# Patient Record
Sex: MALE | Race: WHITE | NOT HISPANIC OR LATINO | URBAN - NONMETROPOLITAN AREA
[De-identification: names, ages, dates, MRNs, and addresses within clinical notes are randomized per-mention and may not be internally consistent; named-entity substitution may affect disease eponyms.]

---

## 2022-07-13 RX ORDER — GABAPENTIN 300 MG/1
300 CAPSULE ORAL 2 TIMES DAILY
COMMUNITY
End: 2022-07-13 | Stop reason: SDUPTHER

## 2022-07-13 NOTE — TELEPHONE ENCOUNTER
Rx Refill Note  Requested Prescriptions     Pending Prescriptions Disp Refills   • metFORMIN (GLUCOPHAGE) 500 MG tablet       Sig: Take 1 tablet by mouth 3 (Three) Times a Day With Meals.   • gabapentin (NEURONTIN) 300 MG capsule       Sig: Take 1 capsule by mouth 2 (Two) Times a Day.      Last office visit with prescribing clinician: Visit date not found      Next office visit with prescribing clinician: 7/21/2022            Noemi Yañez Rep  07/13/22, 16:30 EDT

## 2022-07-14 NOTE — TELEPHONE ENCOUNTER
Caller: Gustavo Singh    Relationship to patient: Self    Best call back number: [phone]    Patient is needing: PATIENT CALLED BACK TO CHECK ON MEDICATION REFILLS.   PLEASE ADVISE

## 2022-07-14 NOTE — TELEPHONE ENCOUNTER
Rx Refill Note    PROTOCOLS NOT MET     Requested Prescriptions     Pending Prescriptions Disp Refills   • metFORMIN (GLUCOPHAGE) 500 MG tablet 270 tablet 0     Sig: Take 1 tablet by mouth 3 (Three) Times a Day With Meals.   • gabapentin (NEURONTIN) 300 MG capsule 180 capsule 0     Sig: Take 1 capsule by mouth 2 (Two) Times a Day.      Last office visit with prescribing clinician: Visit date not found      Next office visit with prescribing clinician: 7/21/2022            Leah Encinas LPN  07/14/22, 12:08 EDT

## 2022-07-15 RX ORDER — GABAPENTIN 300 MG/1
300 CAPSULE ORAL 2 TIMES DAILY
Qty: 180 CAPSULE | Refills: 0 | Status: SHIPPED | OUTPATIENT
Start: 2022-07-15 | End: 2022-10-18

## 2022-07-21 ENCOUNTER — OFFICE VISIT (OUTPATIENT)
Dept: FAMILY MEDICINE CLINIC | Facility: CLINIC | Age: 57
End: 2022-07-21

## 2022-07-21 VITALS
OXYGEN SATURATION: 96 % | HEIGHT: 73 IN | TEMPERATURE: 98.9 F | SYSTOLIC BLOOD PRESSURE: 136 MMHG | RESPIRATION RATE: 16 BRPM | BODY MASS INDEX: 27.67 KG/M2 | DIASTOLIC BLOOD PRESSURE: 82 MMHG | HEART RATE: 66 BPM | WEIGHT: 208.8 LBS

## 2022-07-21 DIAGNOSIS — E78.2 MIXED HYPERLIPIDEMIA: ICD-10-CM

## 2022-07-21 DIAGNOSIS — E11.9 TYPE 2 DIABETES MELLITUS WITHOUT COMPLICATION, WITHOUT LONG-TERM CURRENT USE OF INSULIN: ICD-10-CM

## 2022-07-21 DIAGNOSIS — I10 BENIGN HYPERTENSION: Primary | ICD-10-CM

## 2022-07-21 PROBLEM — G47.33 OSA (OBSTRUCTIVE SLEEP APNEA): Status: ACTIVE | Noted: 2022-07-21

## 2022-07-21 PROBLEM — Z97.2 WEARS DENTURES: Status: ACTIVE | Noted: 2022-07-21

## 2022-07-21 PROBLEM — Z97.3 WEARS GLASSES: Status: ACTIVE | Noted: 2022-07-21

## 2022-07-21 PROBLEM — T78.40XA ALLERGIES: Status: ACTIVE | Noted: 2022-07-21

## 2022-07-21 PROBLEM — Z72.0 TOBACCO CHEW USE: Status: ACTIVE | Noted: 2022-07-21

## 2022-07-21 PROBLEM — I25.10 CHD (CORONARY HEART DISEASE): Status: ACTIVE | Noted: 2018-01-01

## 2022-07-21 PROCEDURE — 99214 OFFICE O/P EST MOD 30 MIN: CPT | Performed by: FAMILY MEDICINE

## 2022-07-21 RX ORDER — BETAMETHASONE DIPROPIONATE 0.05 %
1 OINTMENT (GRAM) TOPICAL 2 TIMES DAILY
COMMUNITY

## 2022-07-21 RX ORDER — HYDROCHLOROTHIAZIDE 25 MG/1
25 TABLET ORAL DAILY
COMMUNITY
End: 2022-08-02

## 2022-07-21 RX ORDER — ROSUVASTATIN CALCIUM 40 MG/1
40 TABLET, COATED ORAL DAILY
Qty: 90 TABLET | Refills: 0 | Status: SHIPPED | OUTPATIENT
Start: 2022-07-21 | End: 2022-11-16

## 2022-07-21 RX ORDER — NIACIN 500 MG/1
500 TABLET, EXTENDED RELEASE ORAL DAILY
COMMUNITY

## 2022-07-21 RX ORDER — AMLODIPINE BESYLATE 10 MG/1
10 TABLET ORAL DAILY
COMMUNITY
Start: 2022-05-02 | End: 2022-08-16

## 2022-07-21 RX ORDER — ROSUVASTATIN CALCIUM 40 MG/1
40 TABLET, COATED ORAL DAILY
COMMUNITY
Start: 2022-05-18 | End: 2022-07-21 | Stop reason: SDUPTHER

## 2022-07-21 RX ORDER — MONTELUKAST SODIUM 10 MG/1
TABLET ORAL AS NEEDED
COMMUNITY
Start: 2022-05-12

## 2022-07-21 RX ORDER — FEXOFENADINE HCL 180 MG/1
180 TABLET ORAL AS NEEDED
COMMUNITY

## 2022-07-21 RX ORDER — CHLORAL HYDRATE 500 MG
CAPSULE ORAL DAILY
COMMUNITY
Start: 2022-02-14

## 2022-07-21 RX ORDER — POTASSIUM CHLORIDE 1500 MG/1
20 TABLET, EXTENDED RELEASE ORAL DAILY
COMMUNITY
Start: 2022-05-12 | End: 2022-08-16

## 2022-07-21 RX ORDER — ALOGLIPTIN 6.25 MG/1
1 TABLET, FILM COATED ORAL DAILY
COMMUNITY
Start: 2022-05-02 | End: 2022-08-16

## 2022-07-21 RX ORDER — OLMESARTAN MEDOXOMIL 40 MG/1
40 TABLET ORAL DAILY
COMMUNITY

## 2022-07-21 NOTE — PROGRESS NOTES
NAME@ presents to [facility]      Chief Complaint    Diabetes    HPI     Memory Loss:  Altered Mentation: No.    Tremors:  No.    Eye Exam Date:  ***    Blurred Vision:  No.    Frequent Urination:   No.    Gastroenterology: Nausea: No. Increased Appetite: No.     Increased Thirst: No.          Current Outpatient Medications:   •  amLODIPine (NORVASC) 10 MG tablet, Take 10 mg by mouth Daily., Disp: , Rfl:   •  ASPIRIN 81 PO, Take 81 mg by mouth Daily., Disp: , Rfl:   •  betamethasone dipropionate (DIPROLENE) 0.05 % ointment, Apply 1 application topically to the appropriate area as directed 2 (Two) Times a Day., Disp: , Rfl:   •  Cholecalciferol (VITAMIN D3 PO), Take  by mouth Daily., Disp: , Rfl:   •  fexofenadine (ALLEGRA) 180 MG tablet, Take 180 mg by mouth Daily., Disp: , Rfl:   •  gabapentin (NEURONTIN) 300 MG capsule, Take 1 capsule by mouth 2 (Two) Times a Day., Disp: 180 capsule, Rfl: 0  •  hydroCHLOROthiazide (HYDRODIURIL) 25 MG tablet, Take 25 mg by mouth Daily., Disp: , Rfl:   •  KLOR-CON 20 MEQ CR tablet, Take 20 mEq by mouth Daily. Take with food., Disp: , Rfl:   •  metFORMIN (GLUCOPHAGE) 500 MG tablet, Take 1 tablet by mouth 3 (Three) Times a Day With Meals., Disp: 270 tablet, Rfl: 0  •  montelukast (SINGULAIR) 10 MG tablet, As Needed., Disp: , Rfl:   •  niacin (NIASPAN) 500 MG CR tablet, Take 500 mg by mouth Daily., Disp: , Rfl:   •  olmesartan (Benicar) 40 MG tablet, Take 40 mg by mouth Daily., Disp: , Rfl:   •  Omega-3 Fatty Acids (fish oil) 1000 MG capsule capsule, Daily., Disp: , Rfl:   •  rosuvastatin (CRESTOR) 40 MG tablet, Take 40 mg by mouth Daily., Disp: , Rfl:   •  SITagliptin (JANUVIA) 25 MG tablet, Take 25 mg by mouth Daily., Disp: , Rfl:   •  Alogliptin Benzoate 6.25 MG tablet, Take 1 tablet by mouth Daily., Disp: , Rfl:      Allergies   Allergen Reactions   • Eggs Or Egg-Derived Products Other (See Comments)     Positive allergy test   • Lisinopril Other  "(See Comments)     drowsy   • Penicillins Hives   • Tenoretic [Atenolol-Chlorthalidone] Other (See Comments)     ED        Past Medical History:   Diagnosis Date   • Allergies     eggs, corn, green beans- immunotherapy in childhood   • CHD (coronary heart disease) 2018   • DM II (diabetes mellitus, type II), controlled (HCC)    • Hyperlipidemia 1999   • Hypertension 2002   • Tobacco chew use     quit in 1985   • Wears dentures     upper   • Wears glasses        Past Surgical History:   Procedure Laterality Date   • CARDIAC CATHETERIZATION  10/2018    CHD, 1 stent placement   • CIRCUMCISION  08/1964   • COLONOSCOPY  1985   • HEEL SPUR SURGERY Left 2002   • TONSILLECTOMY AND ADENOIDECTOMY  1981   • WISDOM TOOTH EXTRACTION          Family History   Problem Relation Age of Onset   • Hypertension Mother    • Hyperlipidemia Mother    • Liver cancer Mother    • Depression Father    • Alcohol abuse Father    • Hypertension Sister    • Diabetes Sister    • Hypertension Sister    • Diabetes Sister    • Liver cancer Sister    • No Known Problems Daughter    • Heart attack Son    • No Known Problems Maternal Grandmother    • Stroke Maternal Grandfather    • Stroke Paternal Grandmother    • No Known Problems Paternal Grandfather         Social History     Socioeconomic History   • Marital status:    Tobacco Use   • Smoking status: Never Smoker   • Smokeless tobacco: Former User     Types: Chew     Quit date: 1990   Vaping Use   • Vaping Use: Never used   Substance and Sexual Activity   • Alcohol use: Never   • Drug use: Not Currently        ROS: As in HPI      Objective   Vital Signs:  /82 (BP Location: Right arm, Patient Position: Sitting, Cuff Size: Adult)   Pulse 66   Temp 98.9 °F (37.2 °C) (Temporal)   Resp 16   Ht 185.4 cm (73\")   Wt 94.7 kg (208 lb 12.8 oz)   SpO2 96%   BMI 27.55 kg/m²   Estimated body mass index is 27.55 kg/m² as calculated from the following:    Height as of this encounter: 185.4 cm " "(73\").    Weight as of this encounter: 94.7 kg (208 lb 12.8 oz).    Home BG Readings:   Date:   Fasting:   Bedtime:    Physical Exam  Constitutional:       Appearance: Normal appearance.      Comments: Appearance N.A.D., pleasant, excellent energy, well-kept.  Home BG Readings:   Date:   Fasting:   Bedtime:                Cardiovascular:      Rate and Rhythm: Normal rate and regular rhythm.      Heart sounds: No murmur heard.  Pulmonary:      Comments: Clear to  auscultation bilaterally, excellent air movement throughout the lung fields  Abdominal:      Palpations: Abdomen is soft. There is no mass (palpable).      Tenderness: There is no abdominal tenderness. There is no guarding.      Comments: Bowel sounds: Normal pitch & frequency x 4 quadrants   Musculoskeletal:      Comments: Lower Extremities: No skin changes.  Toes are warm and pink.  Peripheral Pulses: Normal.   Neurological:      Comments: Vibratory Sense: Normal.  Propiocetion: Normal.  Pin Prick: Normal  Tremors: No         Result Review :{Labs  Result Review  Imaging  Med Tab  Media  Procedures  :23}  {The following data was reviewed by (Optional):12371}  {Ambulatory Labs (Optional):11259}  {Data reviewed (Optional):76325:::1}              Assessment and Plan {CC Problem List  Visit Diagnosis   ROS  Review (Popup)  Health Maintenance  Quality  BestPractice  Medications  SmartSets  SnapShot Encounters  Media :23}  There are no diagnoses linked to this encounter.         Follow Up {Instructions Charge Capture  Follow-up Communications :23}  No follow-ups on file.    Patient was given instructions and counseling regarding his condition or for health maintenance advice. Please see specific information pulled into the AVS if appropriate.   There are no Patient Instructions on file for this visit.       "

## 2022-07-21 NOTE — PROGRESS NOTES
Gustavo Singh presents to [facility] PRIMARY [facility]      Chief Complaint  Cor    HPI     Respiratory:  SOA:  no. Exertional dyspnea: no.  Dyspnea at rest: no.  Flights of stairs climbable: 4  Cardiology:  Chest pain: no.  Dizziness: no. Easily fatigued: no.  Pedal edema: no.  Light-headiness: no.  Symptoms of claudication: no.  Orthopnea: no.  Palpitations: no. Tachycardia: no.  Ophthalmology:  Last eye exam date: 12/2021 . Vision changes: none.  Gastroenterology:  Heartburn: no.  Nausea: no.      Current Outpatient Medications:   •  amLODIPine (NORVASC) 10 MG tablet, Take 10 mg by mouth Daily., Disp: , Rfl:   •  ASPIRIN 81 PO, Take 81 mg by mouth Daily., Disp: , Rfl:   •  betamethasone dipropionate (DIPROLENE) 0.05 % ointment, Apply 1 application topically to the appropriate area as directed 2 (Two) Times a Day., Disp: , Rfl:   •  Cholecalciferol (VITAMIN D3 PO), Take  by mouth Daily., Disp: , Rfl:   •  fexofenadine (ALLEGRA) 180 MG tablet, Take 180 mg by mouth Daily., Disp: , Rfl:   •  gabapentin (NEURONTIN) 300 MG capsule, Take 1 capsule by mouth 2 (Two) Times a Day., Disp: 180 capsule, Rfl: 0  •  hydroCHLOROthiazide (HYDRODIURIL) 25 MG tablet, Take 25 mg by mouth Daily., Disp: , Rfl:   •  KLOR-CON 20 MEQ CR tablet, Take 20 mEq by mouth Daily. Take with food., Disp: , Rfl:   •  metFORMIN (GLUCOPHAGE) 500 MG tablet, Take 1 tablet by mouth 3 (Three) Times a Day With Meals., Disp: 270 tablet, Rfl: 0  •  montelukast (SINGULAIR) 10 MG tablet, As Needed., Disp: , Rfl:   •  niacin (NIASPAN) 500 MG CR tablet, Take 500 mg by mouth Daily., Disp: , Rfl:   •  olmesartan (Benicar) 40 MG tablet, Take 40 mg by mouth Daily., Disp: , Rfl:   •  Omega-3 Fatty Acids (fish oil) 1000 MG capsule capsule, Daily., Disp: , Rfl:   •  rosuvastatin (CRESTOR) 40 MG tablet, Take 40 mg by mouth Daily., Disp: , Rfl:   •  SITagliptin (JANUVIA) 25 MG tablet, Take 25 mg by mouth Daily., Disp: , Rfl:   •  Alogliptin Benzoate 6.25 MG  tablet, Take 1 tablet by mouth Daily., Disp: , Rfl:      Allergies   Allergen Reactions   • Eggs Or Egg-Derived Products Other (See Comments)     Positive allergy test   • Lisinopril Other (See Comments)     drowsy   • Penicillins Hives   • Tenoretic [Atenolol-Chlorthalidone] Other (See Comments)     ED        Past Medical History:   Diagnosis Date   • Allergies     eggs, corn, green beans- immunotherapy in childhood   • CHD (coronary heart disease) 2018   • DM II (diabetes mellitus, type II), controlled ([facility])    • Hyperlipidemia 1999   • Hypertension 2002   • Tobacco chew use     quit in 1985   • Wears dentures     upper   • Wears glasses        Past Surgical History:   Procedure Laterality Date   • CARDIAC CATHETERIZATION  10/2018    CHD, 1 stent placement   • CIRCUMCISION  08/1964   • COLONOSCOPY  1985   • HEEL SPUR SURGERY Left 2002   • TONSILLECTOMY AND ADENOIDECTOMY  1981   • WISDOM TOOTH EXTRACTION          Family History   Problem Relation Age of Onset   • Hypertension Mother    • Hyperlipidemia Mother    • Liver cancer Mother    • Depression Father    • Alcohol abuse Father    • Hypertension Sister    • Diabetes Sister    • Hypertension Sister    • Diabetes Sister    • Liver cancer Sister    • No Known Problems Daughter    • Heart attack Son    • No Known Problems Maternal Grandmother    • Stroke Maternal Grandfather    • Stroke Paternal Grandmother    • No Known Problems Paternal Grandfather         Social History     Socioeconomic History   • Marital status:    Tobacco Use   • Smoking status: Never Smoker   • Smokeless tobacco: Former User     Types: Chew     Quit date: 1990   Vaping Use   • Vaping Use: Never used   Substance and Sexual Activity   • Alcohol use: Never   • Drug use: Not Currently        ROS: As in HPI      Objective   Vital Signs:  /82 (BP Location: Right arm, Patient Position: Sitting, Cuff Size: Adult)   Pulse 66   Temp 98.9 °F (37.2 °C) (Temporal)   Resp 16   Ht 185.4  "cm (73\")   Wt 94.7 kg (208 lb 12.8 oz)   SpO2 96%   BMI 27.55 kg/m²   Estimated body mass index is 27.55 kg/m² as calculated from the following:    Height as of this encounter: 185.4 cm (73\").    Weight as of this encounter: 94.7 kg (208 lb 12.8 oz).        Physical Exam  Constitutional:       Comments: No acute distress. Well kept. Pleasant, cheerful, relaxed, interactive, coherant   Neck:      Vascular: No JVD.   Cardiovascular:      Rate and Rhythm: Normal rate and regular rhythm.      Pulses:           Carotid pulses are 2+ on the right side and 2+ on the left side.       Radial pulses are 2+ on the right side and 2+ on the left side.        Femoral pulses are 0 on the right side and 0 on the left side.       Popliteal pulses are 2+ on the right side and 2+ on the left side.        Dorsalis pedis pulses are 2+ on the right side and 2+ on the left side.        Posterior tibial pulses are 2+ on the right side and 2+ on the left side.      Heart sounds: Normal heart sounds.      Comments: No lower extremity varicosities  Musculoskeletal:      Right lower leg: No edema.      Left lower leg: No edema.   Skin:     Comments: No dystrophic toenails. No integument changes of the shins or feet.  Toes are warm and pink         Result Review :                    Assessment and Plan   Diagnoses and all orders for this visit:    1. Benign hypertension (Primary)    2. Mixed hyperlipidemia    3. Type 2 diabetes mellitus without complication, without long-term current use of insulin (HCC)             Follow Up   Return in about 3 months (around 10/21/2022) for Diabetes.    Patient was given instructions and counseling regarding his condition or for health maintenance advice. Please see specific information pulled into the AVS if appropriate.   There are no Patient Instructions on file for this visit.     "

## 2022-08-02 RX ORDER — HYDROCHLOROTHIAZIDE 25 MG/1
TABLET ORAL
Qty: 90 TABLET | Refills: 0 | Status: SHIPPED | OUTPATIENT
Start: 2022-08-02 | End: 2023-01-31

## 2022-08-12 DIAGNOSIS — I10 ESSENTIAL (PRIMARY) HYPERTENSION: ICD-10-CM

## 2022-08-15 NOTE — TELEPHONE ENCOUNTER
Rx Refill Note    PROTOCOLS NOT MET     Requested Prescriptions     Pending Prescriptions Disp Refills   • Alogliptin Benzoate 6.25 MG tablet [Pharmacy Med Name: alogliptin 6.25 mg tablet] 90 tablet 0     Sig: TAKE ONE TABLET BY MOUTH ONCE DAILY      Last office visit with prescribing clinician: 7/21/2022      Next office visit with prescribing clinician: 10/20/2022            Ignacia Lynn MA  08/15/22, 09:48 EDT

## 2022-08-15 NOTE — TELEPHONE ENCOUNTER
Rx Refill Note    PROTOCOLS NOT MET     Requested Prescriptions     Pending Prescriptions Disp Refills   • amLODIPine (NORVASC) 10 MG tablet [Pharmacy Med Name: amlodipine 10 mg tablet] 90 tablet 0     Sig: TAKE ONE TABLET BY MOUTH ONCE DAILY   • KLOR-CON 20 MEQ CR tablet [Pharmacy Med Name: Klor-Con M20 mEq tablet,extended release] 90 tablet 0     Sig: TAKE ONE TABLET BY MOUTH ONCE DAILY WITH FOOD      Last office visit with prescribing clinician: 7/21/2022      Next office visit with prescribing clinician: 8/15/2022       {TIP  Please add Last Relevant Lab Date if appropriate:23}     Ignacia Lynn MA  08/15/22, 09:44 EDT

## 2022-08-16 RX ORDER — AMLODIPINE BESYLATE 10 MG/1
TABLET ORAL
Qty: 90 TABLET | Refills: 0 | Status: SHIPPED | OUTPATIENT
Start: 2022-08-16 | End: 2022-11-16

## 2022-08-16 RX ORDER — ALOGLIPTIN 6.25 MG/1
TABLET, FILM COATED ORAL
Qty: 90 TABLET | Refills: 0 | Status: SHIPPED | OUTPATIENT
Start: 2022-08-16 | End: 2022-10-25 | Stop reason: DRUGHIGH

## 2022-08-16 RX ORDER — POTASSIUM CHLORIDE 1500 MG/1
TABLET, EXTENDED RELEASE ORAL
Qty: 90 TABLET | Refills: 0 | Status: SHIPPED | OUTPATIENT
Start: 2022-08-16 | End: 2022-11-16

## 2022-10-18 RX ORDER — GABAPENTIN 300 MG/1
CAPSULE ORAL
Qty: 180 CAPSULE | Refills: 0 | Status: SHIPPED | OUTPATIENT
Start: 2022-10-18 | End: 2023-01-17

## 2022-10-19 RX ORDER — OMEGA-3-ACID ETHYL ESTERS 1 G/1
CAPSULE, LIQUID FILLED ORAL
COMMUNITY
Start: 2022-08-15 | End: 2022-11-16

## 2022-10-20 ENCOUNTER — OFFICE VISIT (OUTPATIENT)
Dept: FAMILY MEDICINE CLINIC | Age: 57
End: 2022-10-20

## 2022-10-20 VITALS
TEMPERATURE: 98.4 F | HEIGHT: 74 IN | WEIGHT: 211.2 LBS | SYSTOLIC BLOOD PRESSURE: 124 MMHG | BODY MASS INDEX: 27.11 KG/M2 | DIASTOLIC BLOOD PRESSURE: 88 MMHG | RESPIRATION RATE: 18 BRPM | HEART RATE: 63 BPM | OXYGEN SATURATION: 97 %

## 2022-10-20 DIAGNOSIS — I10 BENIGN HYPERTENSION: ICD-10-CM

## 2022-10-20 DIAGNOSIS — Z23 ENCOUNTER FOR IMMUNIZATION: ICD-10-CM

## 2022-10-20 DIAGNOSIS — E78.2 MIXED HYPERLIPIDEMIA: ICD-10-CM

## 2022-10-20 DIAGNOSIS — I25.10 ATHEROSCLEROSIS OF NATIVE CORONARY ARTERY OF NATIVE HEART WITHOUT ANGINA PECTORIS: ICD-10-CM

## 2022-10-20 DIAGNOSIS — E11.9 TYPE 2 DIABETES MELLITUS WITHOUT COMPLICATION, WITHOUT LONG-TERM CURRENT USE OF INSULIN: Primary | ICD-10-CM

## 2022-10-20 LAB — HBA1C MFR BLD: 7.8 % (ref 3.5–5.6)

## 2022-10-20 PROCEDURE — 82043 UR ALBUMIN QUANTITATIVE: CPT | Performed by: FAMILY MEDICINE

## 2022-10-20 PROCEDURE — 90686 IIV4 VACC NO PRSV 0.5 ML IM: CPT | Performed by: FAMILY MEDICINE

## 2022-10-20 PROCEDURE — 90471 IMMUNIZATION ADMIN: CPT | Performed by: FAMILY MEDICINE

## 2022-10-20 PROCEDURE — 80053 COMPREHEN METABOLIC PANEL: CPT | Performed by: FAMILY MEDICINE

## 2022-10-20 PROCEDURE — 80061 LIPID PANEL: CPT | Performed by: FAMILY MEDICINE

## 2022-10-20 PROCEDURE — 83036 HEMOGLOBIN GLYCOSYLATED A1C: CPT | Performed by: FAMILY MEDICINE

## 2022-10-20 PROCEDURE — 99214 OFFICE O/P EST MOD 30 MIN: CPT | Performed by: FAMILY MEDICINE

## 2022-10-20 NOTE — PROGRESS NOTES
Venipuncture Blood Specimen Collection  Venipuncture performed in left arm by Ignacia Lynn MA with good hemostasis. Patient tolerated the procedure well without complications.   10/20/22   Ignacia Lynn MA

## 2022-10-21 LAB
ALBUMIN SERPL-MCNC: 4.9 G/DL (ref 3.5–5.2)
ALBUMIN UR-MCNC: <1.2 MG/DL
ALBUMIN/GLOB SERPL: 2 G/DL
ALP SERPL-CCNC: 67 U/L (ref 39–117)
ALT SERPL W P-5'-P-CCNC: 50 U/L (ref 1–41)
ANION GAP SERPL CALCULATED.3IONS-SCNC: 13.4 MMOL/L (ref 5–15)
AST SERPL-CCNC: 55 U/L (ref 1–40)
BILIRUB SERPL-MCNC: 0.6 MG/DL (ref 0–1.2)
BUN SERPL-MCNC: 12 MG/DL (ref 6–20)
BUN/CREAT SERPL: 16.9 (ref 7–25)
CALCIUM SPEC-SCNC: 10.1 MG/DL (ref 8.6–10.5)
CHLORIDE SERPL-SCNC: 97 MMOL/L (ref 98–107)
CHOLEST SERPL-MCNC: 111 MG/DL (ref 0–200)
CO2 SERPL-SCNC: 27.6 MMOL/L (ref 22–29)
CREAT SERPL-MCNC: 0.71 MG/DL (ref 0.76–1.27)
EGFRCR SERPLBLD CKD-EPI 2021: 106.3 ML/MIN/1.73
GLOBULIN UR ELPH-MCNC: 2.4 GM/DL
GLUCOSE SERPL-MCNC: 219 MG/DL (ref 65–99)
HDLC SERPL-MCNC: 42 MG/DL (ref 40–60)
LDLC SERPL CALC-MCNC: 42 MG/DL (ref 0–100)
LDLC/HDLC SERPL: 0.88 {RATIO}
POTASSIUM SERPL-SCNC: 3.7 MMOL/L (ref 3.5–5.2)
PROT SERPL-MCNC: 7.3 G/DL (ref 6–8.5)
SODIUM SERPL-SCNC: 138 MMOL/L (ref 136–145)
TRIGL SERPL-MCNC: 160 MG/DL (ref 0–150)
VLDLC SERPL-MCNC: 27 MG/DL (ref 5–40)

## 2022-10-25 RX ORDER — ALOGLIPTIN 12.5 MG/1
1 TABLET, FILM COATED ORAL DAILY
Qty: 90 TABLET | Refills: 0 | Status: SHIPPED | OUTPATIENT
Start: 2022-10-25 | End: 2023-01-18 | Stop reason: DRUGHIGH

## 2022-11-16 DIAGNOSIS — I10 ESSENTIAL (PRIMARY) HYPERTENSION: ICD-10-CM

## 2022-11-16 DIAGNOSIS — E78.2 MIXED HYPERLIPIDEMIA: ICD-10-CM

## 2022-11-16 RX ORDER — ROSUVASTATIN CALCIUM 40 MG/1
TABLET, COATED ORAL
Qty: 90 TABLET | Refills: 0 | Status: SHIPPED | OUTPATIENT
Start: 2022-11-16 | End: 2023-02-20

## 2022-11-16 RX ORDER — AMLODIPINE BESYLATE 10 MG/1
TABLET ORAL
Qty: 90 TABLET | Refills: 0 | Status: SHIPPED | OUTPATIENT
Start: 2022-11-16 | End: 2023-02-20

## 2022-11-16 RX ORDER — POTASSIUM CHLORIDE 1500 MG/1
TABLET, EXTENDED RELEASE ORAL
Qty: 90 TABLET | Refills: 0 | Status: SHIPPED | OUTPATIENT
Start: 2022-11-16 | End: 2023-01-19 | Stop reason: DRUGHIGH

## 2022-11-16 RX ORDER — OMEGA-3-ACID ETHYL ESTERS 1 G/1
CAPSULE, LIQUID FILLED ORAL
Qty: 180 CAPSULE | Refills: 0 | Status: SHIPPED | OUTPATIENT
Start: 2022-11-16 | End: 2023-02-20

## 2022-11-16 NOTE — TELEPHONE ENCOUNTER
Rx Refill Note  Requested Prescriptions     Pending Prescriptions Disp Refills   • KLOR-CON 20 MEQ CR tablet [Pharmacy Med Name: Klor-Con M20 mEq tablet,extended release] 90 tablet 0     Sig: TAKE ONE TABLET BY MOUTH ONCE DAILY WITH FOOD   • amLODIPine (NORVASC) 10 MG tablet [Pharmacy Med Name: amlodipine 10 mg tablet] 90 tablet 0     Sig: TAKE ONE TABLET BY MOUTH ONCE DAILY   • rosuvastatin (CRESTOR) 40 MG tablet [Pharmacy Med Name: rosuvastatin 40 mg tablet] 90 tablet 0     Sig: TAKE ONE TABLET BY MOUTH ONCE DAILY   • omega-3 acid ethyl esters (LOVAZA) 1 g capsule [Pharmacy Med Name: omega-3 acid ethyl esters 1 gram capsule] 180 capsule 0     Sig: TAKE TWO CAPSULES BY MOUTH WITH SUPPER      Last office visit with prescribing clinician: 10/20/2022      Next office visit with prescribing clinician: 1/18/2023            Ignacia Lynn MA  11/16/22, 08:29 EST

## 2023-01-17 RX ORDER — GABAPENTIN 300 MG/1
CAPSULE ORAL
Qty: 180 CAPSULE | Refills: 0 | Status: SHIPPED | OUTPATIENT
Start: 2023-01-17 | End: 2023-01-18 | Stop reason: DRUGHIGH

## 2023-01-18 ENCOUNTER — OFFICE VISIT (OUTPATIENT)
Dept: FAMILY MEDICINE CLINIC | Age: 58
End: 2023-01-18
Payer: COMMERCIAL

## 2023-01-18 VITALS
SYSTOLIC BLOOD PRESSURE: 132 MMHG | TEMPERATURE: 98.2 F | WEIGHT: 208.6 LBS | HEIGHT: 73 IN | BODY MASS INDEX: 27.65 KG/M2 | DIASTOLIC BLOOD PRESSURE: 78 MMHG | RESPIRATION RATE: 16 BRPM | HEART RATE: 71 BPM | OXYGEN SATURATION: 96 %

## 2023-01-18 DIAGNOSIS — E87.6 HYPOKALEMIA: ICD-10-CM

## 2023-01-18 DIAGNOSIS — I10 BENIGN HYPERTENSION: Primary | ICD-10-CM

## 2023-01-18 DIAGNOSIS — E08.42 DIABETIC POLYNEUROPATHY ASSOCIATED WITH DIABETES MELLITUS DUE TO UNDERLYING CONDITION: ICD-10-CM

## 2023-01-18 DIAGNOSIS — E11.9 TYPE 2 DIABETES MELLITUS WITHOUT COMPLICATION, WITHOUT LONG-TERM CURRENT USE OF INSULIN: ICD-10-CM

## 2023-01-18 DIAGNOSIS — E78.2 MIXED HYPERLIPIDEMIA: ICD-10-CM

## 2023-01-18 DIAGNOSIS — R74.8 ABNORMAL LIVER ENZYMES: ICD-10-CM

## 2023-01-18 LAB — HBA1C MFR BLD: 8.1 % (ref 3.5–5.6)

## 2023-01-18 PROCEDURE — 99214 OFFICE O/P EST MOD 30 MIN: CPT | Performed by: FAMILY MEDICINE

## 2023-01-18 PROCEDURE — 83036 HEMOGLOBIN GLYCOSYLATED A1C: CPT | Performed by: FAMILY MEDICINE

## 2023-01-18 PROCEDURE — 80053 COMPREHEN METABOLIC PANEL: CPT | Performed by: FAMILY MEDICINE

## 2023-01-18 RX ORDER — GABAPENTIN 400 MG/1
400 CAPSULE ORAL 2 TIMES DAILY
Qty: 60 CAPSULE | Refills: 2 | Status: SHIPPED | OUTPATIENT
Start: 2023-01-18

## 2023-01-18 RX ORDER — ALOGLIPTIN 25 MG/1
25 TABLET, FILM COATED ORAL DAILY
Qty: 90 TABLET | Refills: 0 | Status: SHIPPED | OUTPATIENT
Start: 2023-01-18

## 2023-01-18 NOTE — ASSESSMENT & PLAN NOTE
MDM: neuropathic pain in feet is not controlled.  Increased Gabapentin dose.  Patient has not had any side effects from the medication

## 2023-01-18 NOTE — PROGRESS NOTES
Gustavo Singh presents to [facility]      Chief Complaint  Hypertension, Naif LFTs    HPI     Respiratory:  SOA:  no. Exertional dyspnea: no.  Dyspnea at rest: no.  Flights of stairs climbable: 3  Cardiology:  Chest pain: no.  Dizziness: no. Easily fatigued: no.  Pedal edema: no.  Light-headiness: no.  Symptoms of claudication: no.  Orthopnea: no.  Palpitations: no. Tachycardia: no.  Ophthalmology:  Last eye exam date: 2021 . Vision changes: none.  Gastroenterology:  Heartburn: no.  Nausea: no.    ROS: blood glucose higher since insurance change his RX to Alogliptin.  Neuropathy of feet is not controlled.    Current Outpatient Medications:   •  amLODIPine (NORVASC) 10 MG tablet, TAKE ONE TABLET BY MOUTH ONCE DAILY, Disp: 90 tablet, Rfl: 0  •  ASPIRIN 81 PO, Take 81 mg by mouth Daily., Disp: , Rfl:   •  betamethasone dipropionate (DIPROLENE) 0.05 % ointment, Apply 1 application topically to the appropriate area as directed 2 (Two) Times a Day., Disp: , Rfl:   •  Cholecalciferol (VITAMIN D3 PO), Take  by mouth Daily., Disp: , Rfl:   •  fexofenadine (ALLEGRA) 180 MG tablet, Take 1 tablet by mouth As Needed., Disp: , Rfl:   •  hydroCHLOROthiazide (HYDRODIURIL) 25 MG tablet, TAKE ONE TABLET BY MOUTH EVERY MORNING, Disp: 90 tablet, Rfl: 0  •  KLOR-CON 20 MEQ CR tablet, TAKE ONE TABLET BY MOUTH ONCE DAILY WITH FOOD, Disp: 90 tablet, Rfl: 0  •  metFORMIN (GLUCOPHAGE) 500 MG tablet, TAKE ONE TABLET BY MOUTH THREE TIMES DAILY WITH MEALS, Disp: 270 tablet, Rfl: 0  •  montelukast (SINGULAIR) 10 MG tablet, As Needed., Disp: , Rfl:   •  niacin (NIASPAN) 500 MG CR tablet, Take 500 mg by mouth Daily., Disp: , Rfl:   •  olmesartan (BENICAR) 40 MG tablet, Take 40 mg by mouth Daily., Disp: , Rfl:   •  omega-3 acid ethyl esters (LOVAZA) 1 g capsule, TAKE TWO CAPSULES BY MOUTH WITH SUPPER, Disp: 180 capsule, Rfl: 0  •  Omega-3 Fatty Acids (fish oil) 1000 MG capsule capsule, Daily., Disp: , Rfl:   •   rosuvastatin (CRESTOR) 40 MG tablet, TAKE ONE TABLET BY MOUTH ONCE DAILY, Disp: 90 tablet, Rfl: 0  •  Alogliptin Benzoate 25 MG tablet, Take 1 tablet by mouth Daily., Disp: 90 tablet, Rfl: 0  •  gabapentin (NEURONTIN) 400 MG capsule, Take 1 capsule by mouth 2 (Two) Times a Day., Disp: 60 capsule, Rfl: 2     Allergies   Allergen Reactions   • Tenoretic [Atenolol-Chlorthalidone] Other (See Comments)     ED   • Eggs Or Egg-Derived Products Other (See Comments)     Positive allergy test   • Lisinopril Other (See Comments)     drowsy   • Penicillins Hives        Past Medical History:   Diagnosis Date   • Allergies     eggs, corn, green beans- immunotherapy in childhood   • CHASITY (obstructive sleep apnea)    • Tobacco chew use     quit in 1985   • Wears dentures     upper   • Wears glasses        Past Surgical History:   Procedure Laterality Date   • CARDIAC CATHETERIZATION  10/2018    CHD, 1 stent placement   • CIRCUMCISION  08/1964   • COLONOSCOPY  1985   • HEEL SPUR SURGERY Left 2002   • TONSILLECTOMY AND ADENOIDECTOMY  1981   • WISDOM TOOTH EXTRACTION          Family History   Problem Relation Age of Onset   • Hypertension Mother    • Hyperlipidemia Mother    • Liver cancer Mother    • Depression Father    • Alcohol abuse Father    • Hypertension Sister    • Diabetes Sister    • Hypertension Sister    • Diabetes Sister    • Liver cancer Sister    • No Known Problems Daughter    • Heart attack Son    • No Known Problems Maternal Grandmother    • Stroke Maternal Grandfather    • Stroke Paternal Grandmother    • No Known Problems Paternal Grandfather         Social History     Socioeconomic History   • Marital status:    Tobacco Use   • Smoking status: Never   • Smokeless tobacco: Former     Types: Chew     Quit date: 1990   Vaping Use   • Vaping Use: Never used   Substance and Sexual Activity   • Alcohol use: Not Currently   • Drug use: Not Currently   • Sexual activity: Defer              Objective   Vital  "Signs:  /78 (BP Location: Left arm, Patient Position: Sitting, Cuff Size: Adult)   Pulse 71   Temp 98.2 °F (36.8 °C) (Temporal)   Resp 16   Ht 185.4 cm (73\")   Wt 94.6 kg (208 lb 9.6 oz)   SpO2 96%   BMI 27.52 kg/m²   Estimated body mass index is 27.52 kg/m² as calculated from the following:    Height as of this encounter: 185.4 cm (73\").    Weight as of this encounter: 94.6 kg (208 lb 9.6 oz).        Physical Exam  General:  No acute distress, cheerful, well kept, interactive, good energy level.  Cardiac:  Heart regular rate and rhythm without murmur.  Respiratory:  Lungs clear to auscultation bilaterally and excellent air movement throughout.  Pulses:                            Right          Left         Bruit  Carotid                2+              2+           No  Radial                 2+              2+  Abd Ao               Not bounding  Femoral             Not palpable bilaterally  DP                      2+              2+  PT                      2+              2+  Venous:  Absent JVD. Absent lower extremity varicosities.  Dermatology:  Non-dystrophic toe nails. No Integument changes of the toes, feet, ankles, shins.  Lower Extremities:  No leg edema.  Toes are warm and pink    Blood glucose readings date:10/4-1/17.  Fasting 131-203 ( >160 since 10/27).  Random 124-218 (301)       Result Review :                    Assessment and Plan   Diagnoses and all orders for this visit:    1. Benign hypertension (Primary)  Assessment & Plan:  MDM: Stable. Continue present medications without changes.        2. Abnormal liver enzymes  Comments:  MDM: Naif LFTs  Orders:  -     Comprehensive metabolic panel    3. Type 2 diabetes mellitus without complication, without long-term current use of insulin (HCC)  Assessment & Plan:  MDM: Diabetes is not controlled.  Increase DPP-4 inhibitor    Orders:  -     Hemoglobin A1c  -     Alogliptin Benzoate 25 MG tablet; Take 1 tablet by mouth Daily.  Dispense: 90 " tablet; Refill: 0    4. Mixed hyperlipidemia  Assessment & Plan:  MDM: Stable. Continue present medications without changes.      5. Diabetic polyneuropathy associated with diabetes mellitus due to underlying condition (HCC)  Assessment & Plan:  MDM: neuropathic pain in feet is not controlled.  Increased Gabapentin dose.  Patient has not had any side effects from the medication    Orders:  -     gabapentin (NEURONTIN) 400 MG capsule; Take 1 capsule by mouth 2 (Two) Times a Day.  Dispense: 60 capsule; Refill: 2           Follow Up   Return in about 3 months (around 4/18/2023) for Diabetes.    Patient was given instructions and counseling regarding his condition or for health maintenance advice. Please see specific information pulled into the AVS if appropriate.     Mirtha Marcos MD

## 2023-01-19 LAB
ALBUMIN SERPL-MCNC: 4.5 G/DL (ref 3.5–5.2)
ALBUMIN/GLOB SERPL: 1.7 G/DL
ALP SERPL-CCNC: 67 U/L (ref 39–117)
ALT SERPL W P-5'-P-CCNC: 47 U/L (ref 1–41)
ANION GAP SERPL CALCULATED.3IONS-SCNC: 11.9 MMOL/L (ref 5–15)
AST SERPL-CCNC: 60 U/L (ref 1–40)
BILIRUB SERPL-MCNC: 0.8 MG/DL (ref 0–1.2)
BUN SERPL-MCNC: 12 MG/DL (ref 6–20)
BUN/CREAT SERPL: 16.7 (ref 7–25)
CALCIUM SPEC-SCNC: 9.5 MG/DL (ref 8.6–10.5)
CHLORIDE SERPL-SCNC: 99 MMOL/L (ref 98–107)
CO2 SERPL-SCNC: 26.1 MMOL/L (ref 22–29)
CREAT SERPL-MCNC: 0.72 MG/DL (ref 0.76–1.27)
EGFRCR SERPLBLD CKD-EPI 2021: 105.9 ML/MIN/1.73
GLOBULIN UR ELPH-MCNC: 2.7 GM/DL
GLUCOSE SERPL-MCNC: 238 MG/DL (ref 65–99)
POTASSIUM SERPL-SCNC: 3.3 MMOL/L (ref 3.5–5.2)
PROT SERPL-MCNC: 7.2 G/DL (ref 6–8.5)
SODIUM SERPL-SCNC: 137 MMOL/L (ref 136–145)

## 2023-01-19 RX ORDER — POTASSIUM CHLORIDE 20 MEQ/1
20 TABLET, EXTENDED RELEASE ORAL 2 TIMES DAILY
Qty: 60 TABLET | Refills: 0 | Status: SHIPPED | OUTPATIENT
Start: 2023-01-19 | End: 2023-03-07

## 2023-01-27 PROCEDURE — 80048 BASIC METABOLIC PNL TOTAL CA: CPT | Performed by: FAMILY MEDICINE

## 2023-01-30 NOTE — TELEPHONE ENCOUNTER
Rx Refill Note    PROTOCOLS NOT MET     Requested Prescriptions     Pending Prescriptions Disp Refills   • hydroCHLOROthiazide (HYDRODIURIL) 25 MG tablet [Pharmacy Med Name: hydrochlorothiazide 25 mg tablet] 90 tablet 0     Sig: TAKE ONE TABLET BY MOUTH EVERY MORNING      Last office visit with prescribing clinician: 1/18/2023      Next office visit with prescribing clinician: Visit date not found            Ignacia Lynn MA  01/30/23, 15:30 EST

## 2023-01-31 RX ORDER — HYDROCHLOROTHIAZIDE 25 MG/1
TABLET ORAL
Qty: 90 TABLET | Refills: 0 | Status: SHIPPED | OUTPATIENT
Start: 2023-01-31

## 2023-02-13 ENCOUNTER — CLINICAL SUPPORT (OUTPATIENT)
Dept: FAMILY MEDICINE CLINIC | Age: 58
End: 2023-02-13
Payer: COMMERCIAL

## 2023-02-13 DIAGNOSIS — E87.6 HYPOKALEMIA: ICD-10-CM

## 2023-02-13 PROCEDURE — 36415 COLL VENOUS BLD VENIPUNCTURE: CPT | Performed by: FAMILY MEDICINE

## 2023-02-13 PROCEDURE — 80048 BASIC METABOLIC PNL TOTAL CA: CPT | Performed by: FAMILY MEDICINE

## 2023-02-13 NOTE — PROGRESS NOTES
Venipuncture Blood Specimen Collection  Venipuncture performed in left arm by Jer Santos MA with good hemostasis. Patient tolerated the procedure well without complications.   02/13/23   Jer Santos MA

## 2023-02-14 LAB
ANION GAP SERPL CALCULATED.3IONS-SCNC: 14.8 MMOL/L (ref 5–15)
BUN SERPL-MCNC: 11 MG/DL (ref 6–20)
BUN/CREAT SERPL: 14.1 (ref 7–25)
CALCIUM SPEC-SCNC: 9.6 MG/DL (ref 8.6–10.5)
CHLORIDE SERPL-SCNC: 96 MMOL/L (ref 98–107)
CO2 SERPL-SCNC: 25.2 MMOL/L (ref 22–29)
CREAT SERPL-MCNC: 0.78 MG/DL (ref 0.76–1.27)
EGFRCR SERPLBLD CKD-EPI 2021: 103.4 ML/MIN/1.73
GLUCOSE SERPL-MCNC: 335 MG/DL (ref 65–99)
POTASSIUM SERPL-SCNC: 3.5 MMOL/L (ref 3.5–5.2)
SODIUM SERPL-SCNC: 136 MMOL/L (ref 136–145)

## 2023-02-20 DIAGNOSIS — I10 ESSENTIAL (PRIMARY) HYPERTENSION: ICD-10-CM

## 2023-02-20 DIAGNOSIS — E78.2 MIXED HYPERLIPIDEMIA: ICD-10-CM

## 2023-02-20 RX ORDER — OMEGA-3-ACID ETHYL ESTERS 1 G/1
CAPSULE, LIQUID FILLED ORAL
Qty: 180 CAPSULE | Refills: 0 | Status: SHIPPED | OUTPATIENT
Start: 2023-02-20

## 2023-02-20 RX ORDER — AMLODIPINE BESYLATE 10 MG/1
TABLET ORAL
Qty: 90 TABLET | Refills: 0 | Status: SHIPPED | OUTPATIENT
Start: 2023-02-20

## 2023-02-20 RX ORDER — ROSUVASTATIN CALCIUM 40 MG/1
TABLET, COATED ORAL
Qty: 90 TABLET | Refills: 0 | Status: SHIPPED | OUTPATIENT
Start: 2023-02-20

## 2023-03-06 DIAGNOSIS — E87.6 HYPOKALEMIA: ICD-10-CM

## 2023-03-06 NOTE — TELEPHONE ENCOUNTER
Rx Refill Note  Requested Prescriptions     Pending Prescriptions Disp Refills   • potassium chloride (K-DUR,KLOR-CON) 20 MEQ CR tablet [Pharmacy Med Name: potassium chloride ER 20 mEq tablet,extended release(part/cryst)] 60 tablet 0     Sig: TAKE ONE TABLET BY MOUTH TWICE DAILY      Last office visit with prescribing clinician: 1/18/2023   Last telemedicine visit with prescribing clinician: Visit date not found   Next office visit with prescribing clinician: Visit date not found                         Would you like a call back once the refill request has been completed: [] Yes [] No    If the office needs to give you a call back, can they leave a voicemail: [] Yes [] No    Ignacia Lynn MA  03/06/23, 09:43 EST

## 2023-03-07 RX ORDER — POTASSIUM CHLORIDE 20 MEQ/1
TABLET, EXTENDED RELEASE ORAL
Qty: 60 TABLET | Refills: 0 | Status: SHIPPED | OUTPATIENT
Start: 2023-03-07

## 2023-04-12 ENCOUNTER — OFFICE VISIT (OUTPATIENT)
Dept: FAMILY MEDICINE CLINIC | Age: 58
End: 2023-04-12
Payer: COMMERCIAL

## 2023-04-12 ENCOUNTER — TELEPHONE (OUTPATIENT)
Dept: FAMILY MEDICINE CLINIC | Age: 58
End: 2023-04-12

## 2023-04-12 VITALS
BODY MASS INDEX: 27.11 KG/M2 | HEIGHT: 74 IN | HEART RATE: 60 BPM | RESPIRATION RATE: 16 BRPM | DIASTOLIC BLOOD PRESSURE: 90 MMHG | WEIGHT: 211.2 LBS | OXYGEN SATURATION: 97 % | SYSTOLIC BLOOD PRESSURE: 132 MMHG | TEMPERATURE: 98 F

## 2023-04-12 DIAGNOSIS — M54.40 ACUTE LEFT-SIDED LOW BACK PAIN WITH SCIATICA, SCIATICA LATERALITY UNSPECIFIED: Primary | ICD-10-CM

## 2023-04-12 PROCEDURE — 99213 OFFICE O/P EST LOW 20 MIN: CPT | Performed by: FAMILY MEDICINE

## 2023-04-12 RX ORDER — PREDNISONE 20 MG/1
20 TABLET ORAL DAILY
Qty: 7 TABLET | Refills: 0 | Status: SHIPPED | OUTPATIENT
Start: 2023-04-12

## 2023-04-12 RX ORDER — MULTIPLE VITAMINS W/ MINERALS TAB 9MG-400MCG
1 TAB ORAL DAILY
COMMUNITY

## 2023-04-12 NOTE — TELEPHONE ENCOUNTER
PATIENT JUST LEFT APT WITH ARIEL AND WENT [facility] WHERE [facility] WAS SUPPOSED TO SEND IN A STEROID PACK FOR HIM FOR HIS BACK PAIN, HOWEVER THE PHARMACY HAS NOT RECEIVED IT YET. PLEASE SEND OVER ASAP.     PATIENT> [phone]

## 2023-04-12 NOTE — TELEPHONE ENCOUNTER
Hub is instructed to read the documentation below to patient  Called and s/w Pharmacist César @ [facility]. Per César, patient picked up medication around 2 pm today. César states that patient was there early this am (probably after his appt), and they had not received it yet.

## 2023-04-12 NOTE — PROGRESS NOTES
"Chief Complaint  Injury (Back injury on 4/10/23 with the )    History of Present Illness     Ride on lawnmower got stuck in the mud.  Gustavo tried to lift and push the mower sideways to move it out of the mud.  He felt a strain and non-radiating pain in his low back, mostly left of the lumbar spine.  He has not dysesthesia of the lower extremity or weakness.  He has been applying warm compresses. The pain is slowly resolving but is still interruptive to ADL.  Pain is worse upon sitting.       Objective     Vital Signs:   /90 (BP Location: Left arm, Patient Position: Sitting, Cuff Size: Adult)   Pulse 60   Temp 98 °F (36.7 °C) (Temporal)   Resp 16   Ht 188 cm (74\")   Wt 95.8 kg (211 lb 3.2 oz)   SpO2 97%   BMI 27.12 kg/m²   Current Outpatient Medications on File Prior to Visit   Medication Sig Dispense Refill   • Alogliptin Benzoate 25 MG tablet Take 1 tablet by mouth Daily. 90 tablet 0   • amLODIPine (NORVASC) 10 MG tablet TAKE ONE TABLET BY MOUTH ONCE DAILY 90 tablet 0   • ASPIRIN 81 PO Take 81 mg by mouth Daily.     • betamethasone dipropionate (DIPROLENE) 0.05 % ointment Apply 1 application topically to the appropriate area as directed 2 (Two) Times a Day.     • fexofenadine (ALLEGRA) 180 MG tablet Take 1 tablet by mouth As Needed.     • gabapentin (NEURONTIN) 400 MG capsule Take 1 capsule by mouth 2 (Two) Times a Day. 60 capsule 2   • hydroCHLOROthiazide (HYDRODIURIL) 25 MG tablet TAKE ONE TABLET BY MOUTH EVERY MORNING 90 tablet 0   • metFORMIN (GLUCOPHAGE) 500 MG tablet TAKE ONE TABLET BY MOUTH THREE TIMES DAILY WITH MEALS 270 tablet 0   • montelukast (SINGULAIR) 10 MG tablet As Needed.     • multivitamin with minerals (MULTIVITAMIN MEN PO) Take 1 tablet by mouth Daily.     • niacin (NIASPAN) 500 MG CR tablet Take 1 tablet by mouth Daily.     • olmesartan (BENICAR) 40 MG tablet Take 1 tablet by mouth Daily.     • Omega-3 Fatty Acids (fish oil) 1000 MG capsule capsule Daily.     • potassium " chloride (K-DUR,KLOR-CON) 20 MEQ CR tablet TAKE ONE TABLET BY MOUTH TWICE DAILY 60 tablet 0   • rosuvastatin (CRESTOR) 40 MG tablet TAKE ONE TABLET BY MOUTH ONCE DAILY 90 tablet 0   • [DISCONTINUED] Cholecalciferol (VITAMIN D3 PO) Take  by mouth Daily. (Patient not taking: Reported on 4/12/2023)     • [DISCONTINUED] omega-3 acid ethyl esters (LOVAZA) 1 g capsule TAKE TWO CAPSULES BY MOUTH WITH SUPPER (Patient not taking: Reported on 4/12/2023) 180 capsule 0     No current facility-administered medications on file prior to visit.        Past Medical History:   Diagnosis Date   • Allergies     eggs, corn, green beans- immunotherapy in childhood   • CHASITY (obstructive sleep apnea)    • Tobacco chew use     quit in 1985   • Wears dentures     upper   • Wears glasses       Past Surgical History:   Procedure Laterality Date   • CARDIAC CATHETERIZATION  10/2018    CHD, 1 stent placement   • CIRCUMCISION  08/1964   • COLONOSCOPY  1985   • HEEL SPUR SURGERY Left 2002   • TONSILLECTOMY AND ADENOIDECTOMY  1981   • WISDOM TOOTH EXTRACTION        Family History   Problem Relation Age of Onset   • Hypertension Mother    • Hyperlipidemia Mother    • Liver cancer Mother    • Depression Father    • Alcohol abuse Father    • Hypertension Sister    • Diabetes Sister    • Hypertension Sister    • Diabetes Sister    • Liver cancer Sister    • No Known Problems Daughter    • Heart attack Son    • No Known Problems Maternal Grandmother    • Stroke Maternal Grandfather    • Stroke Paternal Grandmother    • No Known Problems Paternal Grandfather       Social History     Socioeconomic History   • Marital status:    Tobacco Use   • Smoking status: Never     Passive exposure: Never   • Smokeless tobacco: Former     Types: Chew     Quit date: 1990   Vaping Use   • Vaping Use: Never used   Substance and Sexual Activity   • Alcohol use: Not Currently   • Drug use: Not Currently   • Sexual activity: Defer         Clinical Support on  02/13/2023   Component Date Value Ref Range Status   • Glucose 02/13/2023 335 (H)  65 - 99 mg/dL Final   • BUN 02/13/2023 11  6 - 20 mg/dL Final   • Creatinine 02/13/2023 0.78  0.76 - 1.27 mg/dL Final   • Sodium 02/13/2023 136  136 - 145 mmol/L Final   • Potassium 02/13/2023 3.5  3.5 - 5.2 mmol/L Final   • Chloride 02/13/2023 96 (L)  98 - 107 mmol/L Final   • CO2 02/13/2023 25.2  22.0 - 29.0 mmol/L Final   • Calcium 02/13/2023 9.6  8.6 - 10.5 mg/dL Final   • BUN/Creatinine Ratio 02/13/2023 14.1  7.0 - 25.0 Final   • Anion Gap 02/13/2023 14.8  5.0 - 15.0 mmol/L Final   • eGFR 02/13/2023 103.4  >60.0 mL/min/1.73 Final   Appointment on 01/27/2023   Component Date Value Ref Range Status   • Glucose 01/27/2023 293 (H)  65 - 99 mg/dL Final   • BUN 01/27/2023 13  6 - 20 mg/dL Final   • Creatinine 01/27/2023 0.86  0.76 - 1.27 mg/dL Final   • Sodium 01/27/2023 137  136 - 145 mmol/L Final   • Potassium 01/27/2023 3.4 (L)  3.5 - 5.2 mmol/L Final   • Chloride 01/27/2023 97 (L)  98 - 107 mmol/L Final   • CO2 01/27/2023 26.8  22.0 - 29.0 mmol/L Final   • Calcium 01/27/2023 9.9  8.6 - 10.5 mg/dL Final   • BUN/Creatinine Ratio 01/27/2023 15.1  7.0 - 25.0 Final   • Anion Gap 01/27/2023 13.2  5.0 - 15.0 mmol/L Final   • eGFR 01/27/2023 100.4  >60.0 mL/min/1.73 Final   Office Visit on 01/18/2023   Component Date Value Ref Range Status   • Glucose 01/18/2023 238 (H)  65 - 99 mg/dL Final   • BUN 01/18/2023 12  6 - 20 mg/dL Final   • Creatinine 01/18/2023 0.72 (L)  0.76 - 1.27 mg/dL Final   • Sodium 01/18/2023 137  136 - 145 mmol/L Final   • Potassium 01/18/2023 3.3 (L)  3.5 - 5.2 mmol/L Final   • Chloride 01/18/2023 99  98 - 107 mmol/L Final   • CO2 01/18/2023 26.1  22.0 - 29.0 mmol/L Final   • Calcium 01/18/2023 9.5  8.6 - 10.5 mg/dL Final   • Total Protein 01/18/2023 7.2  6.0 - 8.5 g/dL Final   • Albumin 01/18/2023 4.5  3.5 - 5.2 g/dL Final   • ALT (SGPT) 01/18/2023 47 (H)  1 - 41 U/L Final   • AST (SGOT) 01/18/2023 60 (H)  1 - 40 U/L  Final   • Alkaline Phosphatase 01/18/2023 67  39 - 117 U/L Final   • Total Bilirubin 01/18/2023 0.8  0.0 - 1.2 mg/dL Final   • Globulin 01/18/2023 2.7  gm/dL Final   • A/G Ratio 01/18/2023 1.7  g/dL Final   • BUN/Creatinine Ratio 01/18/2023 16.7  7.0 - 25.0 Final   • Anion Gap 01/18/2023 11.9  5.0 - 15.0 mmol/L Final   • eGFR 01/18/2023 105.9  >60.0 mL/min/1.73 Final   • Hemoglobin A1C 01/18/2023 8.1 (H)  3.5 - 5.6 % Final         Physical Exam     Standing and a little pacing in the exam room after sitting for about 4 minutes.    Flattening of the lumbar curvature  Unable to flex at the waist.  Full back bend, rotation and lateral bend  Negative straight leg test on the right  Positive straight leg test at  70 degrees (supine position) on the left    Result Review  Data Reviewed:{ Labs  Result Review  Imaging  Med Tab  Media :23}                     Assessment and Plan {CC Problem List  Visit Diagnosis  ROS  Review (Popup)  Health Maintenance  Quality  BestPractice  Medications  SmartSets  SnapShot Encounters  Media :23}   Diagnoses and all orders for this visit:    1. Acute left-sided low back pain with sciatica, sciatica laterality unspecified (Primary)  Comments:  MDM: Disc bulging.  Do a lot of ambulation.  Warm compress for comfort.  No NSAID while taking prednisone.  Okay to take Tylenol as needed.  Orders:  -     predniSONE (DELTASONE) 20 MG tablet; Take 1 tablet by mouth Daily.  Dispense: 7 tablet; Refill: 0          Follow Up {Instructions Charge Capture  Follow-up Communications :23}     Patient was given instructions and counseling regarding his condition or for health maintenance advice. Please see specific information pulled into the AVS (placed there by myself) if appropriate.    Return if symptoms worsen or fail to improve.  Mirtha Marcos MD

## 2023-04-14 DIAGNOSIS — E87.6 HYPOKALEMIA: ICD-10-CM

## 2023-04-14 RX ORDER — ALOGLIPTIN 6.25 MG/1
TABLET, FILM COATED ORAL
Qty: 90 TABLET | Refills: 0 | OUTPATIENT
Start: 2023-04-14

## 2023-04-14 NOTE — TELEPHONE ENCOUNTER
Rx Refill Note  Requested Prescriptions     Pending Prescriptions Disp Refills   • potassium chloride (K-DUR,KLOR-CON) 20 MEQ CR tablet [Pharmacy Med Name: potassium chloride ER 20 mEq tablet,extended release(part/cryst)] 60 tablet 0     Sig: TAKE ONE TABLET BY MOUTH TWICE DAILY   • metFORMIN (GLUCOPHAGE) 500 MG tablet [Pharmacy Med Name: metformin 500 mg tablet] 270 tablet 0     Sig: TAKE ONE TABLET BY MOUTH THREE TIMES DAILY WITH MEALS     Refused Prescriptions Disp Refills   • Alogliptin Benzoate 6.25 MG tablet [Pharmacy Med Name: alogliptin 6.25 mg tablet] 90 tablet 0     Sig: TAKE ONE TABLET BY MOUTH ONCE DAILY     Refused By: IGNACIA PLATA     Reason for Refusal: Refill not appropriate      Last office visit with prescribing clinician: 4/12/2023   Last telemedicine visit with prescribing clinician: Visit date not found   Next office visit with prescribing clinician: Visit date not found                         Would you like a call back once the refill request has been completed: [] Yes [] No    If the office needs to give you a call back, can they leave a voicemail: [] Yes [] No    Ignacia Plata MA  04/14/23, 14:25 EDT

## 2023-04-18 RX ORDER — POTASSIUM CHLORIDE 20 MEQ/1
TABLET, EXTENDED RELEASE ORAL
Qty: 60 TABLET | Refills: 0 | Status: SHIPPED | OUTPATIENT
Start: 2023-04-18

## 2023-04-20 DIAGNOSIS — E08.42 DIABETIC POLYNEUROPATHY ASSOCIATED WITH DIABETES MELLITUS DUE TO UNDERLYING CONDITION: ICD-10-CM

## 2023-04-20 RX ORDER — GABAPENTIN 400 MG/1
CAPSULE ORAL
Qty: 60 CAPSULE | Refills: 2 | Status: SHIPPED | OUTPATIENT
Start: 2023-04-20

## 2023-04-20 NOTE — TELEPHONE ENCOUNTER
Rx Refill Note  Requested Prescriptions     Pending Prescriptions Disp Refills   • gabapentin (NEURONTIN) 400 MG capsule [Pharmacy Med Name: gabapentin 400 mg capsule] 60 capsule 2     Sig: TAKE ONE CAPSULE BY MOUTH TWICE DAILY      Last office visit with prescribing clinician: 4/12/2023   Last telemedicine visit with prescribing clinician: Visit date not found   Next office visit with prescribing clinician: Visit date not found                         Would you like a call back once the refill request has been completed: [] Yes [] No    If the office needs to give you a call back, can they leave a voicemail: [] Yes [] No    Leah Encinas LPN  04/20/23, 16:04 EDT

## 2023-05-03 DIAGNOSIS — E11.9 TYPE 2 DIABETES MELLITUS WITHOUT COMPLICATION, WITHOUT LONG-TERM CURRENT USE OF INSULIN: ICD-10-CM

## 2023-05-03 RX ORDER — ALOGLIPTIN 25 MG/1
TABLET, FILM COATED ORAL
Qty: 90 TABLET | Refills: 0 | Status: SHIPPED | OUTPATIENT
Start: 2023-05-03

## 2023-05-03 RX ORDER — HYDROCHLOROTHIAZIDE 25 MG/1
TABLET ORAL
Qty: 90 TABLET | Refills: 0 | Status: SHIPPED | OUTPATIENT
Start: 2023-05-03

## 2023-05-23 DIAGNOSIS — E87.6 HYPOKALEMIA: ICD-10-CM

## 2023-05-23 DIAGNOSIS — I10 ESSENTIAL (PRIMARY) HYPERTENSION: ICD-10-CM

## 2023-05-23 RX ORDER — POTASSIUM CHLORIDE 20 MEQ/1
TABLET, EXTENDED RELEASE ORAL
Qty: 60 TABLET | Refills: 0 | Status: SHIPPED | OUTPATIENT
Start: 2023-05-23

## 2023-05-23 RX ORDER — AMLODIPINE BESYLATE 10 MG/1
TABLET ORAL
Qty: 90 TABLET | Refills: 0 | Status: SHIPPED | OUTPATIENT
Start: 2023-05-23

## 2023-05-23 RX ORDER — OMEGA-3-ACID ETHYL ESTERS 1 G/1
CAPSULE, LIQUID FILLED ORAL
Qty: 180 CAPSULE | Refills: 0 | Status: SHIPPED | OUTPATIENT
Start: 2023-05-23

## 2023-05-30 DIAGNOSIS — E78.2 MIXED HYPERLIPIDEMIA: ICD-10-CM

## 2023-05-30 RX ORDER — ROSUVASTATIN CALCIUM 40 MG/1
TABLET, COATED ORAL
Qty: 90 TABLET | Refills: 0 | Status: SHIPPED | OUTPATIENT
Start: 2023-05-30

## 2023-05-30 NOTE — PROGRESS NOTES
NAME@ presents to [facility]      Chief Complaint    Diabetes    HPI     Memory Loss:  Altered Mentation: No.    Tremors:  No.    Eye Exam Date:  2021  Blurred Vision:  No.    Frequent Urination:   No.    Gastroenterology: Nausea: No. Increased Appetite: No.     Increased Thirst: No.        Current Outpatient Medications:   •  Alogliptin Benzoate 6.25 MG tablet, TAKE ONE TABLET BY MOUTH ONCE DAILY, Disp: 90 tablet, Rfl: 0  •  amLODIPine (NORVASC) 10 MG tablet, TAKE ONE TABLET BY MOUTH ONCE DAILY, Disp: 90 tablet, Rfl: 0  •  ASPIRIN 81 PO, Take 81 mg by mouth Daily., Disp: , Rfl:   •  betamethasone dipropionate (DIPROLENE) 0.05 % ointment, Apply 1 application topically to the appropriate area as directed 2 (Two) Times a Day., Disp: , Rfl:   •  Cholecalciferol (VITAMIN D3 PO), Take  by mouth Daily., Disp: , Rfl:   •  fexofenadine (ALLEGRA) 180 MG tablet, Take 1 tablet by mouth As Needed., Disp: , Rfl:   •  gabapentin (NEURONTIN) 300 MG capsule, TAKE ONE CAPSULE BY MOUTH TWICE DAILY, Disp: 180 capsule, Rfl: 0  •  hydroCHLOROthiazide (HYDRODIURIL) 25 MG tablet, TAKE ONE TABLET BY MOUTH EVERY MORNING, Disp: 90 tablet, Rfl: 0  •  KLOR-CON 20 MEQ CR tablet, TAKE ONE TABLET BY MOUTH ONCE DAILY WITH FOOD, Disp: 90 tablet, Rfl: 0  •  metFORMIN (GLUCOPHAGE) 500 MG tablet, TAKE ONE TABLET BY MOUTH THREE TIMES DAILY WITH MEALS, Disp: 270 tablet, Rfl: 0  •  montelukast (SINGULAIR) 10 MG tablet, As Needed., Disp: , Rfl:   •  niacin (NIASPAN) 500 MG CR tablet, Take 500 mg by mouth Daily., Disp: , Rfl:   •  Omega-3 Fatty Acids (fish oil) 1000 MG capsule capsule, Daily., Disp: , Rfl:   •  rosuvastatin (CRESTOR) 40 MG tablet, Take 1 tablet by mouth Daily., Disp: 90 tablet, Rfl: 0  •  olmesartan (BENICAR) 40 MG tablet, Take 40 mg by mouth Daily., Disp: , Rfl:   •  omega-3 acid ethyl esters (LOVAZA) 1 g capsule, TAKE TWO CAPSULES BY MOUTH WITH SUPPER, Disp: , Rfl:   •  SITagliptin (JANUVIA) 25 MG tablet,  Take 25 mg by mouth Daily., Disp: , Rfl:      Allergies   Allergen Reactions   • Eggs Or Egg-Derived Products Other (See Comments)     Positive allergy test   • Lisinopril Other (See Comments)     drowsy   • Penicillins Hives   • Tenoretic [Atenolol-Chlorthalidone] Other (See Comments)     ED        Past Medical History:   Diagnosis Date   • Allergies     eggs, corn, green beans- immunotherapy in childhood   • CHD (coronary heart disease) 2018   • DM II (diabetes mellitus, type II), controlled ([facility])    • Hyperlipidemia 1999   • Hypertension 2002   • CHASITY (obstructive sleep apnea)    • Tobacco chew use     quit in 1985   • Wears dentures     upper   • Wears glasses        Past Surgical History:   Procedure Laterality Date   • CARDIAC CATHETERIZATION  10/2018    CHD, 1 stent placement   • CIRCUMCISION  08/1964   • COLONOSCOPY  1985   • HEEL SPUR SURGERY Left 2002   • TONSILLECTOMY AND ADENOIDECTOMY  1981   • WISDOM TOOTH EXTRACTION          Family History   Problem Relation Age of Onset   • Hypertension Mother    • Hyperlipidemia Mother    • Liver cancer Mother    • Depression Father    • Alcohol abuse Father    • Hypertension Sister    • Diabetes Sister    • Hypertension Sister    • Diabetes Sister    • Liver cancer Sister    • No Known Problems Daughter    • Heart attack Son    • No Known Problems Maternal Grandmother    • Stroke Maternal Grandfather    • Stroke Paternal Grandmother    • No Known Problems Paternal Grandfather         Social History     Socioeconomic History   • Marital status:    Tobacco Use   • Smoking status: Never   • Smokeless tobacco: Former     Types: Chew     Quit date: 1990   Vaping Use   • Vaping Use: Never used   Substance and Sexual Activity   • Alcohol use: Not Currently   • Drug use: Not Currently   • Sexual activity: Defer        ROS: As in HPI      Objective   Vital Signs:  /88 (BP Location: Left arm, Patient Position: Sitting, Cuff Size: Adult)   Pulse 63   Temp 98.4 °F  "(36.9 °C) (Temporal)   Resp 18   Ht 188 cm (74\")   Wt 95.8 kg (211 lb 3.2 oz)   SpO2 97%   BMI 27.12 kg/m²   Estimated body mass index is 27.12 kg/m² as calculated from the following:    Height as of this encounter: 188 cm (74\").    Weight as of this encounter: 95.8 kg (211 lb 3.2 oz).        Physical Exam  Constitutional:       Appearance: Normal appearance.      Comments: Appearance N.A.D., pleasant, excellent energy, well-kept.                  Cardiovascular:      Rate and Rhythm: Normal rate and regular rhythm.      Heart sounds: No murmur heard.  Pulmonary:      Comments: Clear to  auscultation bilaterally, excellent air movement throughout the lung fields  Abdominal:      Palpations: Abdomen is soft. There is no mass (palpable).      Tenderness: There is no abdominal tenderness. There is no guarding.      Comments: Bowel sounds: Normal pitch & frequency x 4 quadrants   Musculoskeletal:      Comments: Lower Extremities: No skin changes.  Toes are warm and pink.  Peripheral Pulses: Normal.   Neurological:      Comments: Vibratory Sense: Normal.  Propiocetion: Normal.  Pin Prick: Normal  Tremors: No         Result Review :                    Assessment and Plan   Diagnoses and all orders for this visit:    1. Type 2 diabetes mellitus without complication, without long-term current use of insulin (HCC) (Primary)  Assessment & Plan:  MDM: blood glucose has improved per patient's report.  Naif A1c    Orders:  -     Hemoglobin A1c  -     Cancel: MicroAlbumin, Urine, Random - Urine, Clean Catch  -     MicroAlbumin, Urine, Random - Urine, Clean Catch    2. Benign hypertension  Assessment & Plan:  MDM: Stable. Continue present medications without changes.      Orders:  -     Comprehensive metabolic panel    3. Mixed hyperlipidemia  Assessment & Plan:  MDM: Continue statin for endothelial protection from elevated lipids and from diabetes mellitus    Orders:  -     Lipid panel    4. Atherosclerosis of native " coronary artery of native heart without angina pectoris    5. Encounter for immunization  Comments:  MDM: Health prevention  Orders:  -     FluLaval/Fluzone >6 mos ([id number])      Follow Up {Instructions Charge Capture  Follow-up Communications :23}  Return in about 3 months (around 1/20/2023) for Cor.    Patient was given instructions and counseling regarding his condition or for health maintenance advice. Please see specific information pulled into the AVS if appropriate.   There are no Patient Instructions on file for this visit.        "" I fell down three days ago with my walker and hit my right shoulder, knee pain and hand pain""    triage note above.  pt a&ox4, speaks full sentences, walks w walker baseline.  pt reports mechanical fall 3 days ago feeling dizzy after fall, so c/o L shoulder and L knee pain. denies syncope, LOC, head trauma, fever, cp, sob, n/v, HA, lightheadedness, current dizziness. "" I fell down three days ago with my walker and hit my right shoulder, knee pain and hand pain""    triage note above.  pt a&ox4, speaks full sentences, walks w walker baseline.  pt reports mechanical fall 3 days ago feeling dizzy after fall, so c/o R shoulder and R knee pain. denies syncope, LOC, head trauma, fever, cp, sob, n/v, HA, lightheadedness, current dizziness.

## 2023-07-25 ENCOUNTER — OFFICE VISIT (OUTPATIENT)
Dept: FAMILY MEDICINE CLINIC | Age: 58
End: 2023-07-25
Payer: COMMERCIAL

## 2023-07-25 VITALS
WEIGHT: 205 LBS | DIASTOLIC BLOOD PRESSURE: 78 MMHG | HEIGHT: 62 IN | OXYGEN SATURATION: 96 % | RESPIRATION RATE: 17 BRPM | SYSTOLIC BLOOD PRESSURE: 126 MMHG | HEART RATE: 62 BPM | BODY MASS INDEX: 37.73 KG/M2 | TEMPERATURE: 98.6 F

## 2023-07-25 DIAGNOSIS — I25.10 ATHEROSCLEROSIS OF NATIVE CORONARY ARTERY OF NATIVE HEART WITHOUT ANGINA PECTORIS: ICD-10-CM

## 2023-07-25 DIAGNOSIS — I10 BENIGN HYPERTENSION: Primary | ICD-10-CM

## 2023-07-25 DIAGNOSIS — E11.9 TYPE 2 DIABETES MELLITUS WITHOUT COMPLICATION, WITHOUT LONG-TERM CURRENT USE OF INSULIN: ICD-10-CM

## 2023-07-25 DIAGNOSIS — E87.6 HYPOKALEMIA: ICD-10-CM

## 2023-07-25 PROCEDURE — 80053 COMPREHEN METABOLIC PANEL: CPT | Performed by: FAMILY MEDICINE

## 2023-07-25 PROCEDURE — 99214 OFFICE O/P EST MOD 30 MIN: CPT | Performed by: FAMILY MEDICINE

## 2023-07-25 PROCEDURE — 83036 HEMOGLOBIN GLYCOSYLATED A1C: CPT | Performed by: FAMILY MEDICINE

## 2023-07-25 PROCEDURE — 36415 COLL VENOUS BLD VENIPUNCTURE: CPT | Performed by: FAMILY MEDICINE

## 2023-07-25 NOTE — PROGRESS NOTES
Venipuncture Blood Specimen Collection  Venipuncture performed in left arm via [place]fly by Jer Santos MA with good hemostasis. Patient tolerated the procedure well without complications.   07/25/23   Jer Santos MA

## 2023-07-25 NOTE — PROGRESS NOTES
Gustavo Singh presents to [facility]      Chief Complaint  Hypertension     HPI     Respiratory:  SOA:  no. Exertional dyspnea: no.  Dyspnea at rest: no.  Flights of stairs climbable: 3.  Cardiology:  Chest pain: no.  Dizziness: no. Easily fatigued: no.  Pedal edema: no.  Light-headiness: no.  Symptoms of claudication: no.  Orthopnea: no.  Palpitations: no. Tachycardia: no.  Ophthalmology:  Last eye exam date: 1/2022 . Vision changes: none.  Gastroenterology:  Heartburn: no.  Nausea: no.    ROS: Alogliptin increased to 25 mg on 1/19/23.      Current Outpatient Medications:     Alogliptin Benzoate 25 MG tablet, TAKE ONE TABLET BY MOUTH EVERY DAY, Disp: 90 tablet, Rfl: 0    amLODIPine (NORVASC) 10 MG tablet, TAKE ONE TABLET BY MOUTH ONCE DAILY, Disp: 90 tablet, Rfl: 0    ASPIRIN 81 PO, Take 81 mg by mouth Daily., Disp: , Rfl:     betamethasone dipropionate (DIPROLENE) 0.05 % ointment, Apply 1 application  topically to the appropriate area as directed 2 (Two) Times a Day., Disp: , Rfl:     fexofenadine (ALLEGRA) 180 MG tablet, Take 1 tablet by mouth As Needed., Disp: , Rfl:     gabapentin (NEURONTIN) 400 MG capsule, TAKE ONE CAPSULE BY MOUTH TWICE DAILY, Disp: 60 capsule, Rfl: 2    hydroCHLOROthiazide (HYDRODIURIL) 25 MG tablet, TAKE ONE TABLET BY MOUTH EVERY MORNING, Disp: 90 tablet, Rfl: 0    metFORMIN (GLUCOPHAGE) 500 MG tablet, TAKE ONE TABLET BY MOUTH THREE TIMES DAILY WITH MEALS, Disp: 270 tablet, Rfl: 0    montelukast (SINGULAIR) 10 MG tablet, As Needed., Disp: , Rfl:     multivitamin with minerals tablet tablet, Take 1 tablet by mouth Daily., Disp: , Rfl:     niacin (NIASPAN) 500 MG CR tablet, Take 1 tablet by mouth Daily., Disp: , Rfl:     olmesartan (BENICAR) 40 MG tablet, Take 1 tablet by mouth Daily., Disp: , Rfl:     omega-3 acid ethyl esters (LOVAZA) 1 g capsule, TAKE TWO CAPSULES BY MOUTH WITH SUPPER, Disp: 180 capsule, Rfl: 0    Omega-3 Fatty Acids (fish oil) 1000 MG capsule  capsule, Daily., Disp: , Rfl:     potassium chloride (K-DUR,KLOR-CON) 20 MEQ CR tablet, TAKE ONE TABLET BY MOUTH TWICE DAILY, Disp: 60 tablet, Rfl: 0    predniSONE (DELTASONE) 20 MG tablet, Take 1 tablet by mouth Daily., Disp: 7 tablet, Rfl: 0    rosuvastatin (CRESTOR) 40 MG tablet, TAKE ONE TABLET BY MOUTH ONCE DAILY, Disp: 90 tablet, Rfl: 0     Allergies   Allergen Reactions    Tenoretic [Atenolol-Chlorthalidone] Other (See Comments)     ED    Eggs Or Egg-Derived Products Other (See Comments)     Positive allergy test    Lisinopril Other (See Comments)     drowsy    Penicillins Hives        Past Medical History:   Diagnosis Date    Allergies     eggs, corn, green beans- immunotherapy in childhood    CHASITY (obstructive sleep apnea)     Tobacco chew use     quit in 1985    Wears dentures     upper    Wears glasses        Past Surgical History:   Procedure Laterality Date    CARDIAC CATHETERIZATION  10/2018    CHD, 1 stent placement    CIRCUMCISION  08/1964    COLONOSCOPY  1985    HEEL SPUR SURGERY Left 2002    TONSILLECTOMY AND ADENOIDECTOMY  1981    WISDOM TOOTH EXTRACTION          Family History   Problem Relation Age of Onset    Hypertension Mother     Hyperlipidemia Mother     Liver cancer Mother     Depression Father     Alcohol abuse Father     Hypertension Sister     Diabetes Sister     Hypertension Sister     Diabetes Sister     Liver cancer Sister     No Known Problems Daughter     Heart attack Son     No Known Problems Maternal Grandmother     Stroke Maternal Grandfather     Stroke Paternal Grandmother     No Known Problems Paternal Grandfather         Social History     Socioeconomic History    Marital status:    Tobacco Use    Smoking status: Never     Passive exposure: Never    Smokeless tobacco: Former     Types: Chew     Quit date: 1990   Vaping Use    Vaping Use: Never used   Substance and Sexual Activity    Alcohol use: Not Currently    Drug use: Not Currently    Sexual activity: Defer     "        Objective   Vital Signs:  /78 (BP Location: Left arm, Patient Position: Sitting, Cuff Size: Adult)   Pulse 62   Temp 98.6 °F (37 °C) (Temporal)   Resp 17   Ht 157.5 cm (62\")   Wt 93 kg (205 lb)   SpO2 96%   BMI 37.49 kg/m²   Estimated body mass index is 37.49 kg/m² as calculated from the following:    Height as of this encounter: 157.5 cm (62\").    Weight as of this encounter: 93 kg (205 lb).        Physical Exam  General:  No acute distress, cheerful, well kept, interactive, good energy level.  Cardiac:  Heart regular rate and rhythm without murmur.  Respiratory:  Lungs clear to auscultation bilaterally and excellent air movement throughout.  Pulses:                            Right          Left         Bruit  Carotid                2+              2+           No  Radial                 2+              2+  Abd Ao               Not bounding  Femoral             Not palpable bilaterally  DP                      2+              2+  PT                      2+              2+  Venous:  Absent JVD. Absent lower extremity varicosities.  Dermatology:  Non-dystrophic toe nails. No Integument changes of the toes, feet, ankles, shins.  Lower Extremities:  No leg edema.  Toes are warm and pink    Result Review :                    Assessment and Plan   Diagnoses and all orders for this visit:    1. Benign hypertension (Primary)  Assessment & Plan:  MDM: Stable. Continue present medications without changes.      2. Type 2 diabetes mellitus without complication, without long-term current use of insulin  Assessment & Plan:  MDM: Patient states FBG is elevated to 160 inspite of increase in Alogliptin.  Naif A1c    Orders:  -     Hemoglobin A1c    3. Atherosclerosis of native coronary artery of native heart without angina pectoris  Assessment & Plan:  MDM: Stable. Continue present medications without changes.      4. Hypokalemia  Assessment & Plan:  MDM: K+ supplement increased to twice a day.  Naif " level    Orders:  -     Comprehensive metabolic panel             Follow Up   Return in about 3 months (around 10/25/2023) for Diabetes.    Patient was given instructions and counseling regarding his condition or for health maintenance advice. Please see specific information pulled into the AVS if appropriate.     Mirtha Marcos MD

## 2023-07-26 LAB
ALBUMIN SERPL-MCNC: 4.6 G/DL (ref 3.5–5.2)
ALBUMIN/GLOB SERPL: 2 G/DL
ALP SERPL-CCNC: 55 U/L (ref 39–117)
ALT SERPL W P-5'-P-CCNC: 41 U/L (ref 1–41)
ANION GAP SERPL CALCULATED.3IONS-SCNC: 15.2 MMOL/L (ref 5–15)
AST SERPL-CCNC: 33 U/L (ref 1–40)
BILIRUB SERPL-MCNC: 0.7 MG/DL (ref 0–1.2)
BUN SERPL-MCNC: 8 MG/DL (ref 6–20)
BUN/CREAT SERPL: 10.7 (ref 7–25)
CALCIUM SPEC-SCNC: 9.7 MG/DL (ref 8.6–10.5)
CHLORIDE SERPL-SCNC: 100 MMOL/L (ref 98–107)
CO2 SERPL-SCNC: 25.8 MMOL/L (ref 22–29)
CREAT SERPL-MCNC: 0.75 MG/DL (ref 0.76–1.27)
EGFRCR SERPLBLD CKD-EPI 2021: 104.6 ML/MIN/1.73
GLOBULIN UR ELPH-MCNC: 2.3 GM/DL
GLUCOSE SERPL-MCNC: 150 MG/DL (ref 65–99)
HBA1C MFR BLD: 7.7 % (ref 4.8–5.6)
POTASSIUM SERPL-SCNC: 3.5 MMOL/L (ref 3.5–5.2)
PROT SERPL-MCNC: 6.9 G/DL (ref 6–8.5)
SODIUM SERPL-SCNC: 141 MMOL/L (ref 136–145)

## 2023-08-01 DIAGNOSIS — I10 BENIGN HYPERTENSION: Primary | ICD-10-CM

## 2023-08-01 DIAGNOSIS — E11.9 TYPE 2 DIABETES MELLITUS WITHOUT COMPLICATION, WITHOUT LONG-TERM CURRENT USE OF INSULIN: ICD-10-CM

## 2023-08-01 DIAGNOSIS — E08.42 DIABETIC POLYNEUROPATHY ASSOCIATED WITH DIABETES MELLITUS DUE TO UNDERLYING CONDITION: ICD-10-CM

## 2023-08-01 RX ORDER — ALOGLIPTIN 25 MG/1
TABLET, FILM COATED ORAL
Qty: 90 TABLET | Refills: 0 | Status: SHIPPED | OUTPATIENT
Start: 2023-08-01

## 2023-08-01 RX ORDER — HYDROCHLOROTHIAZIDE 25 MG/1
TABLET ORAL
Qty: 90 TABLET | Refills: 0 | Status: SHIPPED | OUTPATIENT
Start: 2023-08-01

## 2023-08-03 ENCOUNTER — TELEPHONE (OUTPATIENT)
Dept: FAMILY MEDICINE CLINIC | Age: 58
End: 2023-08-03
Payer: COMMERCIAL

## 2023-08-03 RX ORDER — GABAPENTIN 400 MG/1
CAPSULE ORAL
Qty: 180 CAPSULE | Refills: 1 | Status: SHIPPED | OUTPATIENT
Start: 2023-08-03

## 2023-08-15 DIAGNOSIS — E87.6 HYPOKALEMIA: ICD-10-CM

## 2023-08-15 RX ORDER — POTASSIUM CHLORIDE 20 MEQ/1
TABLET, EXTENDED RELEASE ORAL
Qty: 60 TABLET | Refills: 0 | Status: SHIPPED | OUTPATIENT
Start: 2023-08-15

## 2023-08-15 NOTE — TELEPHONE ENCOUNTER
Rx Refill Note  Requested Prescriptions     Pending Prescriptions Disp Refills    potassium chloride (K-DUR,KLOR-CON) 20 MEQ CR tablet [Pharmacy Med Name: potassium chloride ER 20 mEq tablet,extended release(part/cryst)] 60 tablet 0     Sig: TAKE ONE TABLET BY MOUTH TWICE DAILY      Last office visit with prescribing clinician: 7/25/2023   Last telemedicine visit with prescribing clinician: Visit date not found   Next office visit with prescribing clinician: NONE                        Would you like a call back once the refill request has been completed: [] Yes [] No    If the office needs to give you a call back, can they leave a voicemail: [] Yes [] No    Leah Encinas LPN  08/15/23, 11:07 EDT

## 2023-08-21 DIAGNOSIS — J30.1 ALLERGIC RHINITIS DUE TO POLLEN: ICD-10-CM

## 2023-08-21 NOTE — TELEPHONE ENCOUNTER
Rx Refill Note    LAST RX SENT ON 5- FOR A 90 DAY SUPPLY WITH 3 ADDITIONAL REFILLS.     Requested Prescriptions     Pending Prescriptions Disp Refills    montelukast (SINGULAIR) 10 MG tablet [Pharmacy Med Name: montelukast 10 mg tablet] 90 tablet 3     Sig: TAKE ONE TABLET BY MOUTH IN THE EVENING AS NEEDED      Last office visit with prescribing clinician: 7/25/2023   Last telemedicine visit with prescribing clinician: Visit date not found   Next office visit with prescribing clinician: 9/6/2023                         Would you like a call back once the refill request has been completed: [] Yes [] No    If the office needs to give you a call back, can they leave a voicemail: [] Yes [] No    Leah Encinas LPN  08/21/23, 15:43 EDT

## 2023-08-22 RX ORDER — MONTELUKAST SODIUM 10 MG/1
TABLET ORAL
Qty: 90 TABLET | Refills: 3 | Status: SHIPPED | OUTPATIENT
Start: 2023-08-22

## 2023-08-29 DIAGNOSIS — E78.1 HYPERTRIGLYCERIDEMIA: Primary | ICD-10-CM

## 2023-08-29 NOTE — TELEPHONE ENCOUNTER
Rx Refill Note  Requested Prescriptions     Pending Prescriptions Disp Refills    omega-3 acid ethyl esters (LOVAZA) 1 g capsule [Pharmacy Med Name: omega-3 acid ethyl esters 1 gram capsule] 180 capsule 0     Sig: TAKE TWO CAPSULES BY MOUTH WITH SUPPER      Last office visit with prescribing clinician: 7/25/2023   Last telemedicine visit with prescribing clinician: Visit date not found   Next office visit with prescribing clinician: 9/6/2023                         Would you like a call back once the refill request has been completed: [] Yes [] No    If the office needs to give you a call back, can they leave a voicemail: [] Yes [] No    Leah Encinas LPN  08/29/23, 15:41 EDT

## 2023-08-31 RX ORDER — OMEGA-3-ACID ETHYL ESTERS 1 G/1
CAPSULE, LIQUID FILLED ORAL
Qty: 180 CAPSULE | Refills: 0 | Status: SHIPPED | OUTPATIENT
Start: 2023-08-31

## 2023-09-05 DIAGNOSIS — I10 ESSENTIAL (PRIMARY) HYPERTENSION: ICD-10-CM

## 2023-09-05 DIAGNOSIS — E78.2 MIXED HYPERLIPIDEMIA: ICD-10-CM

## 2023-09-06 ENCOUNTER — OFFICE VISIT (OUTPATIENT)
Dept: FAMILY MEDICINE CLINIC | Facility: CLINIC | Age: 58
End: 2023-09-06
Payer: COMMERCIAL

## 2023-09-06 VITALS
TEMPERATURE: 98.4 F | HEIGHT: 73 IN | DIASTOLIC BLOOD PRESSURE: 78 MMHG | HEART RATE: 76 BPM | BODY MASS INDEX: 27.17 KG/M2 | SYSTOLIC BLOOD PRESSURE: 134 MMHG | OXYGEN SATURATION: 95 % | RESPIRATION RATE: 16 BRPM | WEIGHT: 205 LBS

## 2023-09-06 DIAGNOSIS — E78.2 MIXED HYPERLIPIDEMIA: ICD-10-CM

## 2023-09-06 DIAGNOSIS — I10 ESSENTIAL (PRIMARY) HYPERTENSION: ICD-10-CM

## 2023-09-06 DIAGNOSIS — L21.9 SEBORRHEIC DERMATITIS: ICD-10-CM

## 2023-09-06 DIAGNOSIS — S90.861A TICK BITE OF RIGHT FOOT, INITIAL ENCOUNTER: Primary | ICD-10-CM

## 2023-09-06 DIAGNOSIS — M25.542 PAIN IN THUMB JOINT WITH MOVEMENT OF LEFT HAND: ICD-10-CM

## 2023-09-06 DIAGNOSIS — M89.8X7 PAIN IN METATARSUS OF RIGHT FOOT: ICD-10-CM
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
## 2023-09-06 PROCEDURE — 86618 LYME DISEASE ANTIBODY: CPT | Performed by: FAMILY MEDICINE

## 2023-09-06 PROCEDURE — 87798 DETECT AGENT NOS DNA AMP: CPT | Performed by: FAMILY MEDICINE

## 2023-09-06 RX ORDER — AMLODIPINE BESYLATE 10 MG/1
10 TABLET ORAL DAILY
Qty: 90 TABLET | Refills: 0 | Status: SHIPPED | OUTPATIENT
Start: 2023-09-06

## 2023-09-06 RX ORDER — ROSUVASTATIN CALCIUM 40 MG/1
TABLET, COATED ORAL
Qty: 90 TABLET | Refills: 0 | OUTPATIENT
Start: 2023-09-06

## 2023-09-06 RX ORDER — ROSUVASTATIN CALCIUM 40 MG/1
40 TABLET, COATED ORAL DAILY
Qty: 90 TABLET | Refills: 0 | Status: SHIPPED | OUTPATIENT
Start: 2023-09-06

## 2023-09-06 RX ORDER — AMLODIPINE BESYLATE 10 MG/1
TABLET ORAL
Qty: 90 TABLET | Refills: 0 | OUTPATIENT
Start: 2023-09-06

## 2023-09-06 NOTE — TELEPHONE ENCOUNTER
Rx Refill Note  Requested Prescriptions     Pending Prescriptions Disp Refills    rosuvastatin (CRESTOR) 40 MG tablet [Pharmacy Med Name: rosuvastatin 40 mg tablet] 90 tablet 0     Sig: TAKE ONE TABLET BY MOUTH ONCE DAILY    amLODIPine (NORVASC) 10 MG tablet [Pharmacy Med Name: amlodipine 10 mg tablet] 90 tablet 0     Sig: TAKE ONE TABLET BY MOUTH ONCE DAILY      Last office visit with prescribing clinician: 7/25/2023   Last telemedicine visit with prescribing clinician: Visit date not found   Next office visit with prescribing clinician: 9/6/2023                         Would you like a call back once the refill request has been completed: [] Yes [] No    If the office needs to give you a call back, can they leave a voicemail: [] Yes [] No    Terra Zimmer CMA  09/06/23, 11:48 EDT

## 2023-09-06 NOTE — PROGRESS NOTES
"Chief Complaint  Tick Removal (Tick bite on right foot back in mid June . Middle toe. Pt states he has neuropathy and was unable to feel. Foot was turning purple, swelling and hurt to walk. Slight overall improvement but symptoms have returned. ) and Cyst (Brown cyst like on left of face. )    History of Present Illness   Pain developed in the right foot about mid week of June.  The pain increased in intensity. Since the patient has neuropathy of the feet he then became concerned that there was pain perception.  The dorsum over the 2-4 MTP joints became edematous and purple in discoloration.  Upon examining his foot he found a tiny engorged tick.  He removed the tick.  He had no malaise, myalgias, fever or chills.  The pain resolved, then it returned about 5 weeks later.  No exanthemas.  Pain in the foot upon ambulation.  Feeling fatigued upon wakening, in the morning after 7 hrs of sleep.  He can return to sleep for another 1/2 hr.  Upon getting up and moving, his fatigue resolves.  The weather has been hot and humid.     He notes pain in his left thenar eminence the past two weeks.  He is concerned that it is related to the tick bite. He is right hand dominant. The pain began upon building a deck and holding wood planks in \"awkward position.\"     C/O lesion at his right temple.  It becomes pruritic.  Upon scratching part of it off, the lesion then resumes it's growth.       Objective     Vital Signs:   /78 (BP Location: Right arm, Patient Position: Sitting, Cuff Size: Adult)   Pulse 76   Temp 98.4 °F (36.9 °C) (Infrared)   Resp 16   Ht 185.4 cm (73\")   Wt 93 kg (205 lb)   SpO2 95%   BMI 27.05 kg/m²   Current Outpatient Medications on File Prior to Visit   Medication Sig Dispense Refill    Alogliptin Benzoate 25 MG tablet TAKE ONE TABLET BY MOUTH EVERY DAY 90 tablet 0    ASPIRIN 81 PO Take 81 mg by mouth Daily.      betamethasone dipropionate (DIPROLENE) 0.05 % ointment Apply 1 application  topically " to the appropriate area as directed 2 (Two) Times a Day.      fexofenadine (ALLEGRA) 180 MG tablet Take 1 tablet by mouth As Needed.      gabapentin (NEURONTIN) 400 MG capsule TAKE ONE CAPSULE BY MOUTH TWICE DAILY 180 capsule 1    hydroCHLOROthiazide (HYDRODIURIL) 25 MG tablet TAKE ONE TABLET BY MOUTH EVERY MORNING 90 tablet 0    metFORMIN (GLUCOPHAGE) 500 MG tablet TAKE ONE TABLET BY MOUTH THREE TIMES DAILY WITH MEALS 270 tablet 0    montelukast (SINGULAIR) 10 MG tablet TAKE ONE TABLET BY MOUTH IN THE EVENING AS NEEDED 90 tablet 3    multivitamin with minerals tablet tablet Take 1 tablet by mouth Daily.      niacin (NIASPAN) 500 MG CR tablet Take 1 tablet by mouth Daily.      olmesartan (BENICAR) 40 MG tablet Take 1 tablet by mouth Daily.      omega-3 acid ethyl esters (LOVAZA) 1 g capsule TAKE TWO CAPSULES BY MOUTH WITH SUPPER 180 capsule 0    Omega-3 Fatty Acids (fish oil) 1000 MG capsule capsule Daily.      potassium chloride (K-DUR,KLOR-CON) 20 MEQ CR tablet TAKE ONE TABLET BY MOUTH TWICE DAILY 60 tablet 0    [DISCONTINUED] amLODIPine (NORVASC) 10 MG tablet TAKE ONE TABLET BY MOUTH ONCE DAILY 90 tablet 0    [DISCONTINUED] rosuvastatin (CRESTOR) 40 MG tablet TAKE ONE TABLET BY MOUTH ONCE DAILY 90 tablet 0    predniSONE (DELTASONE) 20 MG tablet Take 1 tablet by mouth Daily. 7 tablet 0     No current facility-administered medications on file prior to visit.        Past Medical History:   Diagnosis Date    Allergies     eggs, corn, green beans- immunotherapy in childhood    CHASITY (obstructive sleep apnea)     Tobacco chew use     quit in 1985    Wears dentures     upper    Wears glasses       Past Surgical History:   Procedure Laterality Date    CARDIAC CATHETERIZATION  10/2018    CHD, 1 stent placement    CIRCUMCISION  08/1964    COLONOSCOPY  1985    HEEL SPUR SURGERY Left 2002    TONSILLECTOMY AND ADENOIDECTOMY  1981    WISDOM TOOTH EXTRACTION        Family History   Problem Relation Age of Onset    Hypertension  Mother     Hyperlipidemia Mother     Liver cancer Mother     Depression Father     Alcohol abuse Father     Hypertension Sister     Diabetes Sister     Hypertension Sister     Diabetes Sister     Liver cancer Sister     No Known Problems Daughter     Heart attack Son     No Known Problems Maternal Grandmother     Stroke Maternal Grandfather     Stroke Paternal Grandmother     No Known Problems Paternal Grandfather       Social History     Socioeconomic History    Marital status:    Tobacco Use    Smoking status: Never     Passive exposure: Never    Smokeless tobacco: Former     Types: Chew     Quit date: 1990   Vaping Use    Vaping Use: Never used   Substance and Sexual Activity    Alcohol use: Not Currently    Drug use: Not Currently    Sexual activity: Defer         Office Visit on 07/25/2023   Component Date Value Ref Range Status    Hemoglobin A1C 07/25/2023 7.70 (H)  4.80 - 5.60 % Final    Glucose 07/25/2023 150 (H)  65 - 99 mg/dL Final    BUN 07/25/2023 8  6 - 20 mg/dL Final    Creatinine 07/25/2023 0.75 (L)  0.76 - 1.27 mg/dL Final    Sodium 07/25/2023 141  136 - 145 mmol/L Final    Potassium 07/25/2023 3.5  3.5 - 5.2 mmol/L Final    Chloride 07/25/2023 100  98 - 107 mmol/L Final    CO2 07/25/2023 25.8  22.0 - 29.0 mmol/L Final    Calcium 07/25/2023 9.7  8.6 - 10.5 mg/dL Final    Total Protein 07/25/2023 6.9  6.0 - 8.5 g/dL Final    Albumin 07/25/2023 4.6  3.5 - 5.2 g/dL Final    ALT (SGPT) 07/25/2023 41  1 - 41 U/L Final    AST (SGOT) 07/25/2023 33  1 - 40 U/L Final    Alkaline Phosphatase 07/25/2023 55  39 - 117 U/L Final    Total Bilirubin 07/25/2023 0.7  0.0 - 1.2 mg/dL Final    Globulin 07/25/2023 2.3  gm/dL Final    A/G Ratio 07/25/2023 2.0  g/dL Final    BUN/Creatinine Ratio 07/25/2023 10.7  7.0 - 25.0 Final    Anion Gap 07/25/2023 15.2 (H)  5.0 - 15.0 mmol/L Final    eGFR 07/25/2023 104.6  >60.0 mL/min/1.73 Final         Physical Exam   Excoriated 1 cm raised hyperpigmented macule with open  non-bleeding base at the left temple  Hyperpigmented circumferential track at the medial aspect of the right 3rd toe .  Minimal edema at the base of the 2-4th MTP joints.  No induration, calor or rubor.    No edema, erythema of the left hand and digits.  No pain upon range of motion of the thumb or wrist.  No pain upon the thumb tucked into the palm and ulnar flexion of the wrist.  Slight tenderness at the carpals at the base of the thumb.    Result Review  Data Reviewed:{ Labs  Result Review  Imaging  Med Tab  Media :23}                     Assessment and Plan {CC Problem List  Visit Diagnosis  ROS  Review (Popup)  Health Maintenance  Quality  BestPractice  Medications  SmartSets  SnapShot Encounters  Media :23}   Diagnoses and all orders for this visit:    1. Tick bite of right foot, initial encounter (Primary)  -     Lyme Disease Total Antibody With Reflex to Immunoassay  -     Rickettsia Species DNA, Real-Time PCR    2. Essential (primary) hypertension  -     amLODIPine (NORVASC) 10 MG tablet; Take 1 tablet by mouth Daily.  Dispense: 90 tablet; Refill: 0    3. Mixed hyperlipidemia  -     rosuvastatin (CRESTOR) 40 MG tablet; Take 1 tablet by mouth Daily.  Dispense: 90 tablet; Refill: 0    4. Seborrheic dermatitis  -     Ambulatory Referral to Dermatology    5. Pain in thumb joint with movement of left hand  Comments:  MDM: tendon strain.  Ibuprofen, stretches as needed    MDM: Patient is out of time frame for prophylactic antibiotic.  Unsure why the tick did not fall off after engorging.  Fatigue upon wakening then improves upon exertion is more likely due to environment (hot humid weather) than due to Lyme disease. Discussed possible false positive test. Patient would like to proceed, even so.      50 min spent upon multiple issues, exam, discussion, treatment, evaluation options and reasoning.    Follow Up {Instructions Charge Capture  Follow-up Communications :23}     Patient was given  instructions and counseling regarding his condition or for health maintenance advice. Please see specific information pulled into the AVS (placed there by myself) if appropriate.    Return if symptoms worsen or fail to improve.  Mirtha Marcos MD

## 2023-09-06 NOTE — PROGRESS NOTES
Venipuncture Blood Specimen Collection  Venipuncture performed in L [facility] by Binta Chapa MA with good hemostasis. Patient tolerated the procedure well without complications.   09/06/23   Binta Chapa MA

## 2023-09-07 ENCOUNTER — TELEPHONE (OUTPATIENT)
Dept: FAMILY MEDICINE CLINIC | Facility: CLINIC | Age: 58
End: 2023-09-07
Payer: COMMERCIAL

## 2023-09-07 NOTE — TELEPHONE ENCOUNTER
HUB to read description below.    IF PT CALLS BACK ASK HIM WHERE WOULD HE LIKE TO GET HIS XRAYS DONE AT [facility]

## 2023-09-08 LAB — B BURGDOR IGG+IGM SER QL IA: NEGATIVE

## 2023-09-11 DIAGNOSIS — M89.8X7 PAIN IN METATARSUS OF RIGHT FOOT: ICD-10-CM

## 2023-09-12 LAB — RICKETTSIA RICKETTSII DNA, RT: NOT DETECTED

## 2023-09-14 DIAGNOSIS — E87.6 HYPOKALEMIA: ICD-10-CM

## 2023-09-14 RX ORDER — POTASSIUM CHLORIDE 20 MEQ/1
TABLET, EXTENDED RELEASE ORAL
Qty: 60 TABLET | Refills: 0 | Status: SHIPPED | OUTPATIENT
Start: 2023-09-14

## 2023-09-21 ENCOUNTER — OFFICE VISIT (OUTPATIENT)
Dept: FAMILY MEDICINE CLINIC | Facility: CLINIC | Age: 58
End: 2023-09-21
Payer: COMMERCIAL

## 2023-09-21 VITALS
HEIGHT: 73 IN | TEMPERATURE: 98.4 F | WEIGHT: 203.4 LBS | DIASTOLIC BLOOD PRESSURE: 68 MMHG | OXYGEN SATURATION: 95 % | HEART RATE: 63 BPM | SYSTOLIC BLOOD PRESSURE: 126 MMHG | RESPIRATION RATE: 16 BRPM | BODY MASS INDEX: 26.96 KG/M2

## 2023-09-21 DIAGNOSIS — M54.50 ACUTE LEFT-SIDED LOW BACK PAIN WITHOUT SCIATICA: Primary | ICD-10-CM

## 2023-09-21 PROCEDURE — 99213 OFFICE O/P EST LOW 20 MIN: CPT | Performed by: FAMILY MEDICINE

## 2023-09-21 RX ORDER — CYCLOBENZAPRINE HCL 10 MG
10 TABLET ORAL NIGHTLY PRN
Qty: 7 TABLET | Refills: 0 | Status: SHIPPED | OUTPATIENT
Start: 2023-09-21

## 2023-09-21 RX ORDER — PREDNISONE 20 MG/1
20 TABLET ORAL DAILY
Qty: 7 TABLET | Refills: 0 | Status: SHIPPED | OUTPATIENT
Start: 2023-09-21

## 2023-09-21 NOTE — PROGRESS NOTES
"Chief Complaint  Back Pain (Pt was working in his garage on 09.20.2023. Lifted wooden box and strained something that caused some pain in lower back. Treated with heating pad, patch and ibuprofen. )    History of Present Illness   Leaned over to  a box that had handles.  Felt a pull in the left side of his low back.  Tightness and non-radiating pain persists.  Sitting is more comfortable than standing.  Will feel a sharp pain in the superior aspect of the buttocks upon turning.  No weakness of the lower extremities or dysesthesia.        Objective     Vital Signs:   /68 (BP Location: Left arm, Patient Position: Sitting, Cuff Size: Adult)   Pulse 63   Temp 98.4 °F (36.9 °C) (Infrared)   Resp 16   Ht 185.4 cm (73\")   Wt 92.3 kg (203 lb 6.4 oz)   SpO2 95%   BMI 26.84 kg/m²   Current Outpatient Medications on File Prior to Visit   Medication Sig Dispense Refill    Alogliptin Benzoate 25 MG tablet TAKE ONE TABLET BY MOUTH EVERY DAY 90 tablet 0    amLODIPine (NORVASC) 10 MG tablet Take 1 tablet by mouth Daily. 90 tablet 0    ASPIRIN 81 PO Take 81 mg by mouth Daily.      betamethasone dipropionate (DIPROLENE) 0.05 % ointment Apply 1 application  topically to the appropriate area as directed 2 (Two) Times a Day.      fexofenadine (ALLEGRA) 180 MG tablet Take 1 tablet by mouth As Needed.      gabapentin (NEURONTIN) 400 MG capsule TAKE ONE CAPSULE BY MOUTH TWICE DAILY 180 capsule 1    hydroCHLOROthiazide (HYDRODIURIL) 25 MG tablet TAKE ONE TABLET BY MOUTH EVERY MORNING 90 tablet 0    metFORMIN (GLUCOPHAGE) 500 MG tablet TAKE ONE TABLET BY MOUTH THREE TIMES DAILY WITH MEALS 270 tablet 0    montelukast (SINGULAIR) 10 MG tablet TAKE ONE TABLET BY MOUTH IN THE EVENING AS NEEDED 90 tablet 3    multivitamin with minerals tablet tablet Take 1 tablet by mouth Daily.      niacin (NIASPAN) 500 MG CR tablet Take 1 tablet by mouth Daily.      olmesartan (BENICAR) 40 MG tablet Take 1 tablet by mouth Daily.      omega-3 " Discussed condition and exacerbating conditions/situations (e.g., dry/arid environments, overhead fans, air conditioners, side effect of medications). acid ethyl esters (LOVAZA) 1 g capsule TAKE TWO CAPSULES BY MOUTH WITH SUPPER 180 capsule 0    potassium chloride (K-DUR,KLOR-CON) 20 MEQ CR tablet TAKE ONE TABLET BY MOUTH TWICE DAILY 60 tablet 0    rosuvastatin (CRESTOR) 40 MG tablet Take 1 tablet by mouth Daily. 90 tablet 0    Omega-3 Fatty Acids (fish oil) 1000 MG capsule capsule Daily.       No current facility-administered medications on file prior to visit.        Past Medical History:   Diagnosis Date    Allergies     eggs, corn, green beans- immunotherapy in childhood    CHASITY (obstructive sleep apnea)     Tobacco chew use     quit in 1985    Wears dentures     upper    Wears glasses       Past Surgical History:   Procedure Laterality Date    CARDIAC CATHETERIZATION  10/2018    CHD, 1 stent placement    CIRCUMCISION  08/1964    COLONOSCOPY  1985    HEEL SPUR SURGERY Left 2002    TONSILLECTOMY AND ADENOIDECTOMY  1981    WISDOM TOOTH EXTRACTION        Family History   Problem Relation Age of Onset    Hypertension Mother     Hyperlipidemia Mother     Liver cancer Mother     Depression Father     Alcohol abuse Father     Hypertension Sister     Diabetes Sister     Hypertension Sister     Diabetes Sister     Liver cancer Sister     No Known Problems Daughter     Heart attack Son     No Known Problems Maternal Grandmother     Stroke Maternal Grandfather     Stroke Paternal Grandmother     No Known Problems Paternal Grandfather       Social History     Socioeconomic History    Marital status:    Tobacco Use    Smoking status: Never     Passive exposure: Never    Smokeless tobacco: Former     Types: Chew     Quit date: 1990   Vaping Use    Vaping Use: Never used   Substance and Sexual Activity    Alcohol use: Not Currently    Drug use: Not Currently    Sexual activity: Defer         Office Visit on 09/06/2023   Component Date Value Ref Range Status    Lyme Total Antibody EIA 09/06/2023 Negative  Negative Final    Lyme antibodies not detected. Reflex testing is  not indicated.  No laboratory evidence of infection with B. burgdorferi (Lyme disease).  Negative results may occur in patients recently infected (less than  or equal to 14 days) with B. burgdorferi.  If recent infection is  suspected, repeat testing on a new sample collected in 7 to 14 days is  recommended.    Rickettsia rickettsii DNA, RT 09/06/2023 Not Detected   Final    REFERENCE RANGE: NOT DETECTED  This test was developed and its analytical performance  characteristics have been determined by [employer].  It has not been cleared or approved by FDA. This assay has  been validated pursuant to the CLIA regulations and is  used for clinical purposes.   Office Visit on 07/25/2023   Component Date Value Ref Range Status    Hemoglobin A1C 07/25/2023 7.70 (H)  4.80 - 5.60 % Final    Glucose 07/25/2023 150 (H)  65 - 99 mg/dL Final    BUN 07/25/2023 8  6 - 20 mg/dL Final    Creatinine 07/25/2023 0.75 (L)  0.76 - 1.27 mg/dL Final    Sodium 07/25/2023 141  136 - 145 mmol/L Final    Potassium 07/25/2023 3.5  3.5 - 5.2 mmol/L Final    Chloride 07/25/2023 100  98 - 107 mmol/L Final    CO2 07/25/2023 25.8  22.0 - 29.0 mmol/L Final    Calcium 07/25/2023 9.7  8.6 - 10.5 mg/dL Final    Total Protein 07/25/2023 6.9  6.0 - 8.5 g/dL Final    Albumin 07/25/2023 4.6  3.5 - 5.2 g/dL Final    ALT (SGPT) 07/25/2023 41  1 - 41 U/L Final    AST (SGOT) 07/25/2023 33  1 - 40 U/L Final    Alkaline Phosphatase 07/25/2023 55  39 - 117 U/L Final    Total Bilirubin 07/25/2023 0.7  0.0 - 1.2 mg/dL Final    Globulin 07/25/2023 2.3  gm/dL Final    A/G Ratio 07/25/2023 2.0  g/dL Final    BUN/Creatinine Ratio 07/25/2023 10.7  7.0 - 25.0 Final    Anion Gap 07/25/2023 15.2 (H)  5.0 - 15.0 mmol/L Final    eGFR 07/25/2023 104.6  >60.0 mL/min/1.73 Final         Physical Exam   Sitting on his right hip. Transfers to standing without difficulty.  Hesitant to transfer from sitting to supine and needs assistance to transfer from supine to sitting.     Normal DTR bilaterally. Sensory intact of the lower extremities  Mild loss of lumbar lordosis.  No tenderness of the dorsal spine or pelvis  Negative right straight leg test, positive on the right at approximately 60 degrees    Result Review  Data Reviewed:{ Labs  Result Review  Imaging  Med Tab  Media :23}                     Assessment and Plan {CC Problem List  Visit Diagnosis  ROS  Review (Popup)  Health Maintenance  Quality  BestPractice  Medications  SmartSets  SnapShot Encounters  Media :23}   Diagnoses and all orders for this visit:    1. Acute left-sided low back pain without sciatica (Primary)  Comments:  MDM: proper body mechanics discussed. Walk to decrease disc bulge. No NSAIDS  Orders:  -     predniSONE (DELTASONE) 20 MG tablet; Take 1 tablet by mouth Daily.  Dispense: 7 tablet; Refill: 0  -     cyclobenzaprine (FLEXERIL) 10 MG tablet; Take 1 tablet by mouth At Night As Needed for Muscle Spasms.  Dispense: 7 tablet; Refill: 0          Follow Up {Instructions Charge Capture  Follow-up Communications :23}     Patient was given instructions and counseling regarding his condition or for health maintenance advice. Please see specific information pulled into the AVS (placed there by myself) if appropriate.    Return if symptoms worsen or fail to improve.  Mirtha Marcos MD

## 2023-09-27 DIAGNOSIS — M54.50 ACUTE LEFT-SIDED LOW BACK PAIN WITHOUT SCIATICA: ICD-10-CM

## 2023-09-27 RX ORDER — PREDNISONE 20 MG/1
20 TABLET ORAL DAILY
Qty: 7 TABLET | Refills: 0 | Status: SHIPPED | OUTPATIENT
Start: 2023-09-27

## 2023-09-27 RX ORDER — CYCLOBENZAPRINE HCL 10 MG
10 TABLET ORAL NIGHTLY PRN
Qty: 7 TABLET | Refills: 0 | Status: SHIPPED | OUTPATIENT
Start: 2023-09-27

## 2023-09-27 NOTE — TELEPHONE ENCOUNTER
Rx Refill Note  Requested Prescriptions     Pending Prescriptions Disp Refills    cyclobenzaprine (FLEXERIL) 10 MG tablet 7 tablet 0     Sig: Take 1 tablet by mouth At Night As Needed for Muscle Spasms.    predniSONE (DELTASONE) 20 MG tablet 7 tablet 0     Sig: Take 1 tablet by mouth Daily.      Last office visit with prescribing clinician: 9/21/2023   Last telemedicine visit with prescribing clinician: Visit date not found   Next office visit with prescribing clinician: 3/6/2024                         Would you like a call back once the refill request has been completed: [] Yes [] No    If the office needs to give you a call back, can they leave a voicemail: [] Yes [] No    Noemi Carreno Rep  09/27/23, 09:40 EDT

## 2023-10-12 DIAGNOSIS — E87.6 HYPOKALEMIA: ICD-10-CM

## 2023-10-12 RX ORDER — POTASSIUM CHLORIDE 20 MEQ/1
TABLET, EXTENDED RELEASE ORAL
Qty: 60 TABLET | Refills: 0 | Status: SHIPPED | OUTPATIENT
Start: 2023-10-12

## 2023-10-12 NOTE — TELEPHONE ENCOUNTER
Rx Refill Note  Requested Prescriptions     Pending Prescriptions Disp Refills    potassium chloride (K-DUR,KLOR-CON) 20 MEQ CR tablet [Pharmacy Med Name: potassium chloride ER 20 mEq tablet,extended release(part/cryst)] 60 tablet 0     Sig: TAKE ONE TABLET BY MOUTH TWICE DAILY      Last office visit with prescribing clinician: 9/21/2023   Last telemedicine visit with prescribing clinician: Visit date not found   Next office visit with prescribing clinician: 3/6/2024                         Would you like a call back once the refill request has been completed: [] Yes [] No    If the office needs to give you a call back, can they leave a voicemail: [] Yes [] No    Terra Zimmer CMA  10/12/23, 11:42 EDT

## 2023-10-18 NOTE — TELEPHONE ENCOUNTER
Rx Refill Note  Requested Prescriptions     Pending Prescriptions Disp Refills    metFORMIN (GLUCOPHAGE) 500 MG tablet [Pharmacy Med Name: metformin 500 mg tablet] 270 tablet 0     Sig: TAKE ONE TABLET BY MOUTH THREE TIMES DAILY WITH MEALS      Last office visit with prescribing clinician: 9/21/2023   Last telemedicine visit with prescribing clinician: Visit date not found   Next office visit with prescribing clinician: 3/6/2024     Hemoglobin A1c (07/25/2023 03:00)       Would you like a call back once the refill request has been completed: [] Yes [] No    If the office needs to give you a call back, can they leave a voicemail: [] Yes [] No    Terra Zimmer CMA  10/18/23, 13:48 EDT

## 2023-10-23 ENCOUNTER — TREATMENT (OUTPATIENT)
Dept: PHYSICAL THERAPY | Facility: CLINIC | Age: 58
End: 2023-10-23
Payer: COMMERCIAL

## 2023-10-23 DIAGNOSIS — M54.50 ACUTE LEFT-SIDED LOW BACK PAIN, UNSPECIFIED WHETHER SCIATICA PRESENT: Primary | ICD-10-CM

## 2023-10-23 DIAGNOSIS — R20.2 NUMBNESS AND TINGLING OF LEFT LEG: ICD-10-CM

## 2023-10-23 DIAGNOSIS — M53.3 SI (SACROILIAC) JOINT DYSFUNCTION: ICD-10-CM

## 2023-10-23 DIAGNOSIS — R20.0 NUMBNESS AND TINGLING OF LEFT LEG: ICD-10-CM

## 2023-10-23 PROCEDURE — 97110 THERAPEUTIC EXERCISES: CPT | Performed by: PHYSICAL THERAPIST

## 2023-10-23 PROCEDURE — 97162 PT EVAL MOD COMPLEX 30 MIN: CPT | Performed by: PHYSICAL THERAPIST

## 2023-10-23 NOTE — PROGRESS NOTES
Physical Therapy Initial Evaluation and Plan of Care  [address] [address]  [place] [address]    Patient: Gustavo Singh   : 1965  Diagnosis/ICD-10 Code:  Acute left-sided low back pain, unspecified whether sciatica present [M54.50]  Referring practitioner: TAY Andrea  Date of Initial Visit: 10/23/2023  Today's Date: 10/23/2023  Patient seen for 1 sessions           Visit Diagnoses:     ICD-10-CM ICD-9-CM   1. Acute left-sided low back pain, unspecified whether sciatica present  M54.50 724.2   2. Numbness and tingling of left leg  R20.0 782.0    R20.2    3. SI (sacroiliac) joint dysfunction  M53.3 724.6       Subjective Questionnaire: Oswestry: 21 = 42% limited; LEFS 33/80 = 41.25% ability/58.75% limited    Subjective Evaluation    History of Present Illness  Mechanism of injury: Pt was working in his garage on 23 and leaned over to  a wooden box leading to L sided LBP/pulling. Pt tried heating pad, patch and IBU with some relief. Pt was given Prednisone (was taking taper dose from urgent care and 10 mg from PCP; no longer taking) & Cyclobenzaprine at 1 tab at night prn. Pt is no longer taking that.     Pt has tweaked his back 2 times in the past since he retired.     Pt states pain was in the back and into the L buttock with numbness/tingling which progressed down the leg and now is more at the post thigh and some at the lateral L foot. Pt states the numbness was the whole foot, but now only lateral border.  Pt does report he has neuropathy so always had some numbness. Pt does not have c/o bowel/bladder dysfunction.    PMH: allergic to Penicillin, Lisinopril, Tenoretic, eggs, corn, green beans (immunotherapy in childhood), CHASITY, tobacco chew (quit ), wears glasses, wears upper dentures, DMII, hypokalemia, diabetic polyneuropathy assoc with DM due to underlying condition, benign HTN, atherosclerosis of native coronary artery of native heart without angina  pectoris, mixed hyperlipidemia    PSH: cardiac cath CHD 1 stent placement 10/2018, circumcision 1964, colonoscopy , heel spur surg L , T&A , wisdom tooth extraction    Denies hx: pacemaker, metal implants, CA, CVA, seizures, MI, latex allergies    Pain: 5/10 current, 2/10 at best, 8-9/10 at worst initially, 5 at worst in the last week    Aggravating/functional factors: sitting, walking - limps & tight ms, squatting, lifting, carrying, stairs goes slow, sleeping, mowing, driving    PLOF: some prior issues with the above functional activities since     Relieving factors: IBU, heating pad    Social Hx: lives with spouse; 2 steps to get in the house w/rail; retired; does wood working; enjoys walking      Quality of life: fair/good due to this.    Pain  Quality: pressure, dull ache and radiating  Symptom course: some improvement, not all better.    Hand dominance: right    Treatments  Previous treatment: physical therapy  Patient Goals  Patient goals for therapy: decreased pain, improved balance, increased strength, independence with ADLs/IADLs, return to sport/leisure activities and increased motion  Patient goal: return to PLOF         Objective          Active Range of Motion     Lumbar   Flexion: 50 degrees   Extension: 10 degrees   Left Hip   External rotation (90/90): 25 degrees   Internal rotation (90/90): 30 degrees     Right Hip   External rotation (90/90): 25 degrees   Internal rotation (90/90): 33 degrees   Left Knee   Extensor la degrees with pain    Right Knee   Extensor la degrees     Additional Active Range of Motion Details  Reps flex no change LB, pulling in L post thigh  Reps ext increased pain down L post thigh  Pain at L buttock with leaning to move R hip IR/ER    Strength/Myotome Testing     Left Hip   Planes of Motion   Flexion: 4  Extension: 4-  External rotation: 4+  Internal rotation: 4+    Right Hip   Planes of Motion   Flexion: 4  Extension: 4-  External rotation:  4+  Internal rotation: 4+    Left Knee   Flexion: 4+  Extension: 4+    Right Knee   Flexion: 4+  Extension: 4+    Left Ankle/Foot   Dorsiflexion: 4+  Inversion: 5  Eversion: 5  Great toe extension: 4+    Right Ankle/Foot   Dorsiflexion: 4+  Inversion: 5  Eversion: 5  Great toe extension: 4+        Observation: seated with wt shifted R    Palpation: TTP @ L/S region; R outflare; L PINN; uni ext R, L on L     Sensation: intact/equal to LT B LE except diminished L foot vs R due to neuropathy    Posture: head fwd/rounded shoulders; swayback posture in standing    DTRs: 2+ B LE     Clonus: (-)     Gait: I without AD; reduced gt speed, reduced step length    Balance: SLS 3 s L/18 s R on level ground without UE support & SBA    Transfers: I supine to/from sit with fair body mechanics; I sit to/from stand with UE prn      Assessment & Plan       Assessment  Impairments: abnormal coordination, abnormal gait, abnormal muscle firing, abnormal muscle tone, abnormal or restricted ROM, activity intolerance, impaired balance, impaired physical strength, lacks appropriate home exercise program, pain with function, safety issue and weight-bearing intolerance   Assessment details: The patient is a 58 y.o. male who presents to physical therapy today for acute low back pain unspecified whether sciatica present, n/t of L leg, and SI dysfunction. Upon initial evaluation, the patient demonstrates the above & following impairments: pain, reduced posture, SI/IS dysfunction, decreased ROM/flexibility, strength, gait, balance and function. Due to these impairments, the patient is unable to/limited with: sitting, walking - limps & tight ms, squatting, lifting, carrying, stairs goes slow, sleeping, mowing, and driving. The patient would benefit from skilled PT services to address functional limitations and impairments and to improve patient quality of life.      Barriers to therapy: DMII and HTN could affect PT Rx/progress/outcomes if  exacerbated/unregulated which could affect tolerance to PT/exs or DMII could delay healing  Prognosis: good    Goals  Plan Goals: STGs in 4 weeks:  Decrease pain to 6/10 on average  Increase trunk/LE ROM by 10 degrees where limited as much  Increase B hip ext strength to 4/5  Improve pelvic/sacral alignment    LTGs by discharge  Increase trunk/LE ROM to WFL/WNL  Increase LE strength to 5/5   Pt will be able to sit/drive/ride car or mower for 30-60 mins without difficulty or pain  Pt will be able to stand 30-60 mins for basic ADLs without difficulty or pain  Pt will be able to walk 30-60 mins for grocery shopping without difficulty, pain or LOB  Pt will be able to lift/carry laundry baskets, pots/pans, garbage or grocery bags without difficulty or increased pain  Pt will be able to sleep full nights most nights without waking from LBP/LE symptoms      Plan  Therapy options: will be seen for skilled therapy services  Planned modality interventions: cryotherapy, thermotherapy (hydrocollator packs), electrical stimulation/Colombian stimulation, ultrasound, traction and dry needling  Planned therapy interventions: manual therapy, neuromuscular re-education, postural training, soft tissue mobilization, spinal/joint mobilization, strengthening, stretching, therapeutic activities, transfer training, abdominal trunk stabilization, ADL retraining, body mechanics training, home exercise program, gait training, functional ROM exercises, flexibility, motor coordination training, balance/weight-bearing training and joint mobilization  Frequency: 3x week  Duration in weeks: 13  Treatment plan discussed with: patient        History # of Personal Factors and/or Comorbidities: MODERATE (1-2)  Examination of Body System(s): # of elements: HIGH (4+)  Clinical Presentation: EVOLVING   Clinical Decision Making: MODERATE      Timed:         Manual Therapy:   6      mins  74239;     Therapeutic Exercise:   13      mins  33831;      Neuromuscular Mary:        mins  28116;    Therapeutic Activity:          mins  00190;     Gait Training:           mins  37846;     Ultrasound:          mins  48846;    Ionto                                   mins   20235  Self Care                            mins   74157  Canalith Repos         mins 30487      Un-Timed:  Electrical Stimulation:         mins  98187 ( );  Dry Needling          mins self-pay  Traction          mins 83863  Low Eval          Mins  95279  Mod Eval    41      Mins  69044  High Eval                            Mins  79296  Re-Eval                               mins  71888        Timed Treatment:   19   mins   Total Treatment:     60   mins            PT SIGNATURE: Anaya Newell, NOEMÍ   [place] PT Lic# [id number]  DATE TREATMENT INITIATED: 10/23/2023    Initial Certification  Certification Period: 10/23/2023 through 1/20/2024  I certify that the therapy services are furnished while this patient is under my care.  The services outlined above are required by this patient, and will be reviewed every 90 days.         Physician Signature: _________________________  PHYSICIAN: Ashley Barrios APRN   NPI: [id number]                                             DATE: _____________________________________    Please sign and return via fax to [phone]. Thank you, [facility].

## 2023-10-25 ENCOUNTER — TREATMENT (OUTPATIENT)
Dept: PHYSICAL THERAPY | Facility: CLINIC | Age: 58
End: 2023-10-25
Payer: COMMERCIAL

## 2023-10-25 DIAGNOSIS — M53.3 SI (SACROILIAC) JOINT DYSFUNCTION: ICD-10-CM

## 2023-10-25 DIAGNOSIS — R20.0 NUMBNESS AND TINGLING OF LEFT LEG: ICD-10-CM

## 2023-10-25 DIAGNOSIS — R20.2 NUMBNESS AND TINGLING OF LEFT LEG: ICD-10-CM

## 2023-10-25 DIAGNOSIS — M54.50 ACUTE LEFT-SIDED LOW BACK PAIN, UNSPECIFIED WHETHER SCIATICA PRESENT: Primary | ICD-10-CM

## 2023-10-25 NOTE — PROGRESS NOTES
Physical Therapy Treatment Note  [address] [address]  [place] [address]      VISIT#: 2    Subjective   Gustavo Singh reports: trouble going to sleep and notes he pulls his leg over across his body when he sleeps. Pt feels like it hurts the leg. Pt feels better with pressure laying on that hip versus laying on the opposite side. Sitting also aggravates.  Pt is using heat at home.       Objective     See Exercise, Manual, and Modality Logs for complete treatment.     Patient Education: cues for therex    Assessment/Plan Pt felt increased pain at the back of the L LE with ball rolls. Mat activities were more well tolerated and progressed per flowsheet. Manual was fairly well tolerated, but no significant change in pain, although not painful with H/L after seated ball roll.       Progress per Plan of Care and Progress strengthening /stabilization /functional activity            Timed:         Manual Therapy:   10      mins  36494;     Therapeutic Exercise:   14     mins  70506;     Neuromuscular Mary:   14     mins  33742;    Therapeutic Activity:          mins  96882;     Gait Training:           mins  07519;     Ultrasound:          mins  04081;    Ionto                                   mins   43622  Self Care                            mins   26868    Un-Timed:  Electrical Stimulation:         mins  78434 ( );  Traction          mins 57304  Canalith Repos                   mins  89002  Dry Needle 1-2 ms      ___  mins 88617  Dry Needle  3+ ms              mins 86948  Low Eval          mins  79826  Mod Eval          Mins  52643  High Eval                            Mins  13235  Re-Eval                               mins  13125    Timed Treatment:   38   mins   Total Treatment:      38  mins          Anaya Newell, PT    Physical Therapist

## 2023-10-31 ENCOUNTER — TREATMENT (OUTPATIENT)
Dept: PHYSICAL THERAPY | Facility: CLINIC | Age: 58
End: 2023-10-31
Payer: COMMERCIAL

## 2023-10-31 DIAGNOSIS — R20.2 NUMBNESS AND TINGLING OF LEFT LEG: ICD-10-CM

## 2023-10-31 DIAGNOSIS — R20.0 NUMBNESS AND TINGLING OF LEFT LEG: ICD-10-CM

## 2023-10-31 DIAGNOSIS — M53.3 SI (SACROILIAC) JOINT DYSFUNCTION: ICD-10-CM

## 2023-10-31 DIAGNOSIS — M54.50 ACUTE LEFT-SIDED LOW BACK PAIN, UNSPECIFIED WHETHER SCIATICA PRESENT: Primary | ICD-10-CM

## 2023-10-31 NOTE — PROGRESS NOTES
Physical Therapy Treatment Note  [address] [address]  [place] [address]      VISIT#: 3    Subjective   Gustavo Singh reports: he's tight today at the back of the L LE after going to [employer] game with taking the stairs on Sunday. Pt only did his exs 1x after that due to all the stairs. The back can be tender if he picks something heavy up. Pt does note he feels better when he leaves here each time.     Pt states it's like a 4/10 and has gotten a little better with moving around. Mornings are worse and end of day seems to get better. Pt does feel IASTM really helped.       Objective     See Exercise, Manual, and Modality Logs for complete treatment.     Patient Education: cues for therex form/positioning, limit range prn; NMR cues for avoiding compensation & maintaining abd hold    Assessment/Plan  Pt tolerated session well with progressions per flow sheet. Continues to require cues for stabilization/form especially with BKFO uni to avoid low trunk rot.     Progress per Plan of Care and Progress strengthening /stabilization /functional activity            Timed:         Manual Therapy:   10      mins  64376;     Therapeutic Exercise:   20      mins  08234;     Neuromuscular Mary:  23     mins  54883;    Therapeutic Activity:          mins  71074;     Gait Training:           mins  03074;     Ultrasound:          mins  90436;    Ionto                                   mins   41485  Self Care                            mins   40826    Un-Timed:  Electrical Stimulation:         mins  71869 ( );  Traction          mins 80147  Canalith Repos                   mins  99776  Dry Needle 1-2 ms      ___  mins 93473  Dry Needle  3+ ms              mins 37808  Low Eval          mins  16152  Mod Eval          Mins  27641  High Eval                            Mins  20610  Re-Eval                               mins  18725    Timed Treatment:  53    mins   Total Treatment:    53    mins          Anaya  Reece, PT    Physical Therapist

## 2023-11-06 ENCOUNTER — TREATMENT (OUTPATIENT)
Dept: PHYSICAL THERAPY | Facility: CLINIC | Age: 58
End: 2023-11-06
Payer: COMMERCIAL

## 2023-11-06 DIAGNOSIS — M54.50 ACUTE LEFT-SIDED LOW BACK PAIN, UNSPECIFIED WHETHER SCIATICA PRESENT: Primary | ICD-10-CM

## 2023-11-06 DIAGNOSIS — R20.0 NUMBNESS AND TINGLING OF LEFT LEG: ICD-10-CM

## 2023-11-06 DIAGNOSIS — E87.6 HYPOKALEMIA: ICD-10-CM

## 2023-11-06 DIAGNOSIS — M53.3 SI (SACROILIAC) JOINT DYSFUNCTION: ICD-10-CM

## 2023-11-06 DIAGNOSIS — R20.2 NUMBNESS AND TINGLING OF LEFT LEG: ICD-10-CM

## 2023-11-06 DIAGNOSIS — E11.9 TYPE 2 DIABETES MELLITUS WITHOUT COMPLICATION, WITHOUT LONG-TERM CURRENT USE OF INSULIN: ICD-10-CM

## 2023-11-06 NOTE — TELEPHONE ENCOUNTER
NO LIPID IN LAST 12 MONTHS    Rx Refill Note  Requested Prescriptions     Pending Prescriptions Disp Refills    potassium chloride (K-DUR,KLOR-CON) 20 MEQ CR tablet [Pharmacy Med Name: potassium chloride ER 20 mEq tablet,extended release(part/cryst)] 60 tablet 0     Sig: TAKE ONE TABLET BY MOUTH TWICE DAILY    Alogliptin Benzoate 25 MG tablet [Pharmacy Med Name: alogliptin 25 mg tablet] 90 tablet 0     Sig: TAKE ONE TABLET BY MOUTH EVERY DAY      Last office visit with prescribing clinician: 9/21/2023   Last telemedicine visit with prescribing clinician: Visit date not found   Next office visit with prescribing clinician: 3/6/2024     Lipid panel (10/20/2022 13:56)       Would you like a call back once the refill request has been completed: [] Yes [] No    If the office needs to give you a call back, can they leave a voicemail: [] Yes [] No    Terra Zimmer, ALBA  11/06/23, 10:36 EST

## 2023-11-06 NOTE — PROGRESS NOTES
Physical Therapy Daily Treatment Note      [facility] [place]              [address], [address]                [place]  [address]        Patient: Gustavo Singh   : 1965  Diagnosis/ICD-10 Code:  Acute left-sided low back pain, unspecified whether sciatica present [M54.50]  Referring practitioner: TAY Andrea  Date of Initial Visit: Type: THERAPY  Noted: 10/23/2023  Today's Date: 2023  Patient seen for 4 sessions         Subjective    Gustavo Singh reports: no back pain.  He said he has some numbness down the back of his leg.  He has had some cramping in his legs the last week or two.  He thinks the IASTM helped but isn't sure about the pulling on his leg.            Objective   See Exercise, Manual, and Modality Logs for complete treatment.     Tenderness in piriformis, Tightness in lumbar paraspinals    Assessment/Plan  Maintained exercises and continued with manual as pt reported some relief.  Added gentle hip mobs as pt with greatest complaint of numbness in this area.  No change in symptoms with manual traction thus trialed mechanical.  No change following however started with conservative weight thus may trial 1-2 more sessions based on tolerance following.     Progress per Plan of Care           Timed:  Manual Therapy:    10     mins  88404;  Therapeutic Exercise:    25     mins  41800;     Neuromuscular Mary:        mins  09873;    Therapeutic Activity:          mins  17267;     Gait Training:           mins  87819;     Ultrasound:          mins  81789;     Work Conditioning/Hardening (initial 2 hours)        mins  38481  Work Conditioning/Hardening (each add'l hour)        mins  15151    Untimed:   Electrical Stimulation:         mins  54199 ( );  Traction     10     mins 36654    Timed Treatment:   45   mins   Total Treatment:     45   mins    Tarsha Cho PTA  Physical Therapist Assistant

## 2023-11-07 RX ORDER — POTASSIUM CHLORIDE 20 MEQ/1
TABLET, EXTENDED RELEASE ORAL
Qty: 60 TABLET | Refills: 0 | Status: SHIPPED | OUTPATIENT
Start: 2023-11-07

## 2023-11-07 RX ORDER — ALOGLIPTIN 25 MG/1
TABLET, FILM COATED ORAL
Qty: 90 TABLET | Refills: 0 | Status: SHIPPED | OUTPATIENT
Start: 2023-11-07

## 2023-11-09 ENCOUNTER — TREATMENT (OUTPATIENT)
Dept: PHYSICAL THERAPY | Facility: CLINIC | Age: 58
End: 2023-11-09
Payer: COMMERCIAL

## 2023-11-09 DIAGNOSIS — M54.50 ACUTE LEFT-SIDED LOW BACK PAIN, UNSPECIFIED WHETHER SCIATICA PRESENT: Primary | ICD-10-CM

## 2023-11-09 DIAGNOSIS — R20.2 NUMBNESS AND TINGLING OF LEFT LEG: ICD-10-CM

## 2023-11-09 DIAGNOSIS — R20.0 NUMBNESS AND TINGLING OF LEFT LEG: ICD-10-CM

## 2023-11-09 DIAGNOSIS — M53.3 SI (SACROILIAC) JOINT DYSFUNCTION: ICD-10-CM

## 2023-11-09 NOTE — PROGRESS NOTES
Physical Therapy Daily Treatment Note      [facility] [place]              [address], [address]                [facility] [place]  [address]        Patient: Gustavo Singh   : 1965  Diagnosis/ICD-10 Code:  Acute left-sided low back pain, unspecified whether sciatica present [M54.50]  Referring practitioner: TAY Andrea  Date of Initial Visit: Type: THERAPY  Noted: 10/23/2023  Today's Date: 2023  Patient seen for 5 sessions         Subjective    Gustavo Singh reports: he still doesn't have back pain.  The numbness is about the same as last time.           Objective   See Exercise, Manual, and Modality Logs for complete treatment.     Tenderness to piriformis and Tightness in TFL    Assessment/Plan  Continued with gentle strengthening and stretching program with focus on decreased neurological symptoms.   Progressed weight and time on traction without complaints and report of increased stretch following.  Continued tenderness and tightness as noted which was addressed with IASTM.  Will continue to monitor numbness with traction and will hold if it does not improve.     Progress per Plan of Care           Timed:  Manual Therapy:    8     mins  06035;  Therapeutic Exercise:    25     mins  19743;     Neuromuscular Mary:        mins  41200;    Therapeutic Activity:          mins  55457;     Gait Training:           mins  02374;     Ultrasound:          mins  56995;     Work Conditioning/Hardening (initial 2 hours)        mins  23598  Work Conditioning/Hardening (each add'l hour)        mins  11896    Untimed:   Electrical Stimulation:         mins  26397 ( );  Traction     15     mins 71863    Timed Treatment:   33   mins   Total Treatment:     48   mins    Tarsha Cho PTA  Physical Therapist Assistant

## 2023-11-14 DIAGNOSIS — I10 BENIGN HYPERTENSION: ICD-10-CM

## 2023-11-14 RX ORDER — HYDROCHLOROTHIAZIDE 25 MG/1
TABLET ORAL
Qty: 90 TABLET | Refills: 0 | Status: SHIPPED | OUTPATIENT
Start: 2023-11-14

## 2023-11-14 NOTE — TELEPHONE ENCOUNTER
Rx Refill Note  Requested Prescriptions     Pending Prescriptions Disp Refills    hydroCHLOROthiazide (HYDRODIURIL) 25 MG tablet [Pharmacy Med Name: hydrochlorothiazide 25 mg tablet] 90 tablet 0     Sig: TAKE ONE TABLET BY MOUTH EVERY MORNING      Last office visit with prescribing clinician: 9/21/2023   Last telemedicine visit with prescribing clinician: Visit date not found   Next office visit with prescribing clinician: 3/6/2024                         Would you like a call back once the refill request has been completed: [] Yes [] No    If the office needs to give you a call back, can they leave a voicemail: [] Yes [] No    Terra Zimmer, [facility]  11/14/23, 12:44 EST

## 2023-11-16 ENCOUNTER — TREATMENT (OUTPATIENT)
Dept: PHYSICAL THERAPY | Facility: CLINIC | Age: 58
End: 2023-11-16
Payer: COMMERCIAL

## 2023-11-16 DIAGNOSIS — M53.3 SI (SACROILIAC) JOINT DYSFUNCTION: ICD-10-CM

## 2023-11-16 DIAGNOSIS — R20.0 NUMBNESS AND TINGLING OF LEFT LEG: ICD-10-CM

## 2023-11-16 DIAGNOSIS — R20.2 NUMBNESS AND TINGLING OF LEFT LEG: ICD-10-CM

## 2023-11-16 DIAGNOSIS — M54.50 ACUTE LEFT-SIDED LOW BACK PAIN, UNSPECIFIED WHETHER SCIATICA PRESENT: Primary | ICD-10-CM

## 2023-11-16 PROCEDURE — 97012 MECHANICAL TRACTION THERAPY: CPT | Performed by: PHYSICAL THERAPIST

## 2023-11-16 PROCEDURE — 97530 THERAPEUTIC ACTIVITIES: CPT | Performed by: PHYSICAL THERAPIST

## 2023-11-16 PROCEDURE — 97110 THERAPEUTIC EXERCISES: CPT | Performed by: PHYSICAL THERAPIST

## 2023-11-16 NOTE — PROGRESS NOTES
Physical Therapy Daily Treatment Note    Patient: Gustavo Singh  : 1965  Referring practitioner: TAY Andrea  Today's Date: 2023    VISIT#: 6      Subjective   Gustavo Singh reports: Doing ok, feeling a little better overall, symptoms are as intense, but still gets some discomfort and numbness down the back of his left thigh.       Objective     See Exercise, Manual, and Modality Logs for complete treatment.     Patient Education: continue HEP    Assessment/Plan  Good response to session, no increased pain throughout. Continued mechanical traction due to good response from previous session. Increased weight to 80 lbs. Continues to have tightness in left hip and lumbar spine.     Progress per Plan of Care            Timed:         Manual Therapy:         mins  60291;     Therapeutic Exercise:    30     mins  26771;     Neuromuscular Mary:        mins  56153;    Therapeutic Activity:     15     mins  49166;     Gait Training:           mins  38176;     Ultrasound:          mins  80645;    Ionto:                                   mins   17878  Self Care:                            mins   04360    Un-Timed:  Electrical Stimulation:         mins  10309 ( );  Dry Needling          mins self-pay  Traction     15     mins 42169  Re-Eval                               mins  06901    Timed Treatment:   45   mins   Total Treatment:     60   mins    Adriana Adams, PT  Physical Therapist  [place] PT license #: [id number]  [place] PT license #: [id number]

## 2023-11-20 ENCOUNTER — TREATMENT (OUTPATIENT)
Dept: PHYSICAL THERAPY | Facility: CLINIC | Age: 58
End: 2023-11-20
Payer: COMMERCIAL

## 2023-11-20 DIAGNOSIS — R20.0 NUMBNESS AND TINGLING OF LEFT LEG: ICD-10-CM

## 2023-11-20 DIAGNOSIS — R20.2 NUMBNESS AND TINGLING OF LEFT LEG: ICD-10-CM

## 2023-11-20 DIAGNOSIS — M54.50 ACUTE LEFT-SIDED LOW BACK PAIN, UNSPECIFIED WHETHER SCIATICA PRESENT: Primary | ICD-10-CM

## 2023-11-20 DIAGNOSIS — M53.3 SI (SACROILIAC) JOINT DYSFUNCTION: ICD-10-CM

## 2023-11-20 PROCEDURE — 97012 MECHANICAL TRACTION THERAPY: CPT | Performed by: PHYSICAL THERAPIST

## 2023-11-20 PROCEDURE — 97110 THERAPEUTIC EXERCISES: CPT | Performed by: PHYSICAL THERAPIST

## 2023-11-20 PROCEDURE — 97530 THERAPEUTIC ACTIVITIES: CPT | Performed by: PHYSICAL THERAPIST

## 2023-11-20 NOTE — PROGRESS NOTES
Physical Therapy Daily Treatment Note      [facility] [place]              [address], [address]                [facility] [place]  [address]        Patient: Gustavo Singh   : 1965  Diagnosis/ICD-10 Code:  Acute left-sided low back pain, unspecified whether sciatica present [M54.50]  Referring practitioner: TAY Andrea  Date of Initial Visit: Type: THERAPY  Noted: 10/23/2023  Today's Date: 2023  Patient seen for 7 sessions         Subjective    Gustavo Singh reports: he still has the numbness and it is about the same as it was last session.  He said he was a little sore after the traction but it felt good so he wants to keep the weight the same.             Objective   See Exercise, Manual, and Modality Logs for complete treatment.       Assessment/Plan  Increased functional strengthening as pt reported no increased pain from exercises.  He demonstrated good core contractions with supine and standing exercises.  Maintained traction per pt subjective.  Will monitor tolerance and progress as able.       Progress per Plan of Care           Timed:  Manual Therapy:         mins  71426;  Therapeutic Exercise:    30     mins  62519;     Neuromuscular Mary:        mins  51083;    Therapeutic Activity:     8     mins  09876;     Gait Training:           mins  74146;     Ultrasound:          mins  36373;     Work Conditioning/Hardening (initial 2 hours)        mins  36229  Work Conditioning/Hardening (each add'l hour)        mins  77860    Untimed:   Electrical Stimulation:         mins  53916 ( );  Traction     15     mins 71634    Timed Treatment:   38   mins   Total Treatment:     53   mins    Tarsha Cho PTA  Physical Therapist Assistant

## 2023-11-22 ENCOUNTER — TREATMENT (OUTPATIENT)
Dept: PHYSICAL THERAPY | Facility: CLINIC | Age: 58
End: 2023-11-22
Payer: COMMERCIAL

## 2023-11-22 DIAGNOSIS — M53.3 SI (SACROILIAC) JOINT DYSFUNCTION: ICD-10-CM

## 2023-11-22 DIAGNOSIS — R20.2 NUMBNESS AND TINGLING OF LEFT LEG: ICD-10-CM

## 2023-11-22 DIAGNOSIS — M54.50 ACUTE LEFT-SIDED LOW BACK PAIN, UNSPECIFIED WHETHER SCIATICA PRESENT: Primary | ICD-10-CM

## 2023-11-22 DIAGNOSIS — R20.0 NUMBNESS AND TINGLING OF LEFT LEG: ICD-10-CM

## 2023-11-22 PROCEDURE — 97140 MANUAL THERAPY 1/> REGIONS: CPT | Performed by: PHYSICAL THERAPIST

## 2023-11-22 PROCEDURE — 97530 THERAPEUTIC ACTIVITIES: CPT | Performed by: PHYSICAL THERAPIST

## 2023-11-22 PROCEDURE — 97110 THERAPEUTIC EXERCISES: CPT | Performed by: PHYSICAL THERAPIST

## 2023-11-22 PROCEDURE — 97012 MECHANICAL TRACTION THERAPY: CPT | Performed by: PHYSICAL THERAPIST

## 2023-11-22 NOTE — PROGRESS NOTES
Physical Therapy 30-Day Progress and  Treatment Note  [address] [address] [address]  [place] [address]      Patient: Gustavo Singh  : 1965  Referring Practitioner: TAY Andrea  Date of Initial Visit: Type: THERAPY  Noted: 10/23/2023  Today's Date: 2023  Patient seen for: 8 sessions      Visit Diagnoses:     ICD-10-CM ICD-9-CM   1. Acute left-sided low back pain, unspecified whether sciatica present  M54.50 724.2   2. Numbness and tingling of left leg  R20.0 782.0    R20.2    3. SI (sacroiliac) joint dysfunction  M53.3 724.6       Subjective   Gustavo Singh reports: no lumbar pain.  States the biggest issue is with a numbness in the left buttock area and with cramping in the left calf.  Increases with walking on unlevel surfaces and or up inclines.   Walking within the home does not hurt.     Pain levels 1-5/10   ( eval 2-9/10 )  Oswestry score:    (eval 21 = 42% limited; LEFS score: ( eval 33/80 = 41.25% ability/58.75% limited)      Objective   Trunk ROM:  flex 50 with jessica ham stretch. Ext 8 wo pain,  BSB 25 with slight pain L  B rotation 30 wo pain.  SLS wo pain  (-) slump and SLR   Hip MMT:  4/5 jessica wo pain.   See Exercise, Manual, and Modality Logs for complete treatment.     Patient Education: add hamstring stretch to home ex.       Assessment/Plan  Gustavo has been to PT x's 8 sessions, including the 10/23/2023 eval date.  He has achieved the STGs and reports less intense pain in the left; however, he continues to report N into the posterior hip/leg.  He reports independent in home ex.      : STGs in 4 weeks:  1)   Decrease pain to 6/10 on average  Increase trunk/LE ROM by 10 degrees where limited as much  2)   Increase B hip ext strength to 4/5  Improve pelvic/sacral alignment     LTGs by discharge  1)   Increase trunk/LE ROM to WFL/WNL  2)   Increase LE strength to 5/5   3)   Pt will be able to sit/drive/ride car or mower for 30-60 mins without difficulty or pain  4)   Pt  will be able to stand 30-60 mins for basic ADLs without difficulty or pain  5)   Pt will be able to walk 30-60 mins for grocery shopping without difficulty, pain or LOB  6)   Pt will be able to lift/carry laundry baskets, pots/pans, garbage or grocery bags without difficulty or increased pain  7)   Pt will be able to sleep full nights most nights without waking from LBP/LE symptoms      Progress per Plan of Care  cotn with PT 2x per week over the next 2 weeks and assess freq at that time pending progress.  Otherwise cont with PT x's 4 weeks then reassess.           Timed:         Manual Therapy:    11     mins  17054;     Therapeutic Exercise:    16    mins  78596;     Neuromuscular Mary:        mins  81009;    Therapeutic Activity:     12     mins  50614;     Gait Training:           mins  26230;     Ultrasound:          mins  95615;    Ionto                                   mins   35970  Self Care                            mins   15559      Un-Timed:  Electrical Stimulation:         mins  59292 ( );  Traction    15      mins 69221        Timed Treatment:   39   mins   Total Treatment:     54   mins              Skylar Mukherjee, PT    Physical Therapist

## 2023-11-28 ENCOUNTER — TREATMENT (OUTPATIENT)
Dept: PHYSICAL THERAPY | Facility: CLINIC | Age: 58
End: 2023-11-28
Payer: COMMERCIAL

## 2023-11-28 DIAGNOSIS — R20.0 NUMBNESS AND TINGLING OF LEFT LEG: ICD-10-CM

## 2023-11-28 DIAGNOSIS — M54.50 ACUTE LEFT-SIDED LOW BACK PAIN, UNSPECIFIED WHETHER SCIATICA PRESENT: Primary | ICD-10-CM

## 2023-11-28 DIAGNOSIS — M53.3 SI (SACROILIAC) JOINT DYSFUNCTION: ICD-10-CM

## 2023-11-28 DIAGNOSIS — R20.2 NUMBNESS AND TINGLING OF LEFT LEG: ICD-10-CM

## 2023-11-28 NOTE — PROGRESS NOTES
Physical Therapy Daily Treatment Note  [address] [address] [address]  [place] [address]      Patient: Gustavo Singh  : 1965  Referring Practitioner: TAY Andrea  Date of Initial Visit: Type: THERAPY  Noted: 10/23/2023  Today's Date: 2023  Patient seen for: 9 sessions      Visit Diagnoses:     ICD-10-CM ICD-9-CM   1. Acute left-sided low back pain, unspecified whether sciatica present  M54.50 724.2   2. Numbness and tingling of left leg  R20.0 782.0    R20.2    3. SI (sacroiliac) joint dysfunction  M53.3 724.6       Subjective   Gustavo Singh reports that he can tell a difference.   Noticed that he was walking more and wo a limp.      Objective   PIVM: 1/3 jessica lumbar   Increase of core ex.   See Exercise, Manual, and Modality Logs for complete treatment.     Patient Education: cont with HEP as able over this week as time allows due to family event.      Assessment/Plan  Mr. Singh is reporting less symptoms as noted during his gait.  Passive motion with slight increased availability.  Traction # increased from 80 to 88# during today's session.     Progress per Plan of Care          Timed:         Manual Therapy:    12     mins  89244;     Therapeutic Exercise:    13     mins  17585;     Neuromuscular Mary:     16   mins  57718;    Therapeutic Activity:          mins  92988;     Gait Training:           mins  31448;     Ultrasound:          mins  71956;    Ionto                                   mins   86303  Self Care                            mins   90712      Un-Timed:  Electrical Stimulation:         mins  74072 ( );  Traction     15     mins 66497    Timed Treatment:   41   mins   Total Treatment:     56   mins              Skylar Mukherjee PT    Physical Therapist

## 2023-11-29 DIAGNOSIS — E78.2 MIXED HYPERLIPIDEMIA: ICD-10-CM

## 2023-11-29 DIAGNOSIS — E78.1 HYPERTRIGLYCERIDEMIA: ICD-10-CM

## 2023-11-29 DIAGNOSIS — E08.42 DIABETIC POLYNEUROPATHY ASSOCIATED WITH DIABETES MELLITUS DUE TO UNDERLYING CONDITION: ICD-10-CM

## 2023-11-29 RX ORDER — GABAPENTIN 400 MG/1
CAPSULE ORAL
Qty: 180 CAPSULE | Refills: 0 | OUTPATIENT
Start: 2023-11-29

## 2023-11-29 NOTE — TELEPHONE ENCOUNTER
Refill request for Gabapentin denied. Refill too soon. Medication was sent to pharmacy on 08.03.2023 with 1 additional refill.    Rx Refill Note  Requested Prescriptions     Pending Prescriptions Disp Refills    omega-3 acid ethyl esters (LOVAZA) 1 g capsule [Pharmacy Med Name: omega-3 acid ethyl esters 1 gram capsule] 180 capsule 0     Sig: TAKE TWO CAPSULES BY MOUTH WITH SUPPER    rosuvastatin (CRESTOR) 40 MG tablet [Pharmacy Med Name: rosuvastatin 40 mg tablet] 90 tablet 0     Sig: TAKE ONE TABLET BY MOUTH EVERY DAY     Refused Prescriptions Disp Refills    gabapentin (NEURONTIN) 400 MG capsule [Pharmacy Med Name: gabapentin 400 mg capsule] 180 capsule 0     Sig: TAKE ONE CAPSULE BY MOUTH TWICE DAILY      Last office visit with prescribing clinician: 9/21/2023   Last telemedicine visit with prescribing clinician: Visit date not found   Next office visit with prescribing clinician: 3/6/2024     Lipid panel (10/20/2022 13:56)     Would you like a call back once the refill request has been completed: [] Yes [] No    If the office needs to give you a call back, can they leave a voicemail: [] Yes [] No    Terra Zimmer [facility]  11/29/23, 16:35 EST

## 2023-11-30 RX ORDER — ROSUVASTATIN CALCIUM 40 MG/1
40 TABLET, COATED ORAL DAILY
Qty: 90 TABLET | Refills: 0 | Status: SHIPPED | OUTPATIENT
Start: 2023-11-30

## 2023-11-30 RX ORDER — OMEGA-3-ACID ETHYL ESTERS 1 G/1
CAPSULE, LIQUID FILLED ORAL
Qty: 180 CAPSULE | Refills: 0 | Status: SHIPPED | OUTPATIENT
Start: 2023-11-30

## 2023-12-04 DIAGNOSIS — E08.42 DIABETIC POLYNEUROPATHY ASSOCIATED WITH DIABETES MELLITUS DUE TO UNDERLYING CONDITION: ICD-10-CM

## 2023-12-04 DIAGNOSIS — I10 ESSENTIAL (PRIMARY) HYPERTENSION: ICD-10-CM

## 2023-12-04 RX ORDER — GABAPENTIN 400 MG/1
CAPSULE ORAL
Qty: 180 CAPSULE | Refills: 0 | OUTPATIENT
Start: 2023-12-04

## 2023-12-04 RX ORDER — AMLODIPINE BESYLATE 10 MG/1
10 TABLET ORAL DAILY
Qty: 90 TABLET | Refills: 0 | Status: SHIPPED | OUTPATIENT
Start: 2023-12-04

## 2023-12-04 NOTE — TELEPHONE ENCOUNTER
Hub is instructed to read the documentation below to patient    CALLED PATIENT. NO ANSWER. PATIENT WAS GOING TO BE NOTIFIED OF THE FOLLOWING:    GABAPENTIN WAS SENT TO [facility] ON 08/03/2023 WITH 1 ADDITIONAL REFILL. PHARMACY CONFIRMED RECEIPT OF RX THAT DAY AT 4:31PM.     PATIENT NEEDS TO CONTACT PHARMACY.

## 2023-12-05 ENCOUNTER — TREATMENT (OUTPATIENT)
Dept: PHYSICAL THERAPY | Facility: CLINIC | Age: 58
End: 2023-12-05
Payer: COMMERCIAL

## 2023-12-05 DIAGNOSIS — M53.3 SI (SACROILIAC) JOINT DYSFUNCTION: ICD-10-CM

## 2023-12-05 DIAGNOSIS — M54.50 ACUTE LEFT-SIDED LOW BACK PAIN, UNSPECIFIED WHETHER SCIATICA PRESENT: Primary | ICD-10-CM

## 2023-12-05 DIAGNOSIS — R20.0 NUMBNESS AND TINGLING OF LEFT LEG: ICD-10-CM

## 2023-12-05 DIAGNOSIS — R20.2 NUMBNESS AND TINGLING OF LEFT LEG: ICD-10-CM

## 2023-12-05 PROCEDURE — 97530 THERAPEUTIC ACTIVITIES: CPT | Performed by: PHYSICAL THERAPIST

## 2023-12-05 PROCEDURE — 97110 THERAPEUTIC EXERCISES: CPT | Performed by: PHYSICAL THERAPIST

## 2023-12-05 PROCEDURE — 97012 MECHANICAL TRACTION THERAPY: CPT | Performed by: PHYSICAL THERAPIST

## 2023-12-05 NOTE — PROGRESS NOTES
Physical Therapy Daily Treatment Note      [facility] [place]              [address], [address]                [facility] [place]  [address]        Patient: Gustavo Singh   : 1965  Diagnosis/ICD-10 Code:  Acute left-sided low back pain, unspecified whether sciatica present [M54.50]  Referring practitioner: TAY Andrea  Date of Initial Visit: Type: THERAPY  Noted: 10/23/2023  Today's Date: 2023  Patient seen for 10 sessions         Subjective    Gustavo Singh reports: his back is feeling pretty good.  He said his leg is getting better.  The numbness is going down.  No pain or problems since last session.  He was a little sore after the traction last session but it was fine by the next morning.            Objective   See Exercise, Manual, and Modality Logs for complete treatment.       Assessment/Plan  Continued to review core strengthening on supported surface with intermittent cueing required for technique.  Also added standing core strengthening to progress with more strengthening.  Continued with traction however maintained weight as pt reported some soreness after last session.  Will continue to monitor and progress with exercises as tolerated.     Progress per Plan of Care           Timed:  Manual Therapy:         mins  51495;  Therapeutic Exercise:    35     mins  75090;     Neuromuscular Mary:        mins  59940;    Therapeutic Activity:     10     mins  68029;     Gait Training:           mins  64384;     Ultrasound:          mins  98573;     Work Conditioning/Hardening (initial 2 hours)        mins  46118  Work Conditioning/Hardening (each add'l hour)        mins  03899    Untimed:   Electrical Stimulation:         mins  74024 (MC );  Traction     15     mins 51047    Timed Treatment:   45   mins   Total Treatment:     60   mins    Tarsha Cho PTA  Physical Therapist Assistant

## 2023-12-08 ENCOUNTER — TREATMENT (OUTPATIENT)
Dept: PHYSICAL THERAPY | Facility: CLINIC | Age: 58
End: 2023-12-08
Payer: COMMERCIAL

## 2023-12-08 DIAGNOSIS — M54.50 ACUTE LEFT-SIDED LOW BACK PAIN, UNSPECIFIED WHETHER SCIATICA PRESENT: Primary | ICD-10-CM

## 2023-12-08 DIAGNOSIS — R20.2 NUMBNESS AND TINGLING OF LEFT LEG: ICD-10-CM

## 2023-12-08 DIAGNOSIS — M53.3 SI (SACROILIAC) JOINT DYSFUNCTION: ICD-10-CM

## 2023-12-08 DIAGNOSIS — R20.0 NUMBNESS AND TINGLING OF LEFT LEG: ICD-10-CM

## 2023-12-08 PROCEDURE — 97530 THERAPEUTIC ACTIVITIES: CPT | Performed by: PHYSICAL THERAPIST

## 2023-12-08 PROCEDURE — 97110 THERAPEUTIC EXERCISES: CPT | Performed by: PHYSICAL THERAPIST

## 2023-12-08 PROCEDURE — 97012 MECHANICAL TRACTION THERAPY: CPT | Performed by: PHYSICAL THERAPIST

## 2023-12-08 NOTE — PROGRESS NOTES
Physical Therapy Daily Treatment Note      [facility] [place]              [address], [address]                [facility] [place]  [address]        Patient: Gustavo Singh   : 1965  Diagnosis/ICD-10 Code:  Acute left-sided low back pain, unspecified whether sciatica present [M54.50]  Referring practitioner: TAY Andrea  Date of Initial Visit: Type: THERAPY  Noted: 10/23/2023  Today's Date: 2023  Patient seen for 11 sessions         Subjective    Gustavo Singh reports: he was sore after last time but he also played cards and sat for about 5 hours and doesn't usually sit that long.  He was fine the next morning.  Last Friday he went to a basketball game and had pain with sitting on the bleachers.  He has never had that problem before.            Objective   See Exercise, Manual, and Modality Logs for complete treatment.       Assessment/Plan  Gentle progression of standing exercises to improve functional strength.  He reported no complaints with the additions and continued to review exercises from previous sessions with intermittent cuing for technique.  He continued to report relief from traction thus continued at previous weight but will continue to progress as tolerated.     Progress per Plan of Care           Timed:  Manual Therapy:         mins  10418;  Therapeutic Exercise:    25     mins  35411;     Neuromuscular Mary:        mins  48517;    Therapeutic Activity:     10     mins  09089;     Gait Training:           mins  26720;     Ultrasound:          mins  47060;     Work Conditioning/Hardening (initial 2 hours)        mins  31235  Work Conditioning/Hardening (each add'l hour)        mins  94687    Untimed:   Electrical Stimulation:         mins  58276 ( );  Traction     15     mins 31980    Timed Treatment:   35   mins   Total Treatment:     50   mins    Tarsha Cho PTA  Physical Therapist Assistant

## 2023-12-09 DIAGNOSIS — E87.6 HYPOKALEMIA: ICD-10-CM

## 2023-12-12 ENCOUNTER — TREATMENT (OUTPATIENT)
Dept: PHYSICAL THERAPY | Facility: CLINIC | Age: 58
End: 2023-12-12
Payer: COMMERCIAL

## 2023-12-12 DIAGNOSIS — R20.0 NUMBNESS AND TINGLING OF LEFT LEG: ICD-10-CM

## 2023-12-12 DIAGNOSIS — M53.3 SI (SACROILIAC) JOINT DYSFUNCTION: ICD-10-CM

## 2023-12-12 DIAGNOSIS — M54.50 ACUTE LEFT-SIDED LOW BACK PAIN, UNSPECIFIED WHETHER SCIATICA PRESENT: Primary | ICD-10-CM

## 2023-12-12 DIAGNOSIS — R20.2 NUMBNESS AND TINGLING OF LEFT LEG: ICD-10-CM

## 2023-12-12 PROCEDURE — 97110 THERAPEUTIC EXERCISES: CPT | Performed by: PHYSICAL THERAPIST

## 2023-12-12 PROCEDURE — 97530 THERAPEUTIC ACTIVITIES: CPT | Performed by: PHYSICAL THERAPIST

## 2023-12-12 PROCEDURE — 97012 MECHANICAL TRACTION THERAPY: CPT | Performed by: PHYSICAL THERAPIST

## 2023-12-12 RX ORDER — POTASSIUM CHLORIDE 20 MEQ/1
TABLET, EXTENDED RELEASE ORAL
Qty: 60 TABLET | Refills: 0 | Status: SHIPPED | OUTPATIENT
Start: 2023-12-12

## 2023-12-12 NOTE — PROGRESS NOTES
Physical Therapy Daily Treatment Note      [facility] [place]              [address], [address]                [facility] [place]  [address]        Patient: Gustavo Singh   : 1965  Diagnosis/ICD-10 Code:  Acute left-sided low back pain, unspecified whether sciatica present [M54.50]  Referring practitioner: TAY Andrea  Date of Initial Visit: Type: THERAPY  Noted: 10/23/2023  Today's Date: 2023  Patient seen for 12 sessions         Subjective    Gustavo Singh reports: the numbness in the back of his thigh has been about the same for the last week.  He doesn't favor his (L) leg or scuff his foot as much.  He still notices it going up the ramp at his house.            Objective   See Exercise, Manual, and Modality Logs for complete treatment.       Assessment/Plan  Continued to focus on progression of standing exercises for increased functional strength.  He tolerated all exercises without complaints and required minimal cues for technique.  May add quadruped exercises next session.  Continued with traction however increased weight as noted.  He continued to report a stretch but no increased symptoms.  Will monitor and progress as able.     Progress per Plan of Care           Timed:  Manual Therapy:         mins  32272;  Therapeutic Exercise:    25     mins  80806;     Neuromuscular Mary:        mins  83262;    Therapeutic Activity:     10     mins  23265;     Gait Training:           mins  96726;     Ultrasound:          mins  47649;     Work Conditioning/Hardening (initial 2 hours)        mins  21120  Work Conditioning/Hardening (each add'l hour)        mins  24806    Untimed:   Electrical Stimulation:         mins  03154 (MC );  Traction     15     mins 33588    Timed Treatment:   35   mins   Total Treatment:     50   mins    Tarsha Cho PTA  Physical Therapist Assistant

## 2023-12-14 ENCOUNTER — TREATMENT (OUTPATIENT)
Dept: PHYSICAL THERAPY | Facility: CLINIC | Age: 58
End: 2023-12-14
Payer: COMMERCIAL

## 2023-12-14 DIAGNOSIS — M54.50 ACUTE LEFT-SIDED LOW BACK PAIN, UNSPECIFIED WHETHER SCIATICA PRESENT: Primary | ICD-10-CM

## 2023-12-14 DIAGNOSIS — M53.3 SI (SACROILIAC) JOINT DYSFUNCTION: ICD-10-CM

## 2023-12-14 DIAGNOSIS — R20.0 NUMBNESS AND TINGLING OF LEFT LEG: ICD-10-CM

## 2023-12-14 DIAGNOSIS — R20.2 NUMBNESS AND TINGLING OF LEFT LEG: ICD-10-CM

## 2023-12-14 PROCEDURE — 97530 THERAPEUTIC ACTIVITIES: CPT | Performed by: PHYSICAL THERAPIST

## 2023-12-14 PROCEDURE — 97110 THERAPEUTIC EXERCISES: CPT | Performed by: PHYSICAL THERAPIST

## 2023-12-14 NOTE — PROGRESS NOTES
Physical Therapy Daily Treatment Note      [facility] [place]              [address], [address]                [facility] [place]  [address]        Patient: Gustavo Singh   : 1965  Diagnosis/ICD-10 Code:  Acute left-sided low back pain, unspecified whether sciatica present [M54.50]  Referring practitioner: TAY Andrea  Date of Initial Visit: Type: THERAPY  Noted: 10/23/2023  Today's Date: 2023  Patient seen for 13 sessions         Subjective    Gustavo Singh reports: it is about the same as last time.  He feels like he has good mobility.  He still has some weakness on the (L) with walking a lot.           Objective   See Exercise, Manual, and Modality Logs for complete treatment.       Assessment/Plan  Continued to progress exercises as noted with focus on functional positions.  He reported no complaints with progressions.  Cues provided for technique.  Plan to continue to progress with functional activities.  Increased weight on traction and will monitor tolerance.  May begin to wean from traction to increase focus on strengthening.     Progress per Plan of Care           Timed:  Manual Therapy:         mins  30886;  Therapeutic Exercise:    25     mins  14404;     Neuromuscular Mary:        mins  68474;    Therapeutic Activity:     10     mins  48201;     Gait Training:           mins  21463;     Ultrasound:          mins  28381;     Work Conditioning/Hardening (initial 2 hours)        mins  86703  Work Conditioning/Hardening (each add'l hour)        mins  85281    Untimed:   Electrical Stimulation:         mins  44610 ( );  Traction     15     mins 51429    Timed Treatment:   35   mins   Total Treatment:     50   mins    Tarsha Cho PTA  Physical Therapist Assistant

## 2023-12-19 ENCOUNTER — TREATMENT (OUTPATIENT)
Dept: PHYSICAL THERAPY | Facility: CLINIC | Age: 58
End: 2023-12-19
Payer: COMMERCIAL

## 2023-12-19 DIAGNOSIS — M54.50 ACUTE LEFT-SIDED LOW BACK PAIN, UNSPECIFIED WHETHER SCIATICA PRESENT: Primary | ICD-10-CM

## 2023-12-19 DIAGNOSIS — R20.0 NUMBNESS AND TINGLING OF LEFT LEG: ICD-10-CM

## 2023-12-19 DIAGNOSIS — M53.3 SI (SACROILIAC) JOINT DYSFUNCTION: ICD-10-CM

## 2023-12-19 DIAGNOSIS — R20.2 NUMBNESS AND TINGLING OF LEFT LEG: ICD-10-CM

## 2023-12-19 PROCEDURE — 97110 THERAPEUTIC EXERCISES: CPT | Performed by: PHYSICAL THERAPIST

## 2023-12-19 PROCEDURE — 97530 THERAPEUTIC ACTIVITIES: CPT | Performed by: PHYSICAL THERAPIST

## 2023-12-19 NOTE — PROGRESS NOTES
Physical Therapy Daily Treatment Note      [facility] [place]              [address], [address]                [facility] [place]  [address]        Patient: Gustavo Singh   : 1965  Diagnosis/ICD-10 Code:  Acute left-sided low back pain, unspecified whether sciatica present [M54.50]  Referring practitioner: TAY Andrea  Date of Initial Visit: Type: THERAPY  Noted: 10/23/2023  Today's Date: 2023  Patient seen for 14 sessions         Subjective    Gustavo Singh reports: the numbness is about the same but he feels like his leg is kind of lazy.  He can walk further before he gets the weakness in the leg but he still gets it and sometimes has trouble getting his foot up.            Objective   See Exercise, Manual, and Modality Logs for complete treatment.       Assessment/Plan  Progressed strengthening as noted to include more functional exercises.  He reported some fatigue however no increased pain.  He was able to return demonstrate good technique with exercises following cues.  Held traction to trial without use as no significant change in symptoms over the last several sessions and strengthening may be more beneficial at this time.   Will monitor and progress as appropriate.     Progress per Plan of Care           Timed:  Manual Therapy:         mins  98507;  Therapeutic Exercise:    25     mins  99044;     Neuromuscular Mary:        mins  78034;    Therapeutic Activity:     20     mins  25118;     Gait Training:           mins  13802;     Ultrasound:          mins  81880;     Work Conditioning/Hardening (initial 2 hours)        mins  41690  Work Conditioning/Hardening (each add'l hour)        mins  16340    Untimed:   Electrical Stimulation:         mins  28410 (MC );  Traction          mins 21014    Timed Treatment:   45   mins   Total Treatment:     45   mins    Tarsha Cho PTA  Physical Therapist Assistant

## 2023-12-22 ENCOUNTER — TREATMENT (OUTPATIENT)
Dept: PHYSICAL THERAPY | Facility: CLINIC | Age: 58
End: 2023-12-22
Payer: COMMERCIAL

## 2023-12-22 DIAGNOSIS — R20.2 NUMBNESS AND TINGLING OF LEFT LEG: ICD-10-CM

## 2023-12-22 DIAGNOSIS — M54.50 ACUTE LEFT-SIDED LOW BACK PAIN, UNSPECIFIED WHETHER SCIATICA PRESENT: Primary | ICD-10-CM

## 2023-12-22 DIAGNOSIS — R20.0 NUMBNESS AND TINGLING OF LEFT LEG: ICD-10-CM

## 2023-12-22 DIAGNOSIS — M53.3 SI (SACROILIAC) JOINT DYSFUNCTION: ICD-10-CM

## 2023-12-22 NOTE — PROGRESS NOTES
Physical Therapy 30-Day Progress,Treatment Note  And Discharge Summary   [address] [address] [address]  [place] [address]      Patient: Gustavo Singh  : 1965  Referring Practitioner: TAY Andrea  Date of Initial Visit: Type: THERAPY  Noted: 10/23/2023  Today's Date: 2023  Patient seen for: 15 sessions      Visit Diagnoses:     ICD-10-CM ICD-9-CM   1. Acute left-sided low back pain, unspecified whether sciatica present  M54.50 724.2   2. Numbness and tingling of left leg  R20.0 782.0    R20.2    3. SI (sacroiliac) joint dysfunction  M53.3 724.6       Subjective   Gustavo Singh reports the pain is gone from the back and into the leg.  He is left with residual numbness in the buttock L.  Doing ex at home.  Has a red band for the hip ex. He does notice the left leg feels heavy and weak  Pain levels 0-3/10   ( eval 2-9/10)  Oswestry score:    14/50 = 28% (eval 21 = 42% limited;     Objective   Trunk ROM:   WNLs wo pain all planes.  Flexibility:  mod+ limits of jessica hams, hip ER, quads and piriformis ( L hip more restricted than the R)   (-) neural tension tests  Lumbar PIVM  1+   Palpation  no ttp of lumbosacral region or facets, PSIS  See Exercise, Manual, and Modality Logs for complete treatment.     Patient Education: cont with stretch and strengthening ex 3 x per week.  Review of what s/s to call MD.    Assessment/Plan  Gustavo has been to PT x's 15 sessions since the eval date of 10/23/23 for treatment for lumbar pain and radiculopathy.  He is no longer experiencing pain, just residual numbness in the posterior L hip.       STGs in 4 weeks:  met 23  1)   Decrease pain to 6/10 on average  Increase trunk/LE ROM by 10 degrees where limited as much  2)   Increase B hip ext strength to 4/5  Improve pelvic/sacral alignment     LTGs by discharge  1)   Increase trunk/LE ROM to WFL/WNL met 23   2)   Increase LE strength to 5/5   partial met 23   3)   Pt will be able to  sit/drive/ride car or mower for 30-60 mins without difficulty or pain met 12/22/23  4)   Pt will be able to stand 30-60 mins for basic ADLs without difficulty or pain [met 12/22/23   5)   Pt will be able to walk 30-60 mins for grocery shopping without difficulty, pain or LOB 12/22/23  6)   Pt will be able to lift/carry laundry baskets, pots/pans, garbage or grocery bags without difficulty or increased pain 12/22/23     Plan:   DC PT           Timed:         Manual Therapy:    12     mins  03308;     Therapeutic Exercise:    13     mins  65406;     Neuromuscular Mary:        mins  11704;    Therapeutic Activity:     14     mins  57599;     Gait Training:           mins  38984;     Ultrasound:          mins  02790;    Ionto                                   mins   83726  Self Care                            mins   88335      Un-Timed:  Electrical Stimulation:         mins  53848 ( );  Traction     15     mins 90693    Timed Treatment:   39   mins   Total Treatment:     54   mins              Skylar Mukherjee, PT    Physical Therapist

## 2024-01-12 DIAGNOSIS — E87.6 HYPOKALEMIA: ICD-10-CM

## 2024-01-12 RX ORDER — POTASSIUM CHLORIDE 20 MEQ/1
TABLET, EXTENDED RELEASE ORAL
Qty: 60 TABLET | Refills: 0 | Status: SHIPPED | OUTPATIENT
Start: 2024-01-12

## 2024-01-16 NOTE — TELEPHONE ENCOUNTER
Rx Refill Note  Requested Prescriptions     Pending Prescriptions Disp Refills    metFORMIN (GLUCOPHAGE) 500 MG tablet [Pharmacy Med Name: metformin 500 mg tablet] 270 tablet 0     Sig: TAKE ONE TABLET BY MOUTH THREE TIMES DAILY WITH MEALS      Last office visit with prescribing clinician: 9/21/2023   Last telemedicine visit with prescribing clinician: Visit date not found   Next office visit with prescribing clinician: 3/6/2024                         Would you like a call back once the refill request has been completed: [] Yes [] No    If the office needs to give you a call back, can they leave a voicemail: [] Yes [] No    Terra Zimmer CMA  01/16/24, 11:44 EST

## 2024-02-05 DIAGNOSIS — E11.9 TYPE 2 DIABETES MELLITUS WITHOUT COMPLICATION, WITHOUT LONG-TERM CURRENT USE OF INSULIN: ICD-10-CM

## 2024-02-05 DIAGNOSIS — E08.42 DIABETIC POLYNEUROPATHY ASSOCIATED WITH DIABETES MELLITUS DUE TO UNDERLYING CONDITION: ICD-10-CM

## 2024-02-06 RX ORDER — GABAPENTIN 400 MG/1
CAPSULE ORAL
Qty: 180 CAPSULE | Refills: 1 | Status: SHIPPED | OUTPATIENT
Start: 2024-02-06

## 2024-02-06 RX ORDER — ALOGLIPTIN 25 MG/1
TABLET, FILM COATED ORAL
Qty: 90 TABLET | Refills: 0 | Status: SHIPPED | OUTPATIENT
Start: 2024-02-06

## 2024-02-06 NOTE — TELEPHONE ENCOUNTER
Rx Refill Note  Requested Prescriptions     Pending Prescriptions Disp Refills    gabapentin (NEURONTIN) 400 MG capsule [Pharmacy Med Name: gabapentin 400 mg capsule] 180 capsule 1     Sig: TAKE ONE CAPSULE BY MOUTH TWICE DAILY    Alogliptin Benzoate 25 MG tablet [Pharmacy Med Name: alogliptin 25 mg tablet] 90 tablet 0     Sig: TAKE ONE TABLET BY MOUTH EVERY DAY      Last office visit with prescribing clinician: 9/21/2023   Last telemedicine visit with prescribing clinician: Visit date not found   Next office visit with prescribing clinician: 3/6/2024     INSPECT - SCAN - INSPECT/[facility]/02.06.2024 (02/06/2024)       Would you like a call back once the refill request has been completed: [] Yes [] No    If the office needs to give you a call back, can they leave a voicemail: [] Yes [] No    Terra Zimmer CMA  02/06/24, 08:51 EST

## 2024-02-12 DIAGNOSIS — E87.6 HYPOKALEMIA: ICD-10-CM

## 2024-02-12 DIAGNOSIS — I10 BENIGN HYPERTENSION: ICD-10-CM

## 2024-02-14 RX ORDER — HYDROCHLOROTHIAZIDE 25 MG/1
TABLET ORAL
Qty: 90 TABLET | Refills: 0 | Status: SHIPPED | OUTPATIENT
Start: 2024-02-14

## 2024-02-14 RX ORDER — POTASSIUM CHLORIDE 20 MEQ/1
TABLET, EXTENDED RELEASE ORAL
Qty: 60 TABLET | Refills: 0 | Status: SHIPPED | OUTPATIENT
Start: 2024-02-14

## 2024-03-04 DIAGNOSIS — E78.1 HYPERTRIGLYCERIDEMIA: ICD-10-CM

## 2024-03-04 DIAGNOSIS — I10 ESSENTIAL (PRIMARY) HYPERTENSION: ICD-10-CM

## 2024-03-04 DIAGNOSIS — E78.2 MIXED HYPERLIPIDEMIA: ICD-10-CM

## 2024-03-06 ENCOUNTER — OFFICE VISIT (OUTPATIENT)
Dept: FAMILY MEDICINE CLINIC | Facility: CLINIC | Age: 59
End: 2024-03-06
Payer: COMMERCIAL

## 2024-03-06 VITALS
WEIGHT: 210.8 LBS | TEMPERATURE: 98.4 F | SYSTOLIC BLOOD PRESSURE: 129 MMHG | DIASTOLIC BLOOD PRESSURE: 76 MMHG | BODY MASS INDEX: 27.94 KG/M2 | HEART RATE: 66 BPM | HEIGHT: 73 IN | OXYGEN SATURATION: 96 %

## 2024-03-06 DIAGNOSIS — I10 ESSENTIAL (PRIMARY) HYPERTENSION: ICD-10-CM

## 2024-03-06 DIAGNOSIS — E78.2 MIXED HYPERLIPIDEMIA: ICD-10-CM

## 2024-03-06 DIAGNOSIS — E78.1 HYPERTRIGLYCERIDEMIA: ICD-10-CM

## 2024-03-06 DIAGNOSIS — E11.65 TYPE 2 DIABETES MELLITUS WITH HYPERGLYCEMIA, WITHOUT LONG-TERM CURRENT USE OF INSULIN: Primary | ICD-10-CM

## 2024-03-06 PROCEDURE — 99214 OFFICE O/P EST MOD 30 MIN: CPT | Performed by: FAMILY MEDICINE

## 2024-03-06 PROCEDURE — 83036 HEMOGLOBIN GLYCOSYLATED A1C: CPT | Performed by: FAMILY MEDICINE

## 2024-03-06 RX ORDER — AMLODIPINE BESYLATE 10 MG/1
10 TABLET ORAL DAILY
Qty: 90 TABLET | Refills: 0 | Status: SHIPPED | OUTPATIENT
Start: 2024-03-06

## 2024-03-06 RX ORDER — ROSUVASTATIN CALCIUM 40 MG/1
40 TABLET, COATED ORAL DAILY
Qty: 90 TABLET | Refills: 0 | Status: SHIPPED | OUTPATIENT
Start: 2024-03-06

## 2024-03-06 RX ORDER — OMEGA-3-ACID ETHYL ESTERS 1 G/1
1 CAPSULE, LIQUID FILLED ORAL 2 TIMES DAILY
Qty: 180 CAPSULE | Refills: 0 | Status: SHIPPED | OUTPATIENT
Start: 2024-03-06

## 2024-03-06 NOTE — PROGRESS NOTES
Venipuncture Blood Specimen Collection  Venipuncture performed in rt arm via venipuncture by Noemi Carreno with good hemostasis. Patient tolerated the procedure well without complications.   03/06/24   Noemi Carreno

## 2024-03-06 NOTE — PROGRESS NOTES
NAME@ presents to [facility]      Chief Complaint    Diabetes    HPI (Exercise stress test done this past week. No chest pain. History of Atherosclerotic coronary artery vascular disease, 1 stent placed in 2018)    Memory Loss:  Altered Mentation: No.    Tremors:  No.    Eye Exam Date:  9/2023    Blurred Vision:  No.    Frequent Urination:   No.    Gastroenterology: Nausea: No. Increased Appetite: No.     Increased Thirst: No.    Life style: Rises at 10-11 AM, Reclines 2-3 AM                   Breakfast 11 AM                   Lunch 1-2 PM                   Supper 6 PM                   Bedtime 1-2 AM (cereal, package frozen meal)      Current Outpatient Medications:     Alogliptin Benzoate 25 MG tablet, TAKE ONE TABLET BY MOUTH EVERY DAY, Disp: 90 tablet, Rfl: 0    amLODIPine (NORVASC) 10 MG tablet, Take 1 tablet by mouth Daily., Disp: 90 tablet, Rfl: 0    ASPIRIN 81 PO, Take 81 mg by mouth Daily., Disp: , Rfl:     betamethasone dipropionate (DIPROLENE) 0.05 % ointment, Apply 1 Application topically to the appropriate area as directed 2 (Two) Times a Day., Disp: , Rfl:     fexofenadine (ALLEGRA) 180 MG tablet, Take 1 tablet by mouth As Needed., Disp: , Rfl:     gabapentin (NEURONTIN) 400 MG capsule, TAKE ONE CAPSULE BY MOUTH TWICE DAILY, Disp: 180 capsule, Rfl: 1    hydroCHLOROthiazide 25 MG tablet, TAKE ONE TABLET BY MOUTH EVERY MORNING, Disp: 90 tablet, Rfl: 0    metFORMIN (GLUCOPHAGE) 500 MG tablet, TAKE ONE TABLET BY MOUTH THREE TIMES DAILY WITH MEALS, Disp: 270 tablet, Rfl: 0    montelukast (SINGULAIR) 10 MG tablet, TAKE ONE TABLET BY MOUTH IN THE EVENING AS NEEDED, Disp: 90 tablet, Rfl: 3    multivitamin with minerals tablet tablet, Take 1 tablet by mouth Daily., Disp: , Rfl:     niacin (NIASPAN) 500 MG CR tablet, Take 1 tablet by mouth Daily., Disp: , Rfl:     olmesartan (BENICAR) 40 MG tablet, Take 1 tablet by mouth Daily., Disp: , Rfl:     omega-3 acid ethyl esters (LOVAZA)  1 g capsule, Take 1 capsule by mouth 2 (Two) Times a Day., Disp: 180 capsule, Rfl: 0    potassium chloride (K-DUR,KLOR-CON) 20 MEQ CR tablet, TAKE ONE TABLET BY MOUTH TWICE DAILY, Disp: 60 tablet, Rfl: 0    rosuvastatin (CRESTOR) 40 MG tablet, Take 1 tablet by mouth Daily., Disp: 90 tablet, Rfl: 0    cyclobenzaprine (FLEXERIL) 10 MG tablet, Take 1 tablet by mouth At Night As Needed for Muscle Spasms., Disp: 7 tablet, Rfl: 0    predniSONE (DELTASONE) 20 MG tablet, Take 1 tablet by mouth Daily., Disp: 7 tablet, Rfl: 0     Allergies   Allergen Reactions    Tenoretic [Atenolol-Chlorthalidone] Other (See Comments)     ED    Eggs Or Egg-Derived Products Other (See Comments)     Positive allergy test    Lisinopril Other (See Comments)     drowsy    Penicillins Hives        Past Medical History:   Diagnosis Date    Allergies     eggs, corn, green beans- immunotherapy in childhood    Diabetes mellitus     Hyperlipidemia     Hypertension     CHASITY (obstructive sleep apnea)     Tobacco chew use     quit in 1985    Wears dentures     upper    Wears glasses        Past Surgical History:   Procedure Laterality Date    CARDIAC CATHETERIZATION  10/2018    CHD, 1 stent placement    CIRCUMCISION  08/1964    COLONOSCOPY  1985    HEEL SPUR SURGERY Left 2002    TONSILLECTOMY AND ADENOIDECTOMY  1981    WISDOM TOOTH EXTRACTION          Family History   Problem Relation Age of Onset    Hypertension Mother     Hyperlipidemia Mother     Liver cancer Mother     Depression Father     Alcohol abuse Father     Hypertension Sister     Diabetes Sister     Hypertension Sister     Diabetes Sister     Liver cancer Sister     No Known Problems Daughter     Heart attack Son     No Known Problems Maternal Grandmother     Stroke Maternal Grandfather     Stroke Paternal Grandmother     No Known Problems Paternal Grandfather         Social History     Socioeconomic History    Marital status:    Tobacco Use    Smoking status: Never     Passive  "exposure: Never    Smokeless tobacco: Former     Types: Chew     Quit date:    Vaping Use    Vaping status: Never Used   Substance and Sexual Activity    Alcohol use: Not Currently    Drug use: Not Currently    Sexual activity: Defer              Objective   Vital Signs:  /76 (BP Location: Left arm, Patient Position: Sitting, Cuff Size: Adult)   Pulse 66   Temp 98.4 °F (36.9 °C) (Infrared)   Ht 185.4 cm (73\")   Wt 95.6 kg (210 lb 12.8 oz)   SpO2 96%   BMI 27.81 kg/m²   Estimated body mass index is 27.81 kg/m² as calculated from the following:    Height as of this encounter: 185.4 cm (73\").    Weight as of this encounter: 95.6 kg (210 lb 12.8 oz).      Physical Exam  General:  No acute distress, excellent energy level, well kept.  HEART:   Regular rate and rhythm, no murmur.  Lungs:     Clear to auscultation, excellent air movement throughout the lung fields.  Abdomen:   Bowel sounds normal pitch and frequency x 4 quadrants.  Abdomen soft, non-tender, no masses palpable, no organomegaly.  Lower Extremities:  No Integument changes.  Toes are warm and pink.  Normal peripheral pulses.  Neurologic:  Vibratory sense:             Normal                        Proprioception:               Normal                        Monofilament sensory:   Normal                        Tremor:                           Absent    Endocrine:   Home BG readings: Date: -3/6                                                                   Fastin-252    Result Review :                    Assessment and Plan   Diagnoses and all orders for this visit:    1. Type 2 diabetes mellitus with hyperglycemia, without long-term current use of insulin (Primary)  -     Hemoglobin A1c    2. Mixed hyperlipidemia  -     rosuvastatin (CRESTOR) 40 MG tablet; Take 1 tablet by mouth Daily.  Dispense: 90 tablet; Refill: 0    3. Hypertriglyceridemia  -     omega-3 acid ethyl esters (LOVAZA) 1 g capsule; Take 1 capsule by mouth 2 (Two) " Times a Day.  Dispense: 180 capsule; Refill: 0    4. Essential (primary) hypertension  -     amLODIPine (NORVASC) 10 MG tablet; Take 1 tablet by mouth Daily.  Dispense: 90 tablet; Refill: 0      MDM: Discussed appropriate meal times and proper nutrition. Change late night meal to small portion of salad with baked chicken without dressing, or light portion of vegetable with baked fish at 11 PM and no later    Follow Up   Return in about 3 months (around 6/6/2024) for Cor, Fasting labs.    Patient was given instructions and counseling regarding his condition or for health maintenance advice. Please see specific information pulled into the AVS if appropriate.   Mirtha Marcos M.D.

## 2024-03-07 LAB — HBA1C MFR BLD: 8.1 % (ref 4.8–5.6)

## 2024-03-07 RX ORDER — AMLODIPINE BESYLATE 10 MG/1
10 TABLET ORAL DAILY
Qty: 90 TABLET | Refills: 0 | OUTPATIENT
Start: 2024-03-07

## 2024-03-07 RX ORDER — ROSUVASTATIN CALCIUM 40 MG/1
40 TABLET, COATED ORAL DAILY
Qty: 90 TABLET | Refills: 0 | OUTPATIENT
Start: 2024-03-07

## 2024-03-07 RX ORDER — OMEGA-3-ACID ETHYL ESTERS 1 G/1
CAPSULE, LIQUID FILLED ORAL
Qty: 180 CAPSULE | Refills: 0 | OUTPATIENT
Start: 2024-03-07

## 2024-03-27 DIAGNOSIS — E87.6 HYPOKALEMIA: ICD-10-CM

## 2024-03-28 RX ORDER — POTASSIUM CHLORIDE 20 MEQ/1
TABLET, EXTENDED RELEASE ORAL
Qty: 180 TABLET | Refills: 0 | Status: SHIPPED | OUTPATIENT
Start: 2024-03-28

## 2024-03-28 NOTE — TELEPHONE ENCOUNTER
Rx Refill Note  Requested Prescriptions     Pending Prescriptions Disp Refills    potassium chloride (KLOR-CON M20) 20 MEQ CR tablet [Pharmacy Med Name: potassium chloride ER 20 mEq tablet,extended release(part/cryst)] 60 tablet 0     Sig: TAKE ONE TABLET BY MOUTH TWICE DAILY      Last office visit with prescribing clinician: 3/6/2024   Last telemedicine visit with prescribing clinician: Visit date not found   Next office visit with prescribing clinician: Visit date not found                         Would you like a call back once the refill request has been completed: [] Yes [] No    If the office needs to give you a call back, can they leave a voicemail: [] Yes [] No    Terra Zimmer, ALBA  03/28/24, 09:02 EDT

## 2024-04-22 NOTE — TELEPHONE ENCOUNTER
Patient is off boarding from Dr Marcos. Has appointment on 06/06/2024    Rx Refill Note  Requested Prescriptions     Pending Prescriptions Disp Refills    metFORMIN (GLUCOPHAGE) 500 MG tablet 270 tablet 0     Sig: Take 1 tablet by mouth 3 (Three) Times a Day With Meals.      Last office visit with prescribing clinician: 3/6/2024   Last telemedicine visit with prescribing clinician: Visit date not found   Next office visit with prescribing clinician: Visit date not found     Hemoglobin A1c (03/06/2024 12:05)     Lipid panel (10/20/2022 13:56)     Would you like a call back once the refill request has been completed: [] Yes [] No    If the office needs to give you a call back, can they leave a voicemail: [] Yes [] No    Terra Zimmer MA  04/22/24, 16:52 EDT

## 2024-05-20 DIAGNOSIS — I10 BENIGN HYPERTENSION: ICD-10-CM

## 2024-05-20 RX ORDER — HYDROCHLOROTHIAZIDE 25 MG/1
TABLET ORAL
Qty: 90 TABLET | Refills: 0 | Status: SHIPPED | OUTPATIENT
Start: 2024-05-20

## 2024-05-20 NOTE — TELEPHONE ENCOUNTER
Patient is off boarding from Dr. Marcos. Has an appointment on 06.06.2024    Rx Refill Note  Requested Prescriptions     Pending Prescriptions Disp Refills    hydroCHLOROthiazide 25 MG tablet [Pharmacy Med Name: hydrochlorothiazide 25 mg tablet] 90 tablet 0     Sig: TAKE ONE TABLET BY MOUTH EVERY MORNING      Last office visit with prescribing clinician: 3/6/2024   Last telemedicine visit with prescribing clinician: Visit date not found   Next office visit with prescribing clinician: Visit date not found                         Would you like a call back once the refill request has been completed: [] Yes [] No    If the office needs to give you a call back, can they leave a voicemail: [] Yes [] No    Terra Zimmer MA  05/20/24, 08:56 EDT

## 2024-06-03 DIAGNOSIS — E78.2 MIXED HYPERLIPIDEMIA: ICD-10-CM

## 2024-06-03 DIAGNOSIS — E87.6 HYPOKALEMIA: ICD-10-CM

## 2024-06-03 DIAGNOSIS — I10 ESSENTIAL (PRIMARY) HYPERTENSION: ICD-10-CM

## 2024-06-03 DIAGNOSIS — E78.1 HYPERTRIGLYCERIDEMIA: ICD-10-CM

## 2024-06-03 RX ORDER — ROSUVASTATIN CALCIUM 40 MG/1
40 TABLET, COATED ORAL DAILY
Qty: 90 TABLET | Refills: 0 | Status: SHIPPED | OUTPATIENT
Start: 2024-06-03

## 2024-06-03 RX ORDER — OMEGA-3-ACID ETHYL ESTERS 1 G/1
1 CAPSULE, LIQUID FILLED ORAL 2 TIMES DAILY
Qty: 180 CAPSULE | Refills: 0 | Status: SHIPPED | OUTPATIENT
Start: 2024-06-03

## 2024-06-03 RX ORDER — AMLODIPINE BESYLATE 10 MG/1
10 TABLET ORAL DAILY
Qty: 90 TABLET | Refills: 0 | Status: SHIPPED | OUTPATIENT
Start: 2024-06-03

## 2024-06-03 RX ORDER — POTASSIUM CHLORIDE 20 MEQ/1
TABLET, EXTENDED RELEASE ORAL
Qty: 180 TABLET | Refills: 0 | Status: SHIPPED | OUTPATIENT
Start: 2024-06-03

## 2024-06-06 ENCOUNTER — OFFICE VISIT (OUTPATIENT)
Dept: FAMILY MEDICINE CLINIC | Facility: CLINIC | Age: 59
End: 2024-06-06
Payer: COMMERCIAL

## 2024-06-06 VITALS
HEIGHT: 74 IN | OXYGEN SATURATION: 97 % | BODY MASS INDEX: 26.51 KG/M2 | DIASTOLIC BLOOD PRESSURE: 83 MMHG | SYSTOLIC BLOOD PRESSURE: 144 MMHG | TEMPERATURE: 97.6 F | HEART RATE: 69 BPM | RESPIRATION RATE: 18 BRPM | WEIGHT: 206.6 LBS

## 2024-06-06 DIAGNOSIS — Z00.00 ENCOUNTER FOR MEDICAL EXAMINATION TO ESTABLISH CARE: Primary | ICD-10-CM

## 2024-06-06 DIAGNOSIS — I10 BENIGN HYPERTENSION: ICD-10-CM

## 2024-06-06 DIAGNOSIS — Z23 NEED FOR TDAP VACCINATION: ICD-10-CM

## 2024-06-06 DIAGNOSIS — E78.2 MIXED HYPERLIPIDEMIA: ICD-10-CM

## 2024-06-06 DIAGNOSIS — Z12.5 SCREENING PSA (PROSTATE SPECIFIC ANTIGEN): ICD-10-CM

## 2024-06-06 DIAGNOSIS — E11.65 TYPE 2 DIABETES MELLITUS WITH HYPERGLYCEMIA, WITHOUT LONG-TERM CURRENT USE OF INSULIN: ICD-10-CM

## 2024-06-06 DIAGNOSIS — Z71.3 DIETARY COUNSELING: ICD-10-CM

## 2024-06-06 PROCEDURE — 90715 TDAP VACCINE 7 YRS/> IM: CPT | Performed by: NURSE PRACTITIONER

## 2024-06-06 PROCEDURE — 99214 OFFICE O/P EST MOD 30 MIN: CPT | Performed by: NURSE PRACTITIONER

## 2024-06-06 PROCEDURE — 90471 IMMUNIZATION ADMIN: CPT | Performed by: NURSE PRACTITIONER

## 2024-06-06 NOTE — PROGRESS NOTES
Gustavo Singh  [id number]  1965  male     06/06/2024      Chief Complaint  Establish Care (Patient is establishing care today, he is  off boarding from Dr. Marcos)    History of Present Illness  58-year-old male patient presents today to establish care.  States he used to see Dr. Marcos.  Patient denies any complaints or concerns today.  Denies headache, lightheadedness, dizziness, numbness, tingling, cough, shortness of breath, chest pain, palpitations, leg swelling, abdominal pain, NVD, dysuria, rash.  States he sees [facility].  Status post coronary stent placement in 2018 patient reports.  States he is not fasting today but agrees to come back in for routine fasting labs next week.  States he has not had his PSA level checked that he ever recalls.  Agrees to check PSA on those labs.  Patient states his average fasting glucose at home has been averaging around 200 recently.  Patient identifies need of improvement in avoiding sweets/soda.  Patient agrees to update Tdap vaccine today as it has been greater than 10 years since last Tdap vaccine.      BMI is >= 25 and <30. (Overweight) The following options were offered after discussion;: exercise counseling/recommendations and nutrition counseling/recommendations       Review of Systems   Constitutional: Negative.    HENT: Negative.     Eyes: Negative.    Respiratory: Negative.     Cardiovascular: Negative.    Gastrointestinal: Negative.    Endocrine: Negative.    Genitourinary: Negative.    Musculoskeletal: Negative.    Skin: Negative.    Allergic/Immunologic: Negative.    Neurological: Negative.    Hematological: Negative.    Psychiatric/Behavioral: Negative.         Past Medical History:   Diagnosis Date   • Allergies     eggs, corn, green beans- immunotherapy in childhood   • Diabetes mellitus    • Hyperlipidemia    • Hypertension    • CHASITY (obstructive sleep apnea)    • Tobacco chew use     quit in 1985   • Wears dentures     upper   • Wears glasses   "      Past Surgical History:   Procedure Laterality Date   • CARDIAC CATHETERIZATION  10/2018    CHD, 1 stent placement   • CIRCUMCISION  08/1964   • COLONOSCOPY  1985   • HEEL SPUR SURGERY Left 2002   • TONSILLECTOMY AND ADENOIDECTOMY  1981   • WISDOM TOOTH EXTRACTION         Family History   Problem Relation Age of Onset   • Hypertension Mother    • Hyperlipidemia Mother    • Liver cancer Mother    • Depression Father    • Alcohol abuse Father    • Hypertension Sister    • Diabetes Sister    • Hypertension Sister    • Diabetes Sister    • Liver cancer Sister    • No Known Problems Daughter    • Heart attack Son    • No Known Problems Maternal Grandmother    • Stroke Maternal Grandfather    • Stroke Paternal Grandmother    • No Known Problems Paternal Grandfather        Social History     Socioeconomic History   • Marital status:    Tobacco Use   • Smoking status: Never     Passive exposure: Never   • Smokeless tobacco: Former     Types: Chew     Quit date: 1990   Vaping Use   • Vaping status: Never Used   Substance and Sexual Activity   • Alcohol use: Not Currently   • Drug use: Not Currently   • Sexual activity: Defer        Allergies   Allergen Reactions   • Tenoretic [Atenolol-Chlorthalidone] Other (See Comments)     ED   • Egg-Derived Products Other (See Comments)     Positive allergy test   • Lisinopril Other (See Comments)     drowsy   • Penicillins Hives         Objective   Vital Signs:   /83 (BP Location: Left arm, Patient Position: Sitting, Cuff Size: Adult)   Pulse 69   Temp 97.6 °F (36.4 °C) (Oral)   Resp 18   Ht 188 cm (74\")   Wt 93.7 kg (206 lb 9.6 oz)   SpO2 97%   BMI 26.53 kg/m²       Physical Exam  Vitals and nursing note reviewed. Exam conducted with a chaperone present (Candelaria BURNETT).   Constitutional:       General: He is not in acute distress.     Appearance: Normal appearance. He is not ill-appearing, toxic-appearing or diaphoretic.   HENT:      Head: Normocephalic and " atraumatic.      Jaw: There is normal jaw occlusion.      Right Ear: Hearing and external ear normal.      Left Ear: Hearing and external ear normal.      Nose: Nose normal.      Mouth/Throat:      Lips: Pink.   Eyes:      General: Lids are normal. Vision grossly intact. Gaze aligned appropriately.      Extraocular Movements: Extraocular movements intact.      Conjunctiva/sclera: Conjunctivae normal.      Pupils: Pupils are equal, round, and reactive to light.   Cardiovascular:      Rate and Rhythm: Normal rate and regular rhythm.      Pulses: Normal pulses.           Carotid pulses are 2+ on the right side and 2+ on the left side.       Radial pulses are 2+ on the right side and 2+ on the left side.        Dorsalis pedis pulses are 2+ on the right side and 2+ on the left side.        Posterior tibial pulses are 2+ on the right side and 2+ on the left side.      Heart sounds: Normal heart sounds, S1 normal and S2 normal. No murmur heard.  Pulmonary:      Effort: Pulmonary effort is normal.      Breath sounds: Normal breath sounds and air entry.   Abdominal:      General: Abdomen is flat. Bowel sounds are normal. There is no distension or abdominal bruit.      Palpations: Abdomen is soft.      Tenderness: There is no abdominal tenderness.   Musculoskeletal:         General: Normal range of motion.      Cervical back: Full passive range of motion without pain, normal range of motion and neck supple.      Right lower leg: No edema.      Left lower leg: No edema.   Skin:     General: Skin is warm and dry.      Capillary Refill: Capillary refill takes less than 2 seconds.      Coloration: Skin is not pale.      Findings: No bruising, erythema or rash.   Neurological:      General: No focal deficit present.      Mental Status: He is alert and oriented to person, place, and time. Mental status is at baseline.      GCS: GCS eye subscore is 4. GCS verbal subscore is 5. GCS motor subscore is 6.      Cranial Nerves: Cranial  nerves 2-12 are intact. No cranial nerve deficit.      Sensory: Sensation is intact. No sensory deficit.      Motor: Motor function is intact. No weakness.      Coordination: Coordination is intact. Coordination normal.      Gait: Gait is intact. Gait normal.      Deep Tendon Reflexes: Reflexes normal.   Psychiatric:         Attention and Perception: Attention and perception normal.         Mood and Affect: Mood and affect normal.         Speech: Speech normal.         Behavior: Behavior normal. Behavior is cooperative.         Thought Content: Thought content normal.         Cognition and Memory: Cognition and memory normal.         Judgment: Judgment normal.               Assessment and Plan   Diagnoses and all orders for this visit:    1. Encounter for medical examination to establish care (Primary)    2. Type 2 diabetes mellitus with hyperglycemia, without long-term current use of insulin  -     metFORMIN (GLUCOPHAGE) 1000 MG tablet; Take 1 tablet by mouth 2 (Two) Times a Day With Meals.  Dispense: 180 tablet; Refill: 1  -     CBC w AUTO Differential; Future  -     Comprehensive metabolic panel; Future  -     Hemoglobin A1c; Future  -     Insulin, Fasting (PARKER); Future  -     TSH Rfx On Abnormal To Free T4; Future    3. Benign hypertension  -     Comprehensive metabolic panel; Future  -     Lipid panel; Future  -     Hemoglobin A1c; Future  -     Insulin, Fasting (PARKER); Future  -     TSH Rfx On Abnormal To Free T4; Future    4. Mixed hyperlipidemia  -     Comprehensive metabolic panel; Future  -     Lipid panel; Future    5. Need for Tdap vaccination  -     Tdap Vaccine => 6yo IM (BOOSTRIX)    6. BMI 26.0-26.9,adult  -     Comprehensive metabolic panel; Future  -     Hemoglobin A1c; Future  -     Insulin, Fasting (PARKER); Future  -     TSH Rfx On Abnormal To Free T4; Future    7. Screening PSA (prostate specific antigen)  -     PSA SCREENING; Future    8. Dietary counseling    -Establish care.    -Blood pressure  mildly elevated, asymptomatic.  Continue current antihypertensive medications.  Managed by cardiologist Roz Lester.  Last seen cardiology on February 21, 2024.  Instructed patient to follow-up with cardiology.    -Increase metformin to 1000 mg twice daily for diabetes.  Continue alogliptin benzoate.  Instructed patient to monitor his blood glucose at home daily.  Patient reports average blood glucose has been 200 at home recently.  On March 6, 2024 patient's A1c was 8.1.    -Updated Tdap vaccine today as patient states his last Tdap vaccine was greater than 10 years.    -Ordered screening PSA as I do not see one on file.    -On rosuvastatin for mixed hyperlipidemia.  Managed by cardiology.  Pending fasting labs.    -Pending routine fasting labs.    -Discussed ER red flags.  -Instructed patient to follow-up with me in 3 months for diabetes.    Follow Up   Return in about 3 months (around 9/6/2024) for Recheck.    Patient Instructions   Diabetes Mellitus and Nutrition, Adult  When you have diabetes, or diabetes mellitus, it is very important to have healthy eating habits because your blood sugar (glucose) levels are greatly affected by what you eat and drink. Eating healthy foods in the right amounts, at about the same times every day, can help you:  Manage your blood glucose.  Lower your risk of heart disease.  Improve your blood pressure.  Reach or maintain a healthy weight.  What can affect my meal plan?  Every person with diabetes is different, and each person has different needs for a meal plan. Your health care provider may recommend that you work with a dietitian to make a meal plan that is best for you. Your meal plan may vary depending on factors such as:  The calories you need.  The medicines you take.  Your weight.  Your blood glucose, blood pressure, and cholesterol levels.  Your activity level.  Other health conditions you have, such as heart or kidney disease.  How do carbohydrates affect  me?  Carbohydrates, also called carbs, affect your blood glucose level more than any other type of food. Eating carbs raises the amount of glucose in your blood.  It is important to know how many carbs you can safely have in each meal. This is different for every person. Your dietitian can help you calculate how many carbs you should have at each meal and for each snack.  How does alcohol affect me?  Alcohol can cause a decrease in blood glucose (hypoglycemia), especially if you use insulin or take certain diabetes medicines by mouth. Hypoglycemia can be a life-threatening condition. Symptoms of hypoglycemia, such as sleepiness, dizziness, and confusion, are similar to symptoms of having too much alcohol.  Do not drink alcohol if:  Your health care provider tells you not to drink.  You are pregnant, may be pregnant, or are planning to become pregnant.  If you drink alcohol:  Limit how much you have to:  0-1 drink a day for women.  0-2 drinks a day for men.  Know how much alcohol is in your drink. In the [place] one drink equals one 12 oz bottle of beer (355 mL), one 5 oz glass of wine (148 mL), or one 1½ oz glass of hard liquor (44 mL).  Keep yourself hydrated with water, diet soda, or unsweetened iced tea. Keep in mind that regular soda, juice, and other mixers may contain a lot of sugar and must be counted as carbs.  What are tips for following this plan?  Reading food labels  Start by checking the serving size on the Nutrition Facts label of packaged foods and drinks. The number of calories and the amount of carbs, fats, and other nutrients listed on the label are based on one serving of the item. Many items contain more than one serving per package.  Check the total grams (g) of carbs in one serving.  Check the number of grams of saturated fats and trans fats in one serving. Choose foods that have a low amount or none of these fats.  Check the number of milligrams (mg) of salt (sodium) in one serving. Most people  "should limit total sodium intake to less than 2,300 mg per day.  Always check the nutrition information of foods labeled as \"low-fat\" or \"nonfat.\" These foods may be higher in added sugar or refined carbs and should be avoided.  Talk to your dietitian to identify your daily goals for nutrients listed on the label.  Shopping  Avoid buying canned, pre-made, or processed foods. These foods tend to be high in fat, sodium, and added sugar.  Shop around the outside edge of the grocery store. This is where you will most often find fresh fruits and vegetables, bulk grains, fresh meats, and fresh dairy products.  Cooking  Use low-heat cooking methods, such as baking, instead of high-heat cooking methods, such as deep frying.  Cook using healthy oils, such as olive, canola, or sunflower oil.  Avoid cooking with butter, cream, or high-fat meats.  Meal planning  Eat meals and snacks regularly, preferably at the same times every day. Avoid going long periods of time without eating.  Eat foods that are high in fiber, such as fresh fruits, vegetables, beans, and whole grains.  Eat 4-6 oz (112-168 g) of lean protein each day, such as lean meat, chicken, fish, eggs, or tofu. One ounce (oz) (28 g) of lean protein is equal to:  1 oz (28 g) of meat, chicken, or fish.  1 egg.  ¼ cup (62 g) of tofu.  Eat some foods each day that contain healthy fats, such as avocado, nuts, seeds, and fish.  What foods should I eat?  Fruits  Berries. Apples. Oranges. Peaches. Apricots. Plums. Grapes. Mangoes. Papayas. Pomegranates. Kiwi. Cherries.  Vegetables  Leafy greens, including lettuce, spinach, kale, chard, medardo greens, mustard greens, and cabbage. Beets. Cauliflower. Broccoli. Carrots. Green beans. Tomatoes. Peppers. Onions. Cucumbers. [place] sprouts.  Grains  Whole grains, such as whole-wheat or whole-grain bread, crackers, tortillas, cereal, and pasta. Unsweetened oatmeal. Quinoa. Brown or wild rice.  Meats and other proteins  Seafood. " Poultry without skin. Lean cuts of poultry and beef. Tofu. Nuts. Seeds.  Dairy  Low-fat or fat-free dairy products such as milk, yogurt, and cheese.  The items listed above may not be a complete list of foods and beverages you can eat and drink. Contact a dietitian for more information.  What foods should I avoid?  Fruits  Fruits canned with syrup.  Vegetables  Canned vegetables. Frozen vegetables with butter or cream sauce.  Grains  Refined white flour and flour products such as bread, pasta, snack foods, and cereals. Avoid all processed foods.  Meats and other proteins  Fatty cuts of meat. Poultry with skin. Breaded or fried meats. Processed meat. Avoid saturated fats.  Dairy  Full-fat yogurt, cheese, or milk.  Beverages  Sweetened drinks, such as soda or iced tea.  The items listed above may not be a complete list of foods and beverages you should avoid. Contact a dietitian for more information.  Questions to ask a health care provider  Do I need to meet with a certified diabetes care and ?  Do I need to meet with a dietitian?  What number can I call if I have questions?  When are the best times to check my blood glucose?  Where to find more information:  American Diabetes Association: diabetes.org  Academy of Nutrition and Dietetics: eatright.org  National [place] of Diabetes and Digestive and Kidney Diseases: niddk.nih.gov  Association of Diabetes Care & Education Specialists: diabeteseducator.org  Summary  It is important to have healthy eating habits because your blood sugar (glucose) levels are greatly affected by what you eat and drink. It is important to use alcohol carefully.  A healthy meal plan will help you manage your blood glucose and lower your risk of heart disease.  Your health care provider may recommend that you work with a dietitian to make a meal plan that is best for you.  This information is not intended to replace advice given to you by your health care provider. Make  sure you discuss any questions you have with your health care provider.  Document Revised: 07/21/2021 Document Reviewed: 07/21/2021  Elsevier Patient Education © 2022 Elsevier Inc.

## 2024-06-06 NOTE — PATIENT INSTRUCTIONS
Diabetes Mellitus and Nutrition, Adult  When you have diabetes, or diabetes mellitus, it is very important to have healthy eating habits because your blood sugar (glucose) levels are greatly affected by what you eat and drink. Eating healthy foods in the right amounts, at about the same times every day, can help you:  Manage your blood glucose.  Lower your risk of heart disease.  Improve your blood pressure.  Reach or maintain a healthy weight.  What can affect my meal plan?  Every person with diabetes is different, and each person has different needs for a meal plan. Your health care provider may recommend that you work with a dietitian to make a meal plan that is best for you. Your meal plan may vary depending on factors such as:  The calories you need.  The medicines you take.  Your weight.  Your blood glucose, blood pressure, and cholesterol levels.  Your activity level.  Other health conditions you have, such as heart or kidney disease.  How do carbohydrates affect me?  Carbohydrates, also called carbs, affect your blood glucose level more than any other type of food. Eating carbs raises the amount of glucose in your blood.  It is important to know how many carbs you can safely have in each meal. This is different for every person. Your dietitian can help you calculate how many carbs you should have at each meal and for each snack.  How does alcohol affect me?  Alcohol can cause a decrease in blood glucose (hypoglycemia), especially if you use insulin or take certain diabetes medicines by mouth. Hypoglycemia can be a life-threatening condition. Symptoms of hypoglycemia, such as sleepiness, dizziness, and confusion, are similar to symptoms of having too much alcohol.  Do not drink alcohol if:  Your health care provider tells you not to drink.  You are pregnant, may be pregnant, or are planning to become pregnant.  If you drink alcohol:  Limit how much you have to:  0-1 drink a day for women.  0-2 drinks a day  "for men.  Know how much alcohol is in your drink. In the [place] one drink equals one 12 oz bottle of beer (355 mL), one 5 oz glass of wine (148 mL), or one 1½ oz glass of hard liquor (44 mL).  Keep yourself hydrated with water, diet soda, or unsweetened iced tea. Keep in mind that regular soda, juice, and other mixers may contain a lot of sugar and must be counted as carbs.  What are tips for following this plan?  Reading food labels  Start by checking the serving size on the Nutrition Facts label of packaged foods and drinks. The number of calories and the amount of carbs, fats, and other nutrients listed on the label are based on one serving of the item. Many items contain more than one serving per package.  Check the total grams (g) of carbs in one serving.  Check the number of grams of saturated fats and trans fats in one serving. Choose foods that have a low amount or none of these fats.  Check the number of milligrams (mg) of salt (sodium) in one serving. Most people should limit total sodium intake to less than 2,300 mg per day.  Always check the nutrition information of foods labeled as \"low-fat\" or \"nonfat.\" These foods may be higher in added sugar or refined carbs and should be avoided.  Talk to your dietitian to identify your daily goals for nutrients listed on the label.  Shopping  Avoid buying canned, pre-made, or processed foods. These foods tend to be high in fat, sodium, and added sugar.  Shop around the outside edge of the grocery store. This is where you will most often find fresh fruits and vegetables, bulk grains, fresh meats, and fresh dairy products.  Cooking  Use low-heat cooking methods, such as baking, instead of high-heat cooking methods, such as deep frying.  Cook using healthy oils, such as olive, canola, or sunflower oil.  Avoid cooking with butter, cream, or high-fat meats.  Meal planning  Eat meals and snacks regularly, preferably at the same times every day. Avoid going long periods of " time without eating.  Eat foods that are high in fiber, such as fresh fruits, vegetables, beans, and whole grains.  Eat 4-6 oz (112-168 g) of lean protein each day, such as lean meat, chicken, fish, eggs, or tofu. One ounce (oz) (28 g) of lean protein is equal to:  1 oz (28 g) of meat, chicken, or fish.  1 egg.  ¼ cup (62 g) of tofu.  Eat some foods each day that contain healthy fats, such as avocado, nuts, seeds, and fish.  What foods should I eat?  Fruits  Berries. Apples. Oranges. Peaches. Apricots. Plums. Grapes. Mangoes. Papayas. Pomegranates. Kiwi. Cherries.  Vegetables  Leafy greens, including lettuce, spinach, kale, chard, medardo greens, mustard greens, and cabbage. Beets. Cauliflower. Broccoli. Carrots. Green beans. Tomatoes. Peppers. Onions. Cucumbers. [place] sprouts.  Grains  Whole grains, such as whole-wheat or whole-grain bread, crackers, tortillas, cereal, and pasta. Unsweetened oatmeal. Quinoa. Brown or wild rice.  Meats and other proteins  Seafood. Poultry without skin. Lean cuts of poultry and beef. Tofu. Nuts. Seeds.  Dairy  Low-fat or fat-free dairy products such as milk, yogurt, and cheese.  The items listed above may not be a complete list of foods and beverages you can eat and drink. Contact a dietitian for more information.  What foods should I avoid?  Fruits  Fruits canned with syrup.  Vegetables  Canned vegetables. Frozen vegetables with butter or cream sauce.  Grains  Refined white flour and flour products such as bread, pasta, snack foods, and cereals. Avoid all processed foods.  Meats and other proteins  Fatty cuts of meat. Poultry with skin. Breaded or fried meats. Processed meat. Avoid saturated fats.  Dairy  Full-fat yogurt, cheese, or milk.  Beverages  Sweetened drinks, such as soda or iced tea.  The items listed above may not be a complete list of foods and beverages you should avoid. Contact a dietitian for more information.  Questions to ask a health care provider  Do I need to  meet with a certified diabetes care and ?  Do I need to meet with a dietitian?  What number can I call if I have questions?  When are the best times to check my blood glucose?  Where to find more information:  American Diabetes Association: diabetes.org  Academy of Nutrition and Dietetics: eatright.org  National [place] of Diabetes and Digestive and Kidney Diseases: niddk.nih.gov  Association of Diabetes Care & Education Specialists: diabeteseducator.org  Summary  It is important to have healthy eating habits because your blood sugar (glucose) levels are greatly affected by what you eat and drink. It is important to use alcohol carefully.  A healthy meal plan will help you manage your blood glucose and lower your risk of heart disease.  Your health care provider may recommend that you work with a dietitian to make a meal plan that is best for you.  This information is not intended to replace advice given to you by your health care provider. Make sure you discuss any questions you have with your health care provider.  Document Revised: 07/21/2021 Document Reviewed: 07/21/2021  Elsevier Patient Education © 2022 Elsevier Inc.

## 2024-06-12 ENCOUNTER — CLINICAL SUPPORT (OUTPATIENT)
Dept: FAMILY MEDICINE CLINIC | Facility: CLINIC | Age: 59
End: 2024-06-12
Payer: COMMERCIAL

## 2024-06-12 DIAGNOSIS — Z12.5 SCREENING PSA (PROSTATE SPECIFIC ANTIGEN): ICD-10-CM

## 2024-06-12 DIAGNOSIS — E78.2 MIXED HYPERLIPIDEMIA: ICD-10-CM

## 2024-06-12 DIAGNOSIS — I10 BENIGN HYPERTENSION: ICD-10-CM

## 2024-06-12 DIAGNOSIS — E11.65 TYPE 2 DIABETES MELLITUS WITH HYPERGLYCEMIA, WITHOUT LONG-TERM CURRENT USE OF INSULIN: ICD-10-CM

## 2024-06-12 LAB
INSULIN SERPL-ACNC: 22.5 UU/ML (ref 2–23)
TSH SERPL DL<=0.05 MIU/L-ACNC: 2.2 UIU/ML (ref 0.27–4.2)

## 2024-06-12 PROCEDURE — G0103 PSA SCREENING: HCPCS | Performed by: NURSE PRACTITIONER

## 2024-06-12 PROCEDURE — 80053 COMPREHEN METABOLIC PANEL: CPT | Performed by: NURSE PRACTITIONER

## 2024-06-12 PROCEDURE — 36415 COLL VENOUS BLD VENIPUNCTURE: CPT | Performed by: NURSE PRACTITIONER

## 2024-06-12 PROCEDURE — 84443 ASSAY THYROID STIM HORMONE: CPT | Performed by: NURSE PRACTITIONER

## 2024-06-12 PROCEDURE — 83525 ASSAY OF INSULIN: CPT | Performed by: NURSE PRACTITIONER

## 2024-06-12 PROCEDURE — 83036 HEMOGLOBIN GLYCOSYLATED A1C: CPT | Performed by: NURSE PRACTITIONER

## 2024-06-12 PROCEDURE — 85025 COMPLETE CBC W/AUTO DIFF WBC: CPT | Performed by: NURSE PRACTITIONER

## 2024-06-12 PROCEDURE — 80061 LIPID PANEL: CPT | Performed by: NURSE PRACTITIONER

## 2024-06-12 NOTE — PROGRESS NOTES
Venipuncture Blood Specimen Collection  Venipuncture performed in rt arm via venipuncture by Noemi Lee with good hemostasis. Patient tolerated the procedure well without complications.   06/12/24   Noemi Lee

## 2024-06-13 LAB
ALBUMIN SERPL-MCNC: 4.3 G/DL (ref 3.5–5.2)
ALBUMIN/GLOB SERPL: 2 G/DL
ALP SERPL-CCNC: 53 U/L (ref 39–117)
ALT SERPL W P-5'-P-CCNC: 34 U/L (ref 1–41)
ANION GAP SERPL CALCULATED.3IONS-SCNC: 11.4 MMOL/L (ref 5–15)
AST SERPL-CCNC: 29 U/L (ref 1–40)
BASOPHILS # BLD AUTO: 0.06 10*3/MM3 (ref 0–0.2)
BASOPHILS NFR BLD AUTO: 1.1 % (ref 0–1.5)
BILIRUB SERPL-MCNC: 0.5 MG/DL (ref 0–1.2)
BUN SERPL-MCNC: 12 MG/DL (ref 6–20)
BUN/CREAT SERPL: 16.4 (ref 7–25)
CALCIUM SPEC-SCNC: 9.1 MG/DL (ref 8.6–10.5)
CHLORIDE SERPL-SCNC: 102 MMOL/L (ref 98–107)
CHOLEST SERPL-MCNC: 96 MG/DL (ref 0–200)
CO2 SERPL-SCNC: 26.6 MMOL/L (ref 22–29)
CREAT SERPL-MCNC: 0.73 MG/DL (ref 0.76–1.27)
DEPRECATED RDW RBC AUTO: 44.7 FL (ref 37–54)
EGFRCR SERPLBLD CKD-EPI 2021: 104.8 ML/MIN/1.73
EOSINOPHIL # BLD AUTO: 0.11 10*3/MM3 (ref 0–0.4)
EOSINOPHIL NFR BLD AUTO: 2.1 % (ref 0.3–6.2)
ERYTHROCYTE [DISTWIDTH] IN BLOOD BY AUTOMATED COUNT: 13.4 % (ref 12.3–15.4)
GLOBULIN UR ELPH-MCNC: 2.2 GM/DL
GLUCOSE SERPL-MCNC: 141 MG/DL (ref 65–99)
HBA1C MFR BLD: 7.5 % (ref 4.8–5.6)
HCT VFR BLD AUTO: 42.9 % (ref 37.5–51)
HDLC SERPL-MCNC: 38 MG/DL (ref 40–60)
HGB BLD-MCNC: 14.4 G/DL (ref 13–17.7)
IMM GRANULOCYTES # BLD AUTO: 0.01 10*3/MM3 (ref 0–0.05)
IMM GRANULOCYTES NFR BLD AUTO: 0.2 % (ref 0–0.5)
LDLC SERPL CALC-MCNC: 35 MG/DL (ref 0–100)
LDLC/HDLC SERPL: 0.84 {RATIO}
LYMPHOCYTES # BLD AUTO: 1.69 10*3/MM3 (ref 0.7–3.1)
LYMPHOCYTES NFR BLD AUTO: 31.8 % (ref 19.6–45.3)
MCH RBC QN AUTO: 30.4 PG (ref 26.6–33)
MCHC RBC AUTO-ENTMCNC: 33.6 G/DL (ref 31.5–35.7)
MCV RBC AUTO: 90.7 FL (ref 79–97)
MONOCYTES # BLD AUTO: 0.53 10*3/MM3 (ref 0.1–0.9)
MONOCYTES NFR BLD AUTO: 10 % (ref 5–12)
NEUTROPHILS NFR BLD AUTO: 2.91 10*3/MM3 (ref 1.7–7)
NEUTROPHILS NFR BLD AUTO: 54.8 % (ref 42.7–76)
PLATELET # BLD AUTO: 126 10*3/MM3 (ref 140–450)
PMV BLD AUTO: 11.5 FL (ref 6–12)
POTASSIUM SERPL-SCNC: 3.4 MMOL/L (ref 3.5–5.2)
PROT SERPL-MCNC: 6.5 G/DL (ref 6–8.5)
PSA SERPL-MCNC: 0.42 NG/ML (ref 0–4)
RBC # BLD AUTO: 4.73 10*6/MM3 (ref 4.14–5.8)
SODIUM SERPL-SCNC: 140 MMOL/L (ref 136–145)
TRIGL SERPL-MCNC: 130 MG/DL (ref 0–150)
VLDLC SERPL-MCNC: 23 MG/DL (ref 5–40)
WBC NRBC COR # BLD AUTO: 5.31 10*3/MM3 (ref 3.4–10.8)

## 2024-06-14 ENCOUNTER — TELEPHONE (OUTPATIENT)
Dept: FAMILY MEDICINE CLINIC | Facility: CLINIC | Age: 59
End: 2024-06-14
Payer: COMMERCIAL

## 2024-06-14 NOTE — TELEPHONE ENCOUNTER
I called and notified patient of lab results.      A1c has improved to 7.5 whereas it was 8.1 3 months ago.  We had just increased patient's metformin at most recent appointment a few days ago.  Patient to continue current dose of metformin.  Instructed patient to call my office if his blood glucose at home is less than 80 or greater than 200.    Cholesterol, PSA, thyroid labs, fasting insulin all are within normal limits.    Mildly low platelets at 126 but stable.  Patient agreed on rechecking this at his upcoming appointment in September.  Discussed potential hematology referral if they remain mildly low.  No med changes at this time.  Patient verbalized understanding.

## 2024-06-22 DIAGNOSIS — E11.9 TYPE 2 DIABETES MELLITUS WITHOUT COMPLICATION, WITHOUT LONG-TERM CURRENT USE OF INSULIN: ICD-10-CM

## 2024-06-24 RX ORDER — ALOGLIPTIN 25 MG/1
TABLET, FILM COATED ORAL
Qty: 90 TABLET | Refills: 0 | Status: SHIPPED | OUTPATIENT
Start: 2024-06-24

## 2024-07-29 ENCOUNTER — TELEPHONE (OUTPATIENT)
Dept: FAMILY MEDICINE CLINIC | Facility: CLINIC | Age: 59
End: 2024-07-29
Payer: COMMERCIAL

## 2024-07-29 DIAGNOSIS — S39.012A LOW BACK STRAIN, INITIAL ENCOUNTER: Primary | ICD-10-CM

## 2024-07-29 DIAGNOSIS — E08.42 DIABETIC POLYNEUROPATHY ASSOCIATED WITH DIABETES MELLITUS DUE TO UNDERLYING CONDITION: ICD-10-CM

## 2024-07-29 RX ORDER — MELOXICAM 7.5 MG/1
7.5 TABLET ORAL DAILY
Qty: 10 TABLET | Refills: 0 | Status: SHIPPED | OUTPATIENT
Start: 2024-07-29

## 2024-07-29 RX ORDER — GABAPENTIN 400 MG/1
CAPSULE ORAL
Qty: 180 CAPSULE | Refills: 0 | Status: SHIPPED | OUTPATIENT
Start: 2024-07-29

## 2024-07-29 RX ORDER — CYCLOBENZAPRINE HCL 5 MG
5 TABLET ORAL 2 TIMES DAILY PRN
Qty: 20 TABLET | Refills: 0 | Status: SHIPPED | OUTPATIENT
Start: 2024-07-29

## 2024-07-29 NOTE — TELEPHONE ENCOUNTER
Caller: Gustavo Singh    Relationship: Self    Best call back number: [phone]     What medication are you requesting: STEROID, MUSCLE RELAXER    What are your current symptoms: LOWER BACK PAIN    How long have you been experiencing symptoms: 2 DAYS    Have you had these symptoms before:    [x] Yes  [] No    Have you been treated for these symptoms before:   [x] Yes  [] No    If a prescription is needed, what is your preferred pharmacy and phone number: [facility] - [place], [place] - [address] [place] [address] [phone] [facility] [phone]      Additional notes: PATIENT HAS HAD THIS PAIN BEFORE AND A STEROID AND MUSCLE RELAXER WERE CALLED IN FOR HIM. PLEASE CALL TO ADVISE IF HE IS NEEDING TO BE SEEN FIRST.

## 2024-07-29 NOTE — TELEPHONE ENCOUNTER
Patient has been advised. Verbalized understanding. Discussed why a steroid was not sent. I explained it is due to his diabetes.

## 2024-07-29 NOTE — TELEPHONE ENCOUNTER
Rx Refill Note  Requested Prescriptions     Pending Prescriptions Disp Refills    gabapentin (NEURONTIN) 400 MG capsule [Pharmacy Med Name: gabapentin 400 mg capsule] 180 capsule 1     Sig: TAKE ONE CAPSULE BY MOUTH TWICE DAILY      Last office visit with prescribing clinician: 3/6/2024   Last telemedicine visit with prescribing clinician: Visit date not found   Next office visit with prescribing clinician: Visit date not found     UDS  None on file  CSA   None on file    INSPECT - SCAN - INSPECT/ [facility]/ 07/29/2024 (07/29/2024)     Hilda Clements MA  07/29/24, 09:02 EDT

## 2024-08-20 DIAGNOSIS — I10 BENIGN HYPERTENSION: ICD-10-CM

## 2024-08-20 RX ORDER — HYDROCHLOROTHIAZIDE 25 MG/1
TABLET ORAL
Qty: 90 TABLET | Refills: 0 | Status: SHIPPED | OUTPATIENT
Start: 2024-08-20

## 2024-08-20 NOTE — TELEPHONE ENCOUNTER
Rx Refill Note  Requested Prescriptions     Pending Prescriptions Disp Refills    hydroCHLOROthiazide 25 MG tablet [Pharmacy Med Name: hydrochlorothiazide 25 mg tablet] 90 tablet 0     Sig: TAKE ONE TABLET BY MOUTH EVERY MORNING      Last office visit with prescribing clinician: 6/6/2024   Last telemedicine visit with prescribing clinician: Visit date not found   Next office visit with prescribing clinician: 9/10/2024                         Would you like a call back once the refill request has been completed: [] Yes [] No    If the office needs to give you a call back, can they leave a voicemail: [] Yes [] No    Terra Zimmer MA  08/20/24, 09:14 EDT

## 2024-08-27 DIAGNOSIS — E87.6 HYPOKALEMIA: ICD-10-CM

## 2024-08-27 DIAGNOSIS — J30.1 ALLERGIC RHINITIS DUE TO POLLEN: ICD-10-CM

## 2024-08-27 RX ORDER — MONTELUKAST SODIUM 10 MG/1
TABLET ORAL
Qty: 90 TABLET | Refills: 3 | Status: SHIPPED | OUTPATIENT
Start: 2024-08-27

## 2024-08-27 RX ORDER — POTASSIUM CHLORIDE 1500 MG/1
TABLET, EXTENDED RELEASE ORAL
Qty: 180 TABLET | Refills: 0 | Status: SHIPPED | OUTPATIENT
Start: 2024-08-27

## 2024-08-27 NOTE — TELEPHONE ENCOUNTER
Rx Refill Note  Requested Prescriptions     Pending Prescriptions Disp Refills    potassium chloride (KLOR-CON M20) 20 MEQ CR tablet [Pharmacy Med Name: potassium chloride ER 20 mEq tablet,extended release(part/cryst)] 180 tablet 0     Sig: TAKE ONE TABLET BY MOUTH TWICE DAILY      Last office visit with prescribing clinician: 6/6/2024   Last telemedicine visit with prescribing clinician: Visit date not found   Next office visit with prescribing clinician: 9/10/2024                         Would you like a call back once the refill request has been completed: [] Yes [] No    If the office needs to give you a call back, can they leave a voicemail: [] Yes [] No    Hilda Clements MA  08/27/24, 08:34 EDT

## 2024-09-03 DIAGNOSIS — E78.2 MIXED HYPERLIPIDEMIA: ICD-10-CM

## 2024-09-03 DIAGNOSIS — I10 ESSENTIAL (PRIMARY) HYPERTENSION: ICD-10-CM

## 2024-09-03 DIAGNOSIS — E78.1 HYPERTRIGLYCERIDEMIA: ICD-10-CM

## 2024-09-03 RX ORDER — OMEGA-3-ACID ETHYL ESTERS 1 G/1
1 CAPSULE, LIQUID FILLED ORAL 2 TIMES DAILY
Qty: 180 CAPSULE | Refills: 0 | Status: SHIPPED | OUTPATIENT
Start: 2024-09-03

## 2024-09-03 RX ORDER — ROSUVASTATIN CALCIUM 40 MG/1
40 TABLET, COATED ORAL DAILY
Qty: 90 TABLET | Refills: 0 | Status: SHIPPED | OUTPATIENT
Start: 2024-09-03

## 2024-09-03 RX ORDER — AMLODIPINE BESYLATE 10 MG/1
10 TABLET ORAL DAILY
Qty: 90 TABLET | Refills: 0 | Status: SHIPPED | OUTPATIENT
Start: 2024-09-03

## 2024-09-06 ENCOUNTER — OFFICE VISIT (OUTPATIENT)
Dept: FAMILY MEDICINE CLINIC | Facility: CLINIC | Age: 59
End: 2024-09-06
Payer: COMMERCIAL

## 2024-09-06 VITALS
RESPIRATION RATE: 16 BRPM | TEMPERATURE: 98.6 F | WEIGHT: 206.4 LBS | BODY MASS INDEX: 26.49 KG/M2 | HEART RATE: 79 BPM | OXYGEN SATURATION: 95 % | SYSTOLIC BLOOD PRESSURE: 137 MMHG | DIASTOLIC BLOOD PRESSURE: 76 MMHG | HEIGHT: 74 IN

## 2024-09-06 DIAGNOSIS — E11.9 ENCOUNTER FOR DIABETIC FOOT EXAM: ICD-10-CM

## 2024-09-06 DIAGNOSIS — E11.65 TYPE 2 DIABETES MELLITUS WITH HYPERGLYCEMIA, WITHOUT LONG-TERM CURRENT USE OF INSULIN: Primary | ICD-10-CM

## 2024-09-06 DIAGNOSIS — D69.1 ABNORMAL PLATELET FUNCTION TEST: ICD-10-CM

## 2024-09-06 DIAGNOSIS — E87.6 HYPOKALEMIA: ICD-10-CM

## 2024-09-06 LAB
BASOPHILS # BLD AUTO: 0.06 10*3/MM3 (ref 0–0.2)
BASOPHILS NFR BLD AUTO: 0.9 % (ref 0–1.5)
DEPRECATED RDW RBC AUTO: 43.2 FL (ref 37–54)
EOSINOPHIL # BLD AUTO: 0.08 10*3/MM3 (ref 0–0.4)
EOSINOPHIL NFR BLD AUTO: 1.2 % (ref 0.3–6.2)
ERYTHROCYTE [DISTWIDTH] IN BLOOD BY AUTOMATED COUNT: 13.4 % (ref 12.3–15.4)
HCT VFR BLD AUTO: 44.5 % (ref 37.5–51)
HGB BLD-MCNC: 15.6 G/DL (ref 13–17.7)
IMM GRANULOCYTES # BLD AUTO: 0.03 10*3/MM3 (ref 0–0.05)
IMM GRANULOCYTES NFR BLD AUTO: 0.5 % (ref 0–0.5)
LYMPHOCYTES # BLD AUTO: 1.25 10*3/MM3 (ref 0.7–3.1)
LYMPHOCYTES NFR BLD AUTO: 19.4 % (ref 19.6–45.3)
MCH RBC QN AUTO: 31.3 PG (ref 26.6–33)
MCHC RBC AUTO-ENTMCNC: 35.1 G/DL (ref 31.5–35.7)
MCV RBC AUTO: 89.4 FL (ref 79–97)
MONOCYTES # BLD AUTO: 0.68 10*3/MM3 (ref 0.1–0.9)
MONOCYTES NFR BLD AUTO: 10.6 % (ref 5–12)
NEUTROPHILS NFR BLD AUTO: 4.33 10*3/MM3 (ref 1.7–7)
NEUTROPHILS NFR BLD AUTO: 67.4 % (ref 42.7–76)
NRBC BLD AUTO-RTO: 0 /100 WBC (ref 0–0.2)
PLATELET # BLD AUTO: 122 10*3/MM3 (ref 140–450)
PMV BLD AUTO: 10.5 FL (ref 6–12)
RBC # BLD AUTO: 4.98 10*6/MM3 (ref 4.14–5.8)
WBC NRBC COR # BLD AUTO: 6.43 10*3/MM3 (ref 3.4–10.8)

## 2024-09-06 PROCEDURE — 99214 OFFICE O/P EST MOD 30 MIN: CPT | Performed by: NURSE PRACTITIONER

## 2024-09-06 PROCEDURE — 82043 UR ALBUMIN QUANTITATIVE: CPT | Performed by: NURSE PRACTITIONER

## 2024-09-06 PROCEDURE — 80048 BASIC METABOLIC PNL TOTAL CA: CPT | Performed by: NURSE PRACTITIONER

## 2024-09-06 PROCEDURE — 36415 COLL VENOUS BLD VENIPUNCTURE: CPT | Performed by: NURSE PRACTITIONER

## 2024-09-06 PROCEDURE — 85025 COMPLETE CBC W/AUTO DIFF WBC: CPT | Performed by: NURSE PRACTITIONER

## 2024-09-06 RX ORDER — PIOGLITAZONEHYDROCHLORIDE 15 MG/1
15 TABLET ORAL DAILY
Qty: 30 TABLET | Refills: 2 | Status: SHIPPED | OUTPATIENT
Start: 2024-09-06

## 2024-09-06 NOTE — PROGRESS NOTES
Venipuncture Blood Specimen Collection  Venipuncture performed in LT ARM VIA VENIPUNCTURE by Noemi Lee with good hemostasis. Patient tolerated the procedure well without complications.   09/06/24   Noemi Lee

## 2024-09-06 NOTE — PROGRESS NOTES
Gustavo Singh  [id number]  1965  male     09/06/2024      Chief Complaint  Diabetes (Follow up. Patient states his sugar is still running high at home. )    History of Present Illness  59-year-old male patient presents today to follow-up on diabetes.  Last A1c was 7.5 in June.  Patient states his fasting glucose at home has been 200+ recently.  Patient verbalizes need of necessary dietary changes.  Currently 1000 mg metformin twice daily.  Tolerating well.  Patient states he has been on potassium now for at least a couple years due to low potassium.  Patient agrees to obtain labs today to recheck platelet count as it was mildly low when we checked labs in June.  Patient agrees to obtain diabetic foot exam today.  Denies headache, lightheadedness, dizziness, numbness, tingling, cough, shortness of breath, chest pain, palpitations, leg swelling, abdominal pain, NVD, dysuria, rash.  Diabetes                Review of Systems   Constitutional: Negative.    HENT: Negative.     Eyes: Negative.    Respiratory: Negative.     Cardiovascular: Negative.    Gastrointestinal: Negative.    Endocrine: Negative.    Genitourinary: Negative.    Musculoskeletal: Negative.    Skin: Negative.    Allergic/Immunologic: Negative.    Neurological: Negative.    Hematological: Negative.    Psychiatric/Behavioral: Negative.         Past Medical History:   Diagnosis Date    Allergies     eggs, corn, green beans- immunotherapy in childhood    Diabetes mellitus     Hyperlipidemia     Hypertension     CHASITY (obstructive sleep apnea)     Tobacco chew use     quit in 1985    Wears dentures     upper    Wears glasses        Past Surgical History:   Procedure Laterality Date    CARDIAC CATHETERIZATION  10/2018    CHD, 1 stent placement    CIRCUMCISION  08/1964    COLONOSCOPY  1985    HEEL SPUR SURGERY Left 2002    TONSILLECTOMY AND ADENOIDECTOMY  1981    WISDOM TOOTH EXTRACTION         Family History   Problem Relation Age of Onset    Hypertension  "Mother     Hyperlipidemia Mother     Liver cancer Mother     Depression Father     Alcohol abuse Father     Hypertension Sister     Diabetes Sister     Hypertension Sister     Diabetes Sister     Liver cancer Sister     No Known Problems Daughter     Heart attack Son     No Known Problems Maternal Grandmother     Stroke Maternal Grandfather     Stroke Paternal Grandmother     No Known Problems Paternal Grandfather        Social History     Socioeconomic History    Marital status:    Tobacco Use    Smoking status: Never     Passive exposure: Never    Smokeless tobacco: Former     Types: Chew     Quit date: 1990   Vaping Use    Vaping status: Never Used   Substance and Sexual Activity    Alcohol use: Not Currently    Drug use: Not Currently    Sexual activity: Defer        Allergies   Allergen Reactions    Tenoretic [Atenolol-Chlorthalidone] Other (See Comments)     ED    Egg-Derived Products Other (See Comments)     Positive allergy test    Lisinopril Other (See Comments)     drowsy    Penicillins Hives         Objective   Vital Signs:   /76 (BP Location: Left arm, Patient Position: Sitting, Cuff Size: Adult)   Pulse 79   Temp 98.6 °F (37 °C) (Temporal)   Resp 16   Ht 188 cm (74\")   Wt 93.6 kg (206 lb 6.4 oz)   SpO2 95%   BMI 26.50 kg/m²       Physical Exam  Vitals and nursing note reviewed.   Constitutional:       General: He is not in acute distress.     Appearance: Normal appearance. He is not ill-appearing, toxic-appearing or diaphoretic.   HENT:      Head: Normocephalic and atraumatic.      Jaw: There is normal jaw occlusion.      Right Ear: Hearing and external ear normal.      Left Ear: Hearing and external ear normal.      Nose: Nose normal.      Mouth/Throat:      Lips: Pink.   Eyes:      General: Lids are normal. Vision grossly intact. Gaze aligned appropriately.      Extraocular Movements: Extraocular movements intact.      Conjunctiva/sclera: Conjunctivae normal.      Pupils: Pupils " are equal, round, and reactive to light.   Cardiovascular:      Rate and Rhythm: Normal rate and regular rhythm.      Pulses: Normal pulses.           Carotid pulses are 2+ on the right side and 2+ on the left side.       Radial pulses are 2+ on the right side and 2+ on the left side.        Dorsalis pedis pulses are 2+ on the right side and 2+ on the left side.        Posterior tibial pulses are 2+ on the right side and 2+ on the left side.      Heart sounds: Normal heart sounds, S1 normal and S2 normal. No murmur heard.  Pulmonary:      Effort: Pulmonary effort is normal.      Breath sounds: Normal breath sounds and air entry.   Abdominal:      General: Abdomen is flat. Bowel sounds are normal. There is no distension or abdominal bruit.      Palpations: Abdomen is soft.      Tenderness: There is no abdominal tenderness.   Musculoskeletal:         General: Normal range of motion.      Cervical back: Full passive range of motion without pain, normal range of motion and neck supple.      Right lower leg: No edema.      Left lower leg: No edema.   Feet:      Right foot:      Protective Sensation: 10 sites tested.  10 sites sensed.      Skin integrity: Skin integrity normal.      Toenail Condition: Right toenails are normal.      Left foot:      Protective Sensation: 10 sites tested.  10 sites sensed.      Skin integrity: Skin integrity normal.      Toenail Condition: Left toenails are normal.   Skin:     General: Skin is warm and dry.      Capillary Refill: Capillary refill takes less than 2 seconds.      Coloration: Skin is not pale.      Findings: No bruising, erythema or rash.   Neurological:      General: No focal deficit present.      Mental Status: He is alert and oriented to person, place, and time. Mental status is at baseline.      GCS: GCS eye subscore is 4. GCS verbal subscore is 5. GCS motor subscore is 6.      Cranial Nerves: Cranial nerves 2-12 are intact. No cranial nerve deficit.      Sensory:  Sensation is intact. No sensory deficit.      Motor: Motor function is intact. No weakness.      Coordination: Coordination is intact. Coordination normal.      Gait: Gait is intact. Gait normal.      Deep Tendon Reflexes: Reflexes normal.   Psychiatric:         Attention and Perception: Attention and perception normal.         Mood and Affect: Mood and affect normal.         Speech: Speech normal.         Behavior: Behavior normal. Behavior is cooperative.         Thought Content: Thought content normal.         Cognition and Memory: Cognition and memory normal.         Judgment: Judgment normal.                 Assessment and Plan   Diagnoses and all orders for this visit:    1. Type 2 diabetes mellitus with hyperglycemia, without long-term current use of insulin (Primary)  -     pioglitazone (Actos) 15 MG tablet; Take 1 tablet by mouth Daily.  Dispense: 30 tablet; Refill: 2  -     MicroAlbumin, Urine, Random - Urine, Clean Catch  -     Hemoglobin A1c; Future  -     Comprehensive metabolic panel; Future    2. Abnormal platelet function test  -     CBC w AUTO Differential    3. Hypokalemia  -     Basic metabolic panel    4. Encounter for diabetic foot exam    Type 2 diabetes  -Stable.  Last A1c 7.50 on June 12, 2024.  -Patient reports his fasting glucose at home has been 200+ recently.  -Adding 15 mg pioglitazone daily.  Sent to pharmacy on file.  -Continue current dose of metformin to 1000 mg twice daily.  -Instructed patient to continue monitoring glucose at home daily and call my office if his glucose is less than 70 or greater than 200.   -Pending fasting labs today.  -Performed diabetic foot exam today.    Abnormal platelet function  -Platelets 126 on June 12, 2024 labs.  -Platelets 121 on labs from 5 years ago.  -Pending CBC today.  -Discussed potential referral to hematology based on pending labs.    Hypokalemia  -On potassium.  -Pending labs.    -Discussed ER red flags.  -Pending fasting labs in 2  weeks.  -Instructed patient to follow-up with me in 3 months for diabetes.    Follow Up   Return in about 3 months (around 12/6/2024) for Recheck.    There are no Patient Instructions on file for this visit.

## 2024-09-07 LAB
ALBUMIN UR-MCNC: 1.6 MG/DL
ANION GAP SERPL CALCULATED.3IONS-SCNC: 12.1 MMOL/L (ref 5–15)
BUN SERPL-MCNC: 11 MG/DL (ref 8–23)
BUN/CREAT SERPL: 17.7 (ref 7–25)
CALCIUM SPEC-SCNC: 10.1 MG/DL (ref 8.6–10.5)
CHLORIDE SERPL-SCNC: 99 MMOL/L (ref 98–107)
CO2 SERPL-SCNC: 24.9 MMOL/L (ref 22–29)
CREAT SERPL-MCNC: 0.62 MG/DL (ref 0.76–1.27)
EGFRCR SERPLBLD CKD-EPI 2021: 109.4 ML/MIN/1.73
GLUCOSE SERPL-MCNC: 156 MG/DL (ref 65–99)
POTASSIUM SERPL-SCNC: 3.6 MMOL/L (ref 3.5–5.2)
SODIUM SERPL-SCNC: 136 MMOL/L (ref 136–145)

## 2024-09-23 ENCOUNTER — CLINICAL SUPPORT (OUTPATIENT)
Dept: FAMILY MEDICINE CLINIC | Facility: CLINIC | Age: 59
End: 2024-09-23
Payer: COMMERCIAL

## 2024-09-23 DIAGNOSIS — E11.9 TYPE 2 DIABETES MELLITUS WITHOUT COMPLICATION, WITHOUT LONG-TERM CURRENT USE OF INSULIN: ICD-10-CM

## 2024-09-23 DIAGNOSIS — E11.65 TYPE 2 DIABETES MELLITUS WITH HYPERGLYCEMIA, WITHOUT LONG-TERM CURRENT USE OF INSULIN: ICD-10-CM

## 2024-09-23 PROCEDURE — 36415 COLL VENOUS BLD VENIPUNCTURE: CPT | Performed by: NURSE PRACTITIONER

## 2024-09-23 PROCEDURE — 83036 HEMOGLOBIN GLYCOSYLATED A1C: CPT | Performed by: NURSE PRACTITIONER

## 2024-09-23 PROCEDURE — 80053 COMPREHEN METABOLIC PANEL: CPT | Performed by: NURSE PRACTITIONER

## 2024-09-23 RX ORDER — ALOGLIPTIN 25 MG/1
TABLET, FILM COATED ORAL
Qty: 90 TABLET | Refills: 0 | Status: SHIPPED | OUTPATIENT
Start: 2024-09-23

## 2024-09-24 LAB
ALBUMIN SERPL-MCNC: 4.6 G/DL (ref 3.5–5.2)
ALBUMIN/GLOB SERPL: 2 G/DL
ALP SERPL-CCNC: 60 U/L (ref 39–117)
ALT SERPL W P-5'-P-CCNC: 36 U/L (ref 1–41)
ANION GAP SERPL CALCULATED.3IONS-SCNC: 12.9 MMOL/L (ref 5–15)
AST SERPL-CCNC: 31 U/L (ref 1–40)
BILIRUB SERPL-MCNC: 0.6 MG/DL (ref 0–1.2)
BUN SERPL-MCNC: 9 MG/DL (ref 8–23)
BUN/CREAT SERPL: 12 (ref 7–25)
CALCIUM SPEC-SCNC: 9.5 MG/DL (ref 8.6–10.5)
CHLORIDE SERPL-SCNC: 99 MMOL/L (ref 98–107)
CO2 SERPL-SCNC: 26.1 MMOL/L (ref 22–29)
CREAT SERPL-MCNC: 0.75 MG/DL (ref 0.76–1.27)
EGFRCR SERPLBLD CKD-EPI 2021: 103.3 ML/MIN/1.73
GLOBULIN UR ELPH-MCNC: 2.3 GM/DL
GLUCOSE SERPL-MCNC: 172 MG/DL (ref 65–99)
HBA1C MFR BLD: 8 % (ref 4.8–5.6)
POTASSIUM SERPL-SCNC: 3.7 MMOL/L (ref 3.5–5.2)
PROT SERPL-MCNC: 6.9 G/DL (ref 6–8.5)
SODIUM SERPL-SCNC: 138 MMOL/L (ref 136–145)

## 2024-09-24 RX ORDER — PIOGLITAZONEHYDROCHLORIDE 30 MG/1
30 TABLET ORAL DAILY
Qty: 30 TABLET | Refills: 2 | Status: SHIPPED | OUTPATIENT
Start: 2024-09-24

## 2024-10-31 DIAGNOSIS — E11.65 TYPE 2 DIABETES MELLITUS WITH HYPERGLYCEMIA, WITHOUT LONG-TERM CURRENT USE OF INSULIN: ICD-10-CM

## 2024-11-18 DIAGNOSIS — E08.42 DIABETIC POLYNEUROPATHY ASSOCIATED WITH DIABETES MELLITUS DUE TO UNDERLYING CONDITION: ICD-10-CM

## 2024-11-18 RX ORDER — GABAPENTIN 400 MG/1
CAPSULE ORAL
Qty: 180 CAPSULE | Refills: 0 | Status: SHIPPED | OUTPATIENT
Start: 2024-11-18

## 2024-11-18 NOTE — TELEPHONE ENCOUNTER
Rx Refill Note  Requested Prescriptions     Pending Prescriptions Disp Refills    gabapentin (NEURONTIN) 400 MG capsule [Pharmacy Med Name: gabapentin 400 mg capsule] 180 capsule 0     Sig: TAKE ONE CAPSULE BY MOUTH TWICE DAILY      Last office visit with prescribing clinician: 9/6/2024   Last telemedicine visit with prescribing clinician: Visit date not found   Next office visit with prescribing clinician: 12/12/2024       UDS      None on file  CSA      None on file  INSPECT - SCAN - INSPECT/ [facility]/ 11/18/2024 (11/18/2024)       Hilda Clements MA  11/18/24, 11:50 EST

## 2024-11-19 NOTE — PROGRESS NOTES
HEMATOLOGY ONCOLOGY OUTPATIENT CONSULTATION       Patient name: Gustavo Singh  : 1965  MRN: [id number]  Primary Care Physician: Pratik Martinez APRN  Referring Physician: Pratik Martinez APRN  Reason For Consult:       History of Present Illness:  Patient is a 59-year-old male who has been referred to us for further evaluation and management of thrombocytopenia.  Most recent labs were reviewed and are as follows:    2024:  CBC: 6.4/15.6/44.5/122    On review of labs, it appears that patient has chronic mild thrombocytopenia going back 6 years, possibly longer.      His past medical history significant for coronary artery disease, status post PCI/stent placement, diabetes, on OHA's, hypertension and dyslipidemia.    He is currently on daily low-dose aspirin.  Denied any significant bruising or bleeding history.      He has not had a screening colonoscopy before.  Undergoing Cologuard screening with PCP.    Patient denied any history of alcohol use or smoking.  No recent infection/illness reported.        Subjective:  2024 patient presents for initial consultation today.  He reported having chronic persistent fatigue and chronic low backache for last 4-5 years.  Denied any recent weight/appetite changes.  No other symptoms reported.          Past Medical History:   Diagnosis Date    Allergies     eggs, corn, green beans- immunotherapy in childhood    Diabetes mellitus     Hyperlipidemia     Hypertension     CHASITY (obstructive sleep apnea)     Tobacco chew use     quit in     Wears dentures     upper    Wears glasses        Past Surgical History:   Procedure Laterality Date    CARDIAC CATHETERIZATION  10/2018    CHD, 1 stent placement    CIRCUMCISION  1964    COLONOSCOPY  1985    HEEL SPUR SURGERY Left     TONSILLECTOMY AND ADENOIDECTOMY  1981    WISDOM TOOTH EXTRACTION           Current Outpatient Medications:     Alogliptin Benzoate 25 MG tablet, TAKE ONE TABLET BY  MOUTH EVERY DAY, Disp: 90 tablet, Rfl: 0    amLODIPine (NORVASC) 10 MG tablet, TAKE ONE TABLET BY MOUTH DAILY, Disp: 90 tablet, Rfl: 0    ASPIRIN 81 PO, Take 81 mg by mouth Daily., Disp: , Rfl:     betamethasone dipropionate (DIPROLENE) 0.05 % ointment, Apply 1 Application topically to the appropriate area as directed 2 (Two) Times a Day., Disp: , Rfl:     cyclobenzaprine (FLEXERIL) 5 MG tablet, Take 1 tablet by mouth 2 (Two) Times a Day As Needed for Muscle Spasms., Disp: 20 tablet, Rfl: 0    fexofenadine (ALLEGRA) 180 MG tablet, Take 1 tablet by mouth As Needed., Disp: , Rfl:     gabapentin (NEURONTIN) 400 MG capsule, TAKE ONE CAPSULE BY MOUTH TWICE DAILY, Disp: 180 capsule, Rfl: 0    hydroCHLOROthiazide 25 MG tablet, TAKE ONE TABLET BY MOUTH EVERY MORNING, Disp: 90 tablet, Rfl: 0    meloxicam (Mobic) 7.5 MG tablet, Take 1 tablet by mouth Daily., Disp: 10 tablet, Rfl: 0    metFORMIN (GLUCOPHAGE) 1000 MG tablet, TAKE ONE TABLET BY MOUTH TWICE DAILY WITH MEALS, Disp: 180 tablet, Rfl: 1    montelukast (SINGULAIR) 10 MG tablet, TAKE ONE TABLET BY MOUTH IN THE EVENING AS NEEDED, Disp: 90 tablet, Rfl: 3    multivitamin with minerals tablet tablet, Take 1 tablet by mouth Daily., Disp: , Rfl:     niacin (NIASPAN) 500 MG CR tablet, Take 1 tablet by mouth Daily., Disp: , Rfl:     olmesartan (BENICAR) 40 MG tablet, Take 1 tablet by mouth Daily., Disp: , Rfl:     omega-3 acid ethyl esters (LOVAZA) 1 g capsule, TAKE ONE CAPSULE BY MOUTH TWICE DAILY, Disp: 180 capsule, Rfl: 0    pioglitazone (Actos) 30 MG tablet, Take 1 tablet by mouth Daily., Disp: 30 tablet, Rfl: 2    potassium chloride (KLOR-CON M20) 20 MEQ CR tablet, TAKE ONE TABLET BY MOUTH TWICE DAILY, Disp: 180 tablet, Rfl: 0    rosuvastatin (CRESTOR) 40 MG tablet, TAKE ONE TABLET BY MOUTH DAILY, Disp: 90 tablet, Rfl: 0    Allergies   Allergen Reactions    Tenoretic [Atenolol-Chlorthalidone] Other (See Comments)     ED    Egg-Derived Products Other (See Comments)      "Positive allergy test    Lisinopril Other (See Comments)     drowsy    Penicillins Hives       Family History   Problem Relation Age of Onset    Hypertension Mother     Hyperlipidemia Mother     Liver cancer Mother     Depression Father     Alcohol abuse Father     Hypertension Sister     Diabetes Sister     Hypertension Sister     Diabetes Sister     Liver cancer Sister     No Known Problems Daughter     Heart attack Son     No Known Problems Maternal Grandmother     Stroke Maternal Grandfather     Stroke Paternal Grandmother     No Known Problems Paternal Grandfather        Cancer-related family history includes Liver cancer in his mother and sister.      Social History     Tobacco Use    Smoking status: Never     Passive exposure: Never    Smokeless tobacco: Former     Types: Chew     Quit date: 1990   Vaping Use    Vaping status: Never Used   Substance Use Topics    Alcohol use: Not Currently    Drug use: Not Currently     Social History     Social History Narrative    Not on file       ROS:   Review of Systems   Constitutional:  Positive for fatigue.   HENT: Negative.     Eyes: Negative.    Respiratory: Negative.     Cardiovascular: Negative.    Gastrointestinal: Negative.    Endocrine: Negative.    Genitourinary: Negative.    Musculoskeletal:  Positive for back pain.   Skin: Negative.    Allergic/Immunologic: Negative.    Neurological: Negative.    Hematological: Negative.    Psychiatric/Behavioral: Negative.           Objective:    Vital Signs:  Vitals:    11/21/24 1240   BP: 145/84   Pulse: 67   Resp: 14   Temp: 97.6 °F (36.4 °C)   SpO2: 97%   Weight: 98.2 kg (216 lb 9.6 oz)   Height: 188 cm (74\")   PainSc: 0-No pain     Body mass index is 27.81 kg/m².    ECOG  (0) Fully active, able to carry on all predisease performance without restriction    Physical Exam:   Physical Exam  Constitutional:       Appearance: Normal appearance. He is normal weight.   HENT:      Head: Normocephalic and atraumatic.      Right " Ear: External ear normal.      Left Ear: External ear normal.      Nose: Nose normal.      Mouth/Throat:      Mouth: Mucous membranes are moist.      Pharynx: Oropharynx is clear.   Eyes:      Extraocular Movements: Extraocular movements intact.      Conjunctiva/sclera: Conjunctivae normal.      Pupils: Pupils are equal, round, and reactive to light.   Cardiovascular:      Rate and Rhythm: Normal rate.      Pulses: Normal pulses.   Pulmonary:      Effort: Pulmonary effort is normal.   Abdominal:      General: Abdomen is flat.      Palpations: Abdomen is soft.   Musculoskeletal:         General: Normal range of motion.      Cervical back: Normal range of motion and neck supple.   Skin:     General: Skin is warm.   Neurological:      Mental Status: He is alert.   Psychiatric:         Mood and Affect: Mood normal.         Behavior: Behavior normal.         Thought Content: Thought content normal.         Judgment: Judgment normal.         Lab Results - Last 18 Months   Lab Units 09/06/24  1510 06/12/24  1000   WBC 10*3/mm3 6.43 5.31   HEMOGLOBIN g/dL 15.6 14.4   HEMATOCRIT % 44.5 42.9   PLATELETS 10*3/mm3 122* 126*   MCV fL 89.4 90.7     Lab Results - Last 18 Months   Lab Units 09/23/24  1145 09/06/24  1510 06/12/24  1000 07/25/23  0300   SODIUM mmol/L 138 136 140 141   POTASSIUM mmol/L 3.7 3.6 3.4* 3.5   CHLORIDE mmol/L 99 99 102 100   CO2 mmol/L 26.1 24.9 26.6 25.8   BUN mg/dL 9 11 12 8   CREATININE mg/dL 0.75* 0.62* 0.73* 0.75*   CALCIUM mg/dL 9.5 10.1 9.1 9.7   BILIRUBIN mg/dL 0.6  --  0.5 0.7   ALK PHOS U/L 60  --  53 55   ALT (SGPT) U/L 36  --  34 41   AST (SGOT) U/L 31  --  29 33   GLUCOSE mg/dL 172* 156* 141* 150*       Lab Results   Component Value Date    GLUCOSE 172 (H) 09/23/2024    BUN 9 09/23/2024    CREATININE 0.75 (L) 09/23/2024    BCR 12.0 09/23/2024    K 3.7 09/23/2024    CO2 26.1 09/23/2024    CALCIUM 9.5 09/23/2024    ALBUMIN 4.6 09/23/2024    AST 31 09/23/2024    ALT 36 09/23/2024       No results  "for input(s): \"APTT\", \"INR\", \"PTT\" in the last 05836 hours.    No results found for: \"IRON\", \"TIBC\", \"FERRITIN\"    No results found for: \"FOLATE\"    No results found for: \"OCCULTBLD\"    No results found for: \"RETICCTPCT\"  No results found for: \"JLSYGHHU56\"  No results found for: \"SPEP\", \"UPEP\"  No results found for: \"LDH\", \"URICACID\"  No results found for: \"MUKESH\", \"RF\", \"SEDRATE\"  No results found for: \"FIBRINOGEN\", \"HAPTOGLOBIN\"  Lab Results   Component Value Date    PTT 28.5 10/16/2018    INR 1.1 10/16/2018     No results found for: \"\"  No results found for: \"CEA\"  No components found for: \"CA-19-9\"  Lab Results   Component Value Date    PSA 0.423 06/12/2024     No results found for: \"SEDRATE\"       Assessment & Plan     Chronic mild thrombocytopenia:  -Unclear etiology.  Discussed with patient regarding various differential diagnosis for thrombocytopenia.  -Will order further workup including nutritional panel, hemolysis labs, plasma cell disorder workup, hepatitis panel and HIV antibody panel.  -Ordered ultrasound abdomen to evaluate for hepatosplenomegaly or other liver pathology  -Given long-term stability of his platelet counts, a bone marrow biopsy is not needed, unless indicated based on lab work.    Chronic persistent fatigue:  -This is likely multifactorial.  Check nutritional panel, TSH and MUKESH.    4 week follow up with lab results, sooner as needed,    Thank you very much for providing the opportunity to participate in this patient’s care. Please do not hesitate to call if there are any other questions.    "

## 2024-11-21 ENCOUNTER — CONSULT (OUTPATIENT)
Dept: ONCOLOGY | Facility: CLINIC | Age: 59
End: 2024-11-21
Payer: COMMERCIAL

## 2024-11-21 VITALS
HEIGHT: 74 IN | DIASTOLIC BLOOD PRESSURE: 84 MMHG | RESPIRATION RATE: 14 BRPM | SYSTOLIC BLOOD PRESSURE: 145 MMHG | OXYGEN SATURATION: 97 % | WEIGHT: 216.6 LBS | HEART RATE: 67 BPM | TEMPERATURE: 97.6 F | BODY MASS INDEX: 27.8 KG/M2

## 2024-11-21 DIAGNOSIS — R53.83 OTHER FATIGUE: ICD-10-CM

## 2024-11-21 DIAGNOSIS — D69.6 THROMBOCYTOPENIA: Primary | ICD-10-CM

## 2024-11-25 DIAGNOSIS — E87.6 HYPOKALEMIA: ICD-10-CM

## 2024-11-25 RX ORDER — POTASSIUM CHLORIDE 1500 MG/1
TABLET, EXTENDED RELEASE ORAL
Qty: 180 TABLET | Refills: 0 | Status: SHIPPED | OUTPATIENT
Start: 2024-11-25

## 2024-12-02 ENCOUNTER — TELEPHONE (OUTPATIENT)
Dept: FAMILY MEDICINE CLINIC | Facility: CLINIC | Age: 59
End: 2024-12-02

## 2024-12-02 NOTE — TELEPHONE ENCOUNTER
Hub staff attempted to follow warm transfer process and was unsuccessful     Caller: Gustavo Singh    Relationship to patient: Self    Best call back number: [phone]    Patient is needing: PATIENT STATED HE WOULD LIKE TO STOP BY TO GET HIS LABS DONE.     PLEASE CALL TO ADVISE

## 2024-12-03 DIAGNOSIS — E78.2 MIXED HYPERLIPIDEMIA: ICD-10-CM

## 2024-12-03 DIAGNOSIS — E11.9 TYPE 2 DIABETES MELLITUS WITHOUT COMPLICATION, WITHOUT LONG-TERM CURRENT USE OF INSULIN: ICD-10-CM

## 2024-12-03 DIAGNOSIS — I10 ESSENTIAL (PRIMARY) HYPERTENSION: ICD-10-CM

## 2024-12-03 RX ORDER — ROSUVASTATIN CALCIUM 40 MG/1
40 TABLET, COATED ORAL DAILY
Qty: 90 TABLET | Refills: 0 | Status: SHIPPED | OUTPATIENT
Start: 2024-12-03

## 2024-12-03 RX ORDER — ALOGLIPTIN 25 MG/1
TABLET, FILM COATED ORAL
Qty: 90 TABLET | Refills: 0 | Status: SHIPPED | OUTPATIENT
Start: 2024-12-03

## 2024-12-03 RX ORDER — AMLODIPINE BESYLATE 10 MG/1
10 TABLET ORAL DAILY
Qty: 90 TABLET | Refills: 0 | Status: SHIPPED | OUTPATIENT
Start: 2024-12-03

## 2024-12-04 DIAGNOSIS — E11.65 TYPE 2 DIABETES MELLITUS WITH HYPERGLYCEMIA, WITHOUT LONG-TERM CURRENT USE OF INSULIN: ICD-10-CM

## 2024-12-04 RX ORDER — PIOGLITAZONE 30 MG/1
30 TABLET ORAL DAILY
Qty: 30 TABLET | Refills: 2 | Status: SHIPPED | OUTPATIENT
Start: 2024-12-04

## 2024-12-05 ENCOUNTER — HOSPITAL ENCOUNTER (OUTPATIENT)
Dept: ULTRASOUND IMAGING | Facility: HOSPITAL | Age: 59
Discharge: HOME | End: 2024-12-05
Admitting: STUDENT IN AN ORGANIZED HEALTH CARE EDUCATION/TRAINING PROGRAM
Payer: COMMERCIAL

## 2024-12-05 DIAGNOSIS — D69.6 THROMBOCYTOPENIA: ICD-10-CM

## 2024-12-05 PROCEDURE — 76705 ECHO EXAM OF ABDOMEN: CPT

## 2024-12-09 DIAGNOSIS — E78.1 HYPERTRIGLYCERIDEMIA: ICD-10-CM

## 2024-12-09 RX ORDER — OMEGA-3-ACID ETHYL ESTERS 1 G/1
1 CAPSULE, LIQUID FILLED ORAL 2 TIMES DAILY
Qty: 180 CAPSULE | Refills: 0 | Status: SHIPPED | OUTPATIENT
Start: 2024-12-09

## 2024-12-09 NOTE — TELEPHONE ENCOUNTER
Rx Refill Note  Requested Prescriptions     Pending Prescriptions Disp Refills    omega-3 acid ethyl esters (LOVAZA) 1 g capsule [Pharmacy Med Name: omega-3 acid ethyl esters 1 gram capsule] 180 capsule 0     Sig: TAKE ONE CAPSULE BY MOUTH TWICE DAILY      Last office visit with prescribing clinician: 9/6/2024   Last telemedicine visit with prescribing clinician: Visit date not found   Next office visit with prescribing clinician: 12/12/2024                         Would you like a call back once the refill request has been completed: [] Yes [] No    If the office needs to give you a call back, can they leave a voicemail: [] Yes [] No    Terra Zimmer MA  12/09/24, 09:06 EST

## 2024-12-19 ENCOUNTER — TELEMEDICINE (OUTPATIENT)
Dept: FAMILY MEDICINE CLINIC | Facility: CLINIC | Age: 59
End: 2024-12-19
Payer: COMMERCIAL

## 2024-12-19 DIAGNOSIS — M54.50 CHRONIC BILATERAL LOW BACK PAIN WITHOUT SCIATICA: ICD-10-CM

## 2024-12-19 DIAGNOSIS — Z76.0 MEDICATION REFILL: ICD-10-CM

## 2024-12-19 DIAGNOSIS — Z20.828 EXPOSURE TO INFLUENZA: ICD-10-CM

## 2024-12-19 DIAGNOSIS — R05.1 ACUTE COUGH: ICD-10-CM

## 2024-12-19 DIAGNOSIS — R16.2 HEPATOSPLENOMEGALY: ICD-10-CM

## 2024-12-19 DIAGNOSIS — E11.65 TYPE 2 DIABETES MELLITUS WITH HYPERGLYCEMIA, WITHOUT LONG-TERM CURRENT USE OF INSULIN: Primary | ICD-10-CM

## 2024-12-19 DIAGNOSIS — G89.29 CHRONIC BILATERAL LOW BACK PAIN WITHOUT SCIATICA: ICD-10-CM

## 2024-12-19 PROCEDURE — 99214 OFFICE O/P EST MOD 30 MIN: CPT | Performed by: NURSE PRACTITIONER

## 2024-12-19 RX ORDER — CYCLOBENZAPRINE HCL 5 MG
5 TABLET ORAL 2 TIMES DAILY PRN
Qty: 20 TABLET | Refills: 1 | Status: SHIPPED | OUTPATIENT
Start: 2024-12-19

## 2024-12-19 RX ORDER — ALBUTEROL SULFATE 90 UG/1
2 INHALANT RESPIRATORY (INHALATION) EVERY 4 HOURS PRN
Qty: 18 G | Refills: 0 | Status: SHIPPED | OUTPATIENT
Start: 2024-12-19

## 2024-12-19 RX ORDER — MELOXICAM 7.5 MG/1
7.5 TABLET ORAL DAILY
Qty: 30 TABLET | Refills: 1 | Status: SHIPPED | OUTPATIENT
Start: 2024-12-19

## 2024-12-19 RX ORDER — BENZONATATE 200 MG/1
200 CAPSULE ORAL 3 TIMES DAILY PRN
Qty: 30 CAPSULE | Refills: 0 | Status: SHIPPED | OUTPATIENT
Start: 2024-12-19 | End: 2024-12-29

## 2024-12-19 NOTE — PROGRESS NOTES
Gustavo Singh  [id number]  1965  male     12/19/2024      Chief Complaint  Cough (Started 2 weeks ago. States his dad has Type A Flu. ), Back Pain, and Diabetes    History of Present Illness  59-year-old male patient presents today to follow-up on diabetes. Most recent home fasting glucose readings were 98 and 128. Tolerating diabetes meds well.  Denies any hypoglycemic events since I last seen him.    Patient resides at his home in [place] while I reside [place] my home [place] during telehealth visit.  Patient gave verbal consent to proceed with [facility] telehealth visit.  Patient states he can see and hear me well as I can also see and hear him well.    Patient has labs tomorrow per hematology. Has appt with hematology on 01/02/2024 to follow up on hepatosplenomegaly. LFTs in 09/2024 that we matt were WNL. Has had a cough x 2 weeks. Father has Influenza A right now.  As fever, chills, body aches, headache, earache, sore throat, shortness of breath, wheezing, chest pain, abdominal pain, NVD, dysuria, rash.      Cough  Pertinent negatives include no shortness of breath or wheezing.   Back Pain                Review of Systems   Constitutional: Negative.    HENT: Negative.     Eyes: Negative.    Respiratory:  Positive for cough. Negative for chest tightness, shortness of breath and wheezing.    Cardiovascular: Negative.    Gastrointestinal: Negative.    Endocrine: Negative.    Genitourinary: Negative.    Musculoskeletal:  Positive for back pain (low). Negative for arthralgias, gait problem, neck pain and neck stiffness.   Skin: Negative.    Neurological: Negative.    Hematological: Negative.    Psychiatric/Behavioral: Negative.         Past Medical History:   Diagnosis Date    Allergies     eggs, corn, green beans- immunotherapy in childhood    Diabetes mellitus     Hyperlipidemia     Hypertension     CHASITY (obstructive sleep apnea)     Tobacco chew use     quit in 1985    Wears dentures     upper    Wears glasses         Past Surgical History:   Procedure Laterality Date    CARDIAC CATHETERIZATION  10/2018    CHD, 1 stent placement    CIRCUMCISION  08/1964    COLONOSCOPY  1985    HEEL SPUR SURGERY Left 2002    TONSILLECTOMY AND ADENOIDECTOMY  1981    WISDOM TOOTH EXTRACTION         Family History   Problem Relation Age of Onset    Hypertension Mother     Hyperlipidemia Mother     Liver cancer Mother     Depression Father     Alcohol abuse Father     Hypertension Sister     Diabetes Sister     Hypertension Sister     Diabetes Sister     Liver cancer Sister     No Known Problems Daughter     Heart attack Son     No Known Problems Maternal Grandmother     Stroke Maternal Grandfather     Stroke Paternal Grandmother     No Known Problems Paternal Grandfather        Social History     Socioeconomic History    Marital status:    Tobacco Use    Smoking status: Never     Passive exposure: Never    Smokeless tobacco: Former     Types: Chew     Quit date: 1990   Vaping Use    Vaping status: Never Used   Substance and Sexual Activity    Alcohol use: Not Currently    Drug use: Not Currently    Sexual activity: Defer        Allergies   Allergen Reactions    Tenoretic [Atenolol-Chlorthalidone] Other (See Comments)     ED    Egg-Derived Products Other (See Comments)     Positive allergy test    Lisinopril Other (See Comments)     drowsy    Penicillins Hives         Objective   Vital Signs:   There were no vitals taken for this visit.      Physical Exam  Vitals and nursing note reviewed.   Constitutional:       General: He is not in acute distress.     Appearance: Normal appearance. He is normal weight. He is not ill-appearing, toxic-appearing or diaphoretic.   HENT:      Head: Normocephalic and atraumatic.      Right Ear: External ear normal.      Left Ear: External ear normal.      Nose: Nose normal.   Eyes:      Extraocular Movements: Extraocular movements intact.      Conjunctiva/sclera: Conjunctivae normal.      Pupils: Pupils are  equal, round, and reactive to light.   Pulmonary:      Effort: Pulmonary effort is normal. No respiratory distress.   Musculoskeletal:         General: Normal range of motion.      Cervical back: Normal range of motion and neck supple.   Neurological:      General: No focal deficit present.      Mental Status: He is alert and oriented to person, place, and time. Mental status is at baseline.   Psychiatric:         Mood and Affect: Mood normal.         Behavior: Behavior normal.         Thought Content: Thought content normal.         Judgment: Judgment normal.                 Assessment and Plan   Diagnoses and all orders for this visit:    1. Type 2 diabetes mellitus with hyperglycemia, without long-term current use of insulin (Primary)    2. Hepatosplenomegaly    3. Acute cough  -     albuterol sulfate  (90 Base) MCG/ACT inhaler; Inhale 2 puffs Every 4 (Four) Hours As Needed for Shortness of Air (cough).  Dispense: 18 g; Refill: 0  -     benzonatate (TESSALON) 200 MG capsule; Take 1 capsule by mouth 3 (Three) Times a Day As Needed for Cough for up to 10 days.  Dispense: 30 capsule; Refill: 0    4. Medication refill  -     cyclobenzaprine (FLEXERIL) 5 MG tablet; Take 1 tablet by mouth 2 (Two) Times a Day As Needed for Muscle Spasms.  Dispense: 20 tablet; Refill: 1  -     meloxicam (Mobic) 7.5 MG tablet; Take 1 tablet by mouth Daily.  Dispense: 30 tablet; Refill: 1    5. Exposure to influenza    6. Chronic bilateral low back pain without sciatica  -     cyclobenzaprine (FLEXERIL) 5 MG tablet; Take 1 tablet by mouth 2 (Two) Times a Day As Needed for Muscle Spasms.  Dispense: 20 tablet; Refill: 1  -     meloxicam (Mobic) 7.5 MG tablet; Take 1 tablet by mouth Daily.  Dispense: 30 tablet; Refill: 1    Type 2 diabetes  -Patient reports most recent home glucose readings 98 and 128 fasting.  -Last A1c 8.0 on September 23, 2024 labs.  -Will recheck A1c at next visit with me in 3 months.  -Continue current meds.   Instructed patient to avoid sweets, candy, pastries, carbs, pasta, sweetened beverages and consume more water, fruits, vegetables.  -Instructed patient to monitor glucose at home daily and call my office if his glucose is less than 70 or greater than 200.    Chronic bilateral low back pain  -Continue meloxicam and Flexeril.  Refilled today.  Denies recent trauma or injury.  -Apply ice to affected area 2-3 times a day for 20 minutes at a time.    Hepatosplenomegaly  -Patient has labs tomorrow per hematology.   -Has appt with hematology on 01/02/2024 to follow up on hepatosplenomegaly.   -LFTs in 09/2024 that we matt were WNL.    Acute cough  -Patient in no respiratory distress during video visit.   -Close exposure to influenza A.   -Patient declined being tested today.  -Albuterol inhaler PRN for cough.  -Tessalon PRN for cough.   -Push fluids.    -Discussed ER red flags.  -Instructed patient to follow-up with me in 3 months for diabetes and fasting labs at that appointment..  Will recheck A1c at that appointment.    Follow Up   Return in about 3 months (around 3/19/2025) for Recheck.    There are no Patient Instructions on file for this visit.

## 2024-12-20 ENCOUNTER — CLINICAL SUPPORT (OUTPATIENT)
Dept: FAMILY MEDICINE CLINIC | Facility: CLINIC | Age: 59
End: 2024-12-20
Payer: COMMERCIAL

## 2024-12-20 DIAGNOSIS — R53.83 OTHER FATIGUE: ICD-10-CM

## 2024-12-20 DIAGNOSIS — D69.6 THROMBOCYTOPENIA: ICD-10-CM

## 2024-12-20 LAB
BASOPHILS # BLD AUTO: 0.07 10*3/MM3 (ref 0–0.2)
BASOPHILS NFR BLD AUTO: 1.2 % (ref 0–1.5)
DEPRECATED RDW RBC AUTO: 45.8 FL (ref 37–54)
EOSINOPHIL # BLD AUTO: 0.12 10*3/MM3 (ref 0–0.4)
EOSINOPHIL NFR BLD AUTO: 2 % (ref 0.3–6.2)
ERYTHROCYTE [DISTWIDTH] IN BLOOD BY AUTOMATED COUNT: 13.7 % (ref 12.3–15.4)
FERRITIN SERPL-MCNC: NORMAL NG/ML
HBV SURFACE AG SERPL QL IA: NORMAL
HCT VFR BLD AUTO: 46.3 % (ref 37.5–51)
HCV AB SER QL: NORMAL
HGB BLD-MCNC: 15.9 G/DL (ref 13–17.7)
HIV 1+2 AB+HIV1 P24 AG SERPL QL IA: NORMAL
IMM GRANULOCYTES # BLD AUTO: 0.03 10*3/MM3 (ref 0–0.05)
IMM GRANULOCYTES NFR BLD AUTO: 0.5 % (ref 0–0.5)
IRON 24H UR-MRATE: NORMAL
IRON SATN MFR SERPL: NORMAL %
LYMPHOCYTES # BLD AUTO: 1.94 10*3/MM3 (ref 0.7–3.1)
LYMPHOCYTES NFR BLD AUTO: 32.3 % (ref 19.6–45.3)
MCH RBC QN AUTO: 31.7 PG (ref 26.6–33)
MCHC RBC AUTO-ENTMCNC: 34.3 G/DL (ref 31.5–35.7)
MCV RBC AUTO: 92.4 FL (ref 79–97)
MONOCYTES # BLD AUTO: 0.45 10*3/MM3 (ref 0.1–0.9)
MONOCYTES NFR BLD AUTO: 7.5 % (ref 5–12)
NEUTROPHILS NFR BLD AUTO: 3.4 10*3/MM3 (ref 1.7–7)
NEUTROPHILS NFR BLD AUTO: 56.5 % (ref 42.7–76)
NRBC BLD AUTO-RTO: 0 /100 WBC (ref 0–0.2)
PLAT MORPH BLD: NORMAL
PLATELET # BLD AUTO: 143 10*3/MM3 (ref 140–450)
PMV BLD AUTO: 10.7 FL (ref 6–12)
RBC # BLD AUTO: 5.01 10*6/MM3 (ref 4.14–5.8)
RBC MORPH BLD: NORMAL
RETICS # AUTO: 0.1 10*6/MM3 (ref 0.02–0.13)
RETICS/RBC NFR AUTO: 1.99 % (ref 0.7–1.9)
TIBC SERPL-MCNC: NORMAL UG/DL
TRANSFERRIN SERPL-MCNC: NORMAL MG/DL
TSH SERPL DL<=0.05 MIU/L-ACNC: NORMAL M[IU]/L
WBC MORPH BLD: NORMAL
WBC NRBC COR # BLD AUTO: 6.01 10*3/MM3 (ref 3.4–10.8)

## 2024-12-20 PROCEDURE — 82784 ASSAY IGA/IGD/IGG/IGM EACH: CPT | Performed by: STUDENT IN AN ORGANIZED HEALTH CARE EDUCATION/TRAINING PROGRAM

## 2024-12-20 PROCEDURE — 85025 COMPLETE CBC W/AUTO DIFF WBC: CPT | Performed by: STUDENT IN AN ORGANIZED HEALTH CARE EDUCATION/TRAINING PROGRAM

## 2024-12-20 PROCEDURE — 86704 HEP B CORE ANTIBODY TOTAL: CPT | Performed by: STUDENT IN AN ORGANIZED HEALTH CARE EDUCATION/TRAINING PROGRAM

## 2024-12-20 PROCEDURE — 87340 HEPATITIS B SURFACE AG IA: CPT | Performed by: STUDENT IN AN ORGANIZED HEALTH CARE EDUCATION/TRAINING PROGRAM

## 2024-12-20 PROCEDURE — 86334 IMMUNOFIX E-PHORESIS SERUM: CPT | Performed by: STUDENT IN AN ORGANIZED HEALTH CARE EDUCATION/TRAINING PROGRAM

## 2024-12-20 PROCEDURE — 86038 ANTINUCLEAR ANTIBODIES: CPT | Performed by: STUDENT IN AN ORGANIZED HEALTH CARE EDUCATION/TRAINING PROGRAM

## 2024-12-20 PROCEDURE — 84466 ASSAY OF TRANSFERRIN: CPT | Performed by: STUDENT IN AN ORGANIZED HEALTH CARE EDUCATION/TRAINING PROGRAM

## 2024-12-20 PROCEDURE — 36415 COLL VENOUS BLD VENIPUNCTURE: CPT | Performed by: STUDENT IN AN ORGANIZED HEALTH CARE EDUCATION/TRAINING PROGRAM

## 2024-12-20 PROCEDURE — 82607 VITAMIN B-12: CPT | Performed by: STUDENT IN AN ORGANIZED HEALTH CARE EDUCATION/TRAINING PROGRAM

## 2024-12-20 PROCEDURE — 84165 PROTEIN E-PHORESIS SERUM: CPT | Performed by: STUDENT IN AN ORGANIZED HEALTH CARE EDUCATION/TRAINING PROGRAM

## 2024-12-20 PROCEDURE — 83010 ASSAY OF HAPTOGLOBIN QUANT: CPT | Performed by: STUDENT IN AN ORGANIZED HEALTH CARE EDUCATION/TRAINING PROGRAM

## 2024-12-20 PROCEDURE — 85007 BL SMEAR W/DIFF WBC COUNT: CPT | Performed by: STUDENT IN AN ORGANIZED HEALTH CARE EDUCATION/TRAINING PROGRAM

## 2024-12-20 PROCEDURE — G0432 EIA HIV-1/HIV-2 SCREEN: HCPCS | Performed by: STUDENT IN AN ORGANIZED HEALTH CARE EDUCATION/TRAINING PROGRAM

## 2024-12-20 PROCEDURE — 83521 IG LIGHT CHAINS FREE EACH: CPT | Performed by: STUDENT IN AN ORGANIZED HEALTH CARE EDUCATION/TRAINING PROGRAM

## 2024-12-20 PROCEDURE — 82728 ASSAY OF FERRITIN: CPT | Performed by: STUDENT IN AN ORGANIZED HEALTH CARE EDUCATION/TRAINING PROGRAM

## 2024-12-20 PROCEDURE — 86706 HEP B SURFACE ANTIBODY: CPT | Performed by: STUDENT IN AN ORGANIZED HEALTH CARE EDUCATION/TRAINING PROGRAM

## 2024-12-20 PROCEDURE — 84443 ASSAY THYROID STIM HORMONE: CPT | Performed by: STUDENT IN AN ORGANIZED HEALTH CARE EDUCATION/TRAINING PROGRAM

## 2024-12-20 PROCEDURE — 83540 ASSAY OF IRON: CPT | Performed by: STUDENT IN AN ORGANIZED HEALTH CARE EDUCATION/TRAINING PROGRAM

## 2024-12-20 PROCEDURE — 84155 ASSAY OF PROTEIN SERUM: CPT | Performed by: STUDENT IN AN ORGANIZED HEALTH CARE EDUCATION/TRAINING PROGRAM

## 2024-12-20 PROCEDURE — 82746 ASSAY OF FOLIC ACID SERUM: CPT | Performed by: STUDENT IN AN ORGANIZED HEALTH CARE EDUCATION/TRAINING PROGRAM

## 2024-12-20 PROCEDURE — 86708 HEPATITIS A ANTIBODY: CPT | Performed by: STUDENT IN AN ORGANIZED HEALTH CARE EDUCATION/TRAINING PROGRAM

## 2024-12-20 PROCEDURE — 86803 HEPATITIS C AB TEST: CPT | Performed by: STUDENT IN AN ORGANIZED HEALTH CARE EDUCATION/TRAINING PROGRAM

## 2024-12-20 PROCEDURE — 85045 AUTOMATED RETICULOCYTE COUNT: CPT | Performed by: STUDENT IN AN ORGANIZED HEALTH CARE EDUCATION/TRAINING PROGRAM

## 2024-12-20 NOTE — PROGRESS NOTES
Venipuncture Blood Specimen Collection  Venipuncture performed in RT ARM by Noemi Lee with good hemostasis. Patient tolerated the procedure well without complications.   12/20/24   oNemi Lee

## 2024-12-21 LAB
FOLATE SERPL-MCNC: >20 NG/ML (ref 4.78–24.2)
HAPTOGLOB SERPL-MCNC: 160 MG/DL (ref 30–200)
HAV AB SER QL IA: NEGATIVE
HBV CORE AB SERPL QL IA: NEGATIVE
HBV SURFACE AB SER RIA-ACNC: NORMAL
VIT B12 BLD-MCNC: 1274 PG/ML (ref 211–946)

## 2024-12-23 DIAGNOSIS — I10 BENIGN HYPERTENSION: ICD-10-CM

## 2024-12-23 LAB — ANA SER QL IF: NEGATIVE

## 2024-12-23 RX ORDER — HYDROCHLOROTHIAZIDE 25 MG/1
TABLET ORAL
Qty: 90 TABLET | Refills: 0 | Status: SHIPPED | OUTPATIENT
Start: 2024-12-23

## 2024-12-24 LAB
ALBUMIN SERPL ELPH-MCNC: 4.3 G/DL (ref 2.9–4.4)
ALBUMIN/GLOB SERPL: 1.5 {RATIO} (ref 0.7–1.7)
ALPHA1 GLOB SERPL ELPH-MCNC: 0.3 G/DL (ref 0–0.4)
ALPHA2 GLOB SERPL ELPH-MCNC: 0.8 G/DL (ref 0.4–1)
B-GLOBULIN SERPL ELPH-MCNC: 1.1 G/DL (ref 0.7–1.3)
GAMMA GLOB SERPL ELPH-MCNC: 0.8 G/DL (ref 0.4–1.8)
GLOBULIN SER-MCNC: 3 G/DL (ref 2.2–3.9)
IGA SERPL-MCNC: 132 MG/DL (ref 90–386)
IGG SERPL-MCNC: 854 MG/DL (ref 603–1613)
IGM SERPL-MCNC: 83 MG/DL (ref 20–172)
INTERPRETATION SERPL IEP-IMP: NORMAL
KAPPA LC FREE SER-MCNC: 18.6 MG/L (ref 3.3–19.4)
KAPPA LC FREE/LAMBDA FREE SER: 1.26 {RATIO} (ref 0.26–1.65)
LABORATORY COMMENT REPORT: NORMAL
LAMBDA LC FREE SERPL-MCNC: 14.8 MG/L (ref 5.7–26.3)
M PROTEIN SERPL ELPH-MCNC: NORMAL G/DL
PROT SERPL-MCNC: 7.3 G/DL (ref 6–8.5)

## 2024-12-27 NOTE — PROGRESS NOTES
HEMATOLOGY ONCOLOGY OUTPATIENT FOLLOW UP       Patient name: Gustavo Singh  : 1965  MRN: [id number]  Primary Care Physician: Pratik Martinez APRN  Referring Physician: Pratik Martinez APRN  Reason For Consult:       History of Present Illness:  Patient is a 59 y.o. male who has been referred to us for further evaluation and management of thrombocytopenia.  Most recent labs were reviewed and are as follows:    2024:  CBC: 6.4/15.6/44.5/122    On review of labs, it appears that patient has chronic mild thrombocytopenia going back 6 years, possibly longer.      His past medical history significant for coronary artery disease, status post PCI/stent placement, diabetes, on OHA's, hypertension and dyslipidemia.    He is currently on daily low-dose aspirin.  Denied any significant bruising or bleeding history.      He has not had a screening colonoscopy before.  Undergoing Cologuard screening with PCP.    Patient denied any history of alcohol use or smoking.  No recent infection/illness reported.          2024 patient presents for initial consultation today.  He reported having chronic persistent fatigue and chronic low backache for last 4-5 years.  Denied any recent weight/appetite changes.  No other symptoms reported.    Subjective:  25: Patient seen today for follow-up.  Reported having URI symptoms recently, denied any new complaints.  Has ongoing fatigue.      Past Medical History:   Diagnosis Date    Allergies     eggs, corn, green beans- immunotherapy in childhood    Diabetes mellitus     Hyperlipidemia     Hypertension     CHASITY (obstructive sleep apnea)     Tobacco chew use     quit in     Wears dentures     upper    Wears glasses        Past Surgical History:   Procedure Laterality Date    CARDIAC CATHETERIZATION  10/2018    CHD, 1 stent placement    CIRCUMCISION  1964    COLONOSCOPY  1985    HEEL SPUR SURGERY Left     TONSILLECTOMY AND ADENOIDECTOMY  1981     WISDOM TOOTH EXTRACTION           Current Outpatient Medications:     albuterol sulfate  (90 Base) MCG/ACT inhaler, Inhale 2 puffs Every 4 (Four) Hours As Needed for Shortness of Air (cough)., Disp: 18 g, Rfl: 0    Alogliptin Benzoate 25 MG tablet, TAKE ONE TABLET BY MOUTH EVERY DAY, Disp: 90 tablet, Rfl: 0    amLODIPine (NORVASC) 10 MG tablet, TAKE ONE TABLET BY MOUTH DAILY, Disp: 90 tablet, Rfl: 0    ASPIRIN 81 PO, Take 81 mg by mouth Daily., Disp: , Rfl:     benzonatate (TESSALON) 200 MG capsule, Take 1 capsule by mouth 3 (Three) Times a Day As Needed for Cough for up to 10 days., Disp: 30 capsule, Rfl: 0    betamethasone dipropionate (DIPROLENE) 0.05 % ointment, Apply 1 Application topically to the appropriate area as directed 2 (Two) Times a Day., Disp: , Rfl:     cyclobenzaprine (FLEXERIL) 5 MG tablet, Take 1 tablet by mouth 2 (Two) Times a Day As Needed for Muscle Spasms., Disp: 20 tablet, Rfl: 1    fexofenadine (ALLEGRA) 180 MG tablet, Take 1 tablet by mouth As Needed., Disp: , Rfl:     gabapentin (NEURONTIN) 400 MG capsule, TAKE ONE CAPSULE BY MOUTH TWICE DAILY, Disp: 180 capsule, Rfl: 0    hydroCHLOROthiazide 25 MG tablet, TAKE ONE TABLET BY MOUTH EVERY MORNING, Disp: 90 tablet, Rfl: 0    meloxicam (Mobic) 7.5 MG tablet, Take 1 tablet by mouth Daily., Disp: 30 tablet, Rfl: 1    metFORMIN (GLUCOPHAGE) 1000 MG tablet, TAKE ONE TABLET BY MOUTH TWICE DAILY WITH MEALS, Disp: 180 tablet, Rfl: 1    montelukast (SINGULAIR) 10 MG tablet, TAKE ONE TABLET BY MOUTH IN THE EVENING AS NEEDED, Disp: 90 tablet, Rfl: 3    multivitamin with minerals tablet tablet, Take 1 tablet by mouth Daily., Disp: , Rfl:     niacin (NIASPAN) 500 MG CR tablet, Take 1 tablet by mouth Daily., Disp: , Rfl:     olmesartan (BENICAR) 40 MG tablet, Take 1 tablet by mouth Daily., Disp: , Rfl:     omega-3 acid ethyl esters (LOVAZA) 1 g capsule, TAKE ONE CAPSULE BY MOUTH TWICE DAILY, Disp: 180 capsule, Rfl: 0    pioglitazone (ACTOS) 30 MG  tablet, TAKE ONE TABLET BY MOUTH DAILY, Disp: 30 tablet, Rfl: 2    potassium chloride (KLOR-CON M20) 20 MEQ CR tablet, TAKE ONE TABLET BY MOUTH TWICE DAILY, Disp: 180 tablet, Rfl: 0    rosuvastatin (CRESTOR) 40 MG tablet, TAKE ONE TABLET BY MOUTH DAILY, Disp: 90 tablet, Rfl: 0    Allergies   Allergen Reactions    Tenoretic [Atenolol-Chlorthalidone] Other (See Comments)     ED    Egg-Derived Products Other (See Comments)     Positive allergy test    Lisinopril Other (See Comments)     drowsy    Penicillins Hives       Family History   Problem Relation Age of Onset    Hypertension Mother     Hyperlipidemia Mother     Liver cancer Mother     Depression Father     Alcohol abuse Father     Hypertension Sister     Diabetes Sister     Hypertension Sister     Diabetes Sister     Liver cancer Sister     No Known Problems Daughter     Heart attack Son     No Known Problems Maternal Grandmother     Stroke Maternal Grandfather     Stroke Paternal Grandmother     No Known Problems Paternal Grandfather        Cancer-related family history includes Liver cancer in his mother and sister.      Social History     Tobacco Use    Smoking status: Never     Passive exposure: Never    Smokeless tobacco: Former     Types: Chew     Quit date: 1990   Vaping Use    Vaping status: Never Used   Substance Use Topics    Alcohol use: Not Currently    Drug use: Not Currently     Social History     Social History Narrative    Not on file       ROS:   Review of Systems   Constitutional:  Positive for fatigue.   HENT: Negative.     Eyes: Negative.    Respiratory: Negative.     Cardiovascular: Negative.    Gastrointestinal: Negative.    Endocrine: Negative.    Genitourinary: Negative.    Musculoskeletal:  Positive for back pain.   Skin: Negative.    Allergic/Immunologic: Negative.    Neurological: Negative.    Hematological: Negative.    Psychiatric/Behavioral: Negative.       Objective:    Vital Signs:  Vitals:    01/02/25 1303   BP: 149/85   Pulse:  "76   Resp: 18   Temp: 97.6 °F (36.4 °C)   SpO2: 97%   Weight: 95 kg (209 lb 6.4 oz)   Height: 188 cm (74\")   PainSc: 0-No pain       Body mass index is 26.89 kg/m².    ECOG  (0) Fully active, able to carry on all predisease performance without restriction    Physical Exam:   Physical Exam  Constitutional:       Appearance: Normal appearance. He is normal weight.   HENT:      Head: Normocephalic and atraumatic.      Right Ear: External ear normal.      Left Ear: External ear normal.      Nose: Nose normal.      Mouth/Throat:      Mouth: Mucous membranes are moist.      Pharynx: Oropharynx is clear.   Eyes:      Extraocular Movements: Extraocular movements intact.      Conjunctiva/sclera: Conjunctivae normal.      Pupils: Pupils are equal, round, and reactive to light.   Cardiovascular:      Rate and Rhythm: Normal rate.      Pulses: Normal pulses.   Pulmonary:      Effort: Pulmonary effort is normal.   Abdominal:      General: Abdomen is flat.      Palpations: Abdomen is soft.   Musculoskeletal:         General: Normal range of motion.      Cervical back: Normal range of motion and neck supple.   Skin:     General: Skin is warm.   Neurological:      Mental Status: He is alert.   Psychiatric:         Mood and Affect: Mood normal.         Behavior: Behavior normal.         Thought Content: Thought content normal.         Judgment: Judgment normal.         Lab Results - Last 18 Months   Lab Units 12/20/24  1318 09/06/24  1510 06/12/24  1000   WBC 10*3/mm3 6.01 6.43 5.31   HEMOGLOBIN g/dL 15.9 15.6 14.4   HEMATOCRIT % 46.3 44.5 42.9   PLATELETS 10*3/mm3 143 122* 126*   MCV fL 92.4 89.4 90.7     Lab Results - Last 18 Months   Lab Units 09/23/24  1145 09/06/24  1510 06/12/24  1000 07/25/23  0300   SODIUM mmol/L 138 136 140 141   POTASSIUM mmol/L 3.7 3.6 3.4* 3.5   CHLORIDE mmol/L 99 99 102 100   CO2 mmol/L 26.1 24.9 26.6 25.8   BUN mg/dL 9 11 12 8   CREATININE mg/dL 0.75* 0.62* 0.73* 0.75*   CALCIUM mg/dL 9.5 10.1 9.1 9.7 " "  BILIRUBIN mg/dL 0.6  --  0.5 0.7   ALK PHOS U/L 60  --  53 55   ALT (SGPT) U/L 36  --  34 41   AST (SGOT) U/L 31  --  29 33   GLUCOSE mg/dL 172* 156* 141* 150*       Lab Results   Component Value Date    GLUCOSE 172 (H) 09/23/2024    BUN 9 09/23/2024    CREATININE 0.75 (L) 09/23/2024    BCR 12.0 09/23/2024    K 3.7 09/23/2024    CO2 26.1 09/23/2024    CALCIUM 9.5 09/23/2024    PROTENTOTREF 7.3 12/20/2024    ALBUMIN 4.3 12/20/2024    LABIL2 1.5 12/20/2024    AST 31 09/23/2024    ALT 36 09/23/2024       No results for input(s): \"APTT\", \"INR\", \"PTT\" in the last 35367 hours.    Lab Results   Component Value Date    IRON  12/20/2024      Comment:      Specimen visibly hemolyzed, instrument error, results removed per protocol. Safe report completed.  Corrected result. Previous result was 88 mcg/dL on 12/20/2024 at 1840 EST.    TIBC  12/20/2024      Comment:      Specimen visibly hemolyzed, instrument error, results removed per protocol. Safe report completed.  Corrected result. Previous result was 417 mcg/dL on 12/20/2024 at 1840 EST.    FERRITIN  12/20/2024      Comment:      Specimen visibly hemolyzed, instrument error, results removed per protocol. Safe report completed.  Corrected result. Previous result was 93.30 ng/mL on 12/20/2024 at 1840 EST.       Lab Results   Component Value Date    FOLATE >20.00 12/20/2024       No results found for: \"OCCULTBLD\"    Lab Results   Component Value Date    RETICCTPCT 1.99 (H) 12/20/2024     Lab Results   Component Value Date    KNCFZRYT41 1,274 (H) 12/20/2024     No results found for: \"SPEP\", \"UPEP\"  No results found for: \"LDH\", \"URICACID\"  Lab Results   Component Value Date    MUKESH Negative 12/20/2024     Lab Results   Component Value Date    HAPTOGLOBIN 160 12/20/2024     Lab Results   Component Value Date    PTT 28.5 10/16/2018    INR 1.1 10/16/2018     No results found for: \"\"  No results found for: \"CEA\"  No components found for: \"CA-19-9\"  Lab Results   Component Value " "Date    PSA 0.423 06/12/2024     No results found for: \"SEDRATE\"       Assessment & Plan     Chronic mild thrombocytopenia: Resolved  -Unclear etiology.  Discussed with patient regarding various differential diagnosis for thrombocytopenia.  -Ordered workup including nutritional panel, hemolysis labs, plasma cell disorder workup, hepatitis panel and HIV antibody panel.  This workup is reported negative  -Ultrasound abdomen showed hepatocellular disease with possible steatohepatitis.  Also noted mild splenomegaly which is a likely etiology of previously reported thrombocytopenia    Chronic persistent fatigue:  -This is likely multifactorial.  Check nutritional panel, TSH and MUKESH.  Workup is negative.  Unclear etiology  Referral placed to sleep medicine for further evaluation.    Fatty liver disease/cirrhosis  Splenomegaly:  -Patient referred to gastroenterology for further evaluation and management    4 month follow up with repeat labs, sooner as needed,    Thank you very much for providing the opportunity to participate in this patient’s care. Please do not hesitate to call if there are any other questions.    "

## 2024-12-28 DIAGNOSIS — Z76.0 MEDICATION REFILL: ICD-10-CM

## 2024-12-28 DIAGNOSIS — M54.50 CHRONIC BILATERAL LOW BACK PAIN WITHOUT SCIATICA: ICD-10-CM

## 2024-12-28 DIAGNOSIS — G89.29 CHRONIC BILATERAL LOW BACK PAIN WITHOUT SCIATICA: ICD-10-CM

## 2024-12-30 RX ORDER — CYCLOBENZAPRINE HCL 5 MG
5 TABLET ORAL 2 TIMES DAILY PRN
Qty: 20 TABLET | Refills: 1 | OUTPATIENT
Start: 2024-12-30

## 2025-01-02 ENCOUNTER — OFFICE VISIT (OUTPATIENT)
Dept: ONCOLOGY | Facility: CLINIC | Age: 60
End: 2025-01-02

## 2025-01-02 VITALS
BODY MASS INDEX: 26.87 KG/M2 | HEIGHT: 74 IN | WEIGHT: 209.4 LBS | RESPIRATION RATE: 18 BRPM | TEMPERATURE: 97.6 F | DIASTOLIC BLOOD PRESSURE: 85 MMHG | HEART RATE: 76 BPM | OXYGEN SATURATION: 97 % | SYSTOLIC BLOOD PRESSURE: 149 MMHG

## 2025-01-02 DIAGNOSIS — R53.83 OTHER FATIGUE: ICD-10-CM

## 2025-01-02 DIAGNOSIS — D69.6 THROMBOCYTOPENIA: Primary | ICD-10-CM

## 2025-01-02 DIAGNOSIS — D50.9 IRON DEFICIENCY ANEMIA, UNSPECIFIED IRON DEFICIENCY ANEMIA TYPE: ICD-10-CM

## 2025-01-30 DIAGNOSIS — M54.50 CHRONIC BILATERAL LOW BACK PAIN WITHOUT SCIATICA: ICD-10-CM

## 2025-01-30 DIAGNOSIS — Z76.0 MEDICATION REFILL: ICD-10-CM

## 2025-01-30 DIAGNOSIS — G89.29 CHRONIC BILATERAL LOW BACK PAIN WITHOUT SCIATICA: ICD-10-CM

## 2025-01-30 RX ORDER — MELOXICAM 7.5 MG/1
7.5 TABLET ORAL DAILY
Qty: 30 TABLET | Refills: 1 | Status: SHIPPED | OUTPATIENT
Start: 2025-01-30

## 2025-02-09 ENCOUNTER — READMISSION MANAGEMENT (OUTPATIENT)
Dept: CALL CENTER | Facility: HOSPITAL | Age: 60
End: 2025-02-09

## 2025-02-09 NOTE — OUTREACH NOTE
Prep Survey      Flowsheet Row Responses   [facility] facility patient discharged from? Non-[facility]   Is LACE score < 7 ? Non-BH Discharge   Eligibility [facility]   Date of Admission 02/06/25   Date of Discharge 02/09/25   Discharge Disposition Home or Self Care   Discharge diagnosis Acute inferior myocardial infarction (   Does the patient have one of the following disease processes/diagnoses(primary or secondary)? Acute MI (STEMI,NSTEMI)   Does the patient have Home health ordered? No   Is there a DME ordered? No   Prep survey completed? Yes            MARTHA PÉREZ - Registered Nurse

## 2025-02-10 ENCOUNTER — TRANSITIONAL CARE MANAGEMENT TELEPHONE ENCOUNTER (OUTPATIENT)
Dept: CALL CENTER | Facility: HOSPITAL | Age: 60
End: 2025-02-10

## 2025-02-10 NOTE — OUTREACH NOTE
Call Center TCM Note      Flowsheet Row Responses   [facility] facility patient discharged from? Non-BH   Does the patient have one of the following disease processes/diagnoses(primary or secondary)? Other   TCM attempt successful? Yes   Call start time 0927   Change in Health Status Readmitted  [Patient states has been readmitted to [facility] with pneumonia.]   Call end time 0927   Call end time 0927            Azul Tomlinson RN    2/10/2025, 09:27 EST

## 2025-02-13 ENCOUNTER — READMISSION MANAGEMENT (OUTPATIENT)
Dept: CALL CENTER | Facility: HOSPITAL | Age: 60
End: 2025-02-13

## 2025-02-13 NOTE — OUTREACH NOTE
Prep Survey      Flowsheet Row Responses   [facility] facility patient discharged from? Non-BH   Is LACE score < 7 ? Non-BH Discharge   Eligibility [facility]   Date of Admission 02/10/25   Date of Discharge 02/13/25   Discharge diagnosis Acute inferior myocardial infarction   Does the patient have one of the following disease processes/diagnoses(primary or secondary)? Other   Prep survey completed? Yes            Ofelia DUARTE - Registered Nurse

## 2025-02-14 ENCOUNTER — TRANSITIONAL CARE MANAGEMENT TELEPHONE ENCOUNTER (OUTPATIENT)
Dept: CALL CENTER | Facility: HOSPITAL | Age: 60
End: 2025-02-14

## 2025-02-14 NOTE — OUTREACH NOTE
Call Center TCM Note      Flowsheet Row Responses   [facility] patient discharged from? Non-   Does the patient have one of the following disease processes/diagnoses(primary or secondary)? Other   TCM attempt successful? Yes   Call start time 1522   Call end time 1537   List who call center can speak with pt   Meds reviewed with patient/caregiver? Yes   Is the patient having any side effects they believe may be caused by any medication additions or changes? No   Does the patient have all medications ordered at discharge? Yes   Is the patient taking all medications as directed (includes completed medication regime)? Yes   Comments Message sent to pcp to call pt for f/u appointment.   Does the patient have an appointment with their PCP within 7-14 days of discharge? No appointments available   Nursing Interventions Routed TCM call to PCP office   Has home health visited the patient within 72 hours of discharge? N/A   Psychosocial issues? No   Did the patient receive a copy of their discharge instructions? Yes   Nursing interventions Reviewed instructions with patient   What is the patient's perception of their health status since discharge? Improving   Is the patient/caregiver able to teach back signs and symptoms related to disease process for when to call PCP? Yes   Is the patient/caregiver able to teach back signs and symptoms related to disease process for when to call 911? Yes   Is the patient/caregiver able to teach back the hierarchy of who to call/visit for symptoms/problems? PCP, Specialist, Home health nurse, Urgent Care, ED, 911 Yes   TCM call completed? Yes   Wrap up additional comments Pt reports feeling well. Pt reports no cp at this time. Pt has all medications.   Call end time 1537   Would this patient benefit from a Referral to Amb Social Work? No   Is the patient interested in additional calls from an ambulatory ? No            Ofelia Galarza, KIRK    2/14/2025, 15:39 EST

## 2025-02-14 NOTE — OUTREACH NOTE
Call Center TCM Note      Flowsheet Row Responses   [facility] patient discharged from? Non-   Does the patient have one of the following disease processes/diagnoses(primary or secondary)? Other   TCM attempt successful? No   Unsuccessful attempts Attempt 1            Ofelia Galarza RN    2/14/2025, 11:01 EST

## 2025-02-21 DIAGNOSIS — E78.2 MIXED HYPERLIPIDEMIA: ICD-10-CM

## 2025-02-21 DIAGNOSIS — E08.42 DIABETIC POLYNEUROPATHY ASSOCIATED WITH DIABETES MELLITUS DUE TO UNDERLYING CONDITION: ICD-10-CM

## 2025-02-21 RX ORDER — ROSUVASTATIN CALCIUM 40 MG/1
40 TABLET, COATED ORAL DAILY
Qty: 90 TABLET | Refills: 0 | Status: SHIPPED | OUTPATIENT
Start: 2025-02-21

## 2025-02-21 RX ORDER — GABAPENTIN 400 MG/1
CAPSULE ORAL
Qty: 180 CAPSULE | Refills: 0 | Status: SHIPPED | OUTPATIENT
Start: 2025-02-21

## 2025-02-21 NOTE — TELEPHONE ENCOUNTER
Rx Refill Note  Requested Prescriptions     Pending Prescriptions Disp Refills    gabapentin (NEURONTIN) 400 MG capsule [Pharmacy Med Name: gabapentin 400 mg capsule] 180 capsule 0     Sig: TAKE ONE CAPSULE BY MOUTH TWICE DAILY    rosuvastatin (CRESTOR) 40 MG tablet [Pharmacy Med Name: rosuvastatin 40 mg tablet] 90 tablet 0     Sig: TAKE ONE TABLET BY MOUTH DAILY      Last office visit with prescribing clinician: 9/6/2024   Last telemedicine visit with prescribing clinician: 12/19/2024       UDS   None on files  CSA   None on file    INSPECT - SCAN - INSPECT/ [facility] [place]/ 02/21/2025 (02/21/2025)     Hilda Clements MA  02/21/25, 13:42 EST

## 2025-02-27 DIAGNOSIS — E11.65 TYPE 2 DIABETES MELLITUS WITH HYPERGLYCEMIA, WITHOUT LONG-TERM CURRENT USE OF INSULIN: ICD-10-CM

## 2025-02-27 RX ORDER — PIOGLITAZONE 30 MG/1
30 TABLET ORAL DAILY
Qty: 90 TABLET | Refills: 0 | Status: SHIPPED | OUTPATIENT
Start: 2025-02-27

## 2025-03-10 ENCOUNTER — TELEPHONE (OUTPATIENT)
Dept: FAMILY MEDICINE CLINIC | Facility: CLINIC | Age: 60
End: 2025-03-10

## 2025-03-10 NOTE — TELEPHONE ENCOUNTER
Caller: Gustavo Singh    Relationship to patient: Self    Best call back number: [phone]     New or established patient?  [] New  [x] Established    Date of discharge: 2/13/25    Facility discharged from: [place]    Diagnosis/Symptoms: HEART ATTACH    Length of stay (If applicable): 3 DAYS        Additional Details: NEEDS MEDICATIONS REFILLED ASAP AS WELL.

## 2025-03-11 ENCOUNTER — TELEPHONE (OUTPATIENT)
Dept: FAMILY MEDICINE CLINIC | Facility: CLINIC | Age: 60
End: 2025-03-11

## 2025-03-11 DIAGNOSIS — E11.65 TYPE 2 DIABETES MELLITUS WITH HYPERGLYCEMIA, WITHOUT LONG-TERM CURRENT USE OF INSULIN: ICD-10-CM

## 2025-03-11 DIAGNOSIS — E78.2 MIXED HYPERLIPIDEMIA: ICD-10-CM

## 2025-03-11 DIAGNOSIS — I25.2 HISTORY OF MI (MYOCARDIAL INFARCTION): Primary | ICD-10-CM

## 2025-03-11 DIAGNOSIS — I10 BENIGN HYPERTENSION: ICD-10-CM

## 2025-03-11 NOTE — TELEPHONE ENCOUNTER
Caller: LAKESHA LOPEZ    Relationship: Emergency Contact    Best call back number: [phone]     Which medication are you concerned about:     JARDIANCE  10 MG ONCE A DAY    FUROSEMIDE 40 MG ONCE A DAY    SACUBITRIL-BALSARTAN 20-26 MG TWICE A DAY    SPIRONOLACTONE 25 MG ONCE A DAY    METOPROLOL 25 MG TWICE A DAY    Who prescribed you this medication: THESE ARE NEW MEDICATIONS THAT WAS PRESCRIBED TO THE PATIENT WHILE HE WAS IN THE HOSPITAL WHEN HE HAD A HEART ATTACK    When did you start taking this medication: 2/13/2025    What are your concerns: PATIENT IS NEEDING REFILLS ON ALL OF THE ABOVE MEDICATIONS    PLEASE LET PATIENT KNOW WHEN MEDICATIONS HAVE BEEN SENT TO THE PHARMACY    [facility] - [place], [place] - [address] [place] [address] [phone] [facility] [phone]

## 2025-03-12 RX ORDER — SPIRONOLACTONE 25 MG/1
25 TABLET ORAL DAILY
Qty: 30 TABLET | Refills: 0 | Status: SHIPPED | OUTPATIENT
Start: 2025-03-12

## 2025-03-12 RX ORDER — METOPROLOL TARTRATE 25 MG/1
25 TABLET, FILM COATED ORAL 2 TIMES DAILY
Qty: 60 TABLET | Refills: 0 | Status: SHIPPED | OUTPATIENT
Start: 2025-03-12

## 2025-03-12 RX ORDER — FUROSEMIDE 40 MG/1
40 TABLET ORAL DAILY
Qty: 30 TABLET | Refills: 0 | Status: SHIPPED | OUTPATIENT
Start: 2025-03-12

## 2025-03-12 RX ORDER — SACUBITRIL AND VALSARTAN 24; 26 MG/1; MG/1
1 TABLET, FILM COATED ORAL 2 TIMES DAILY
Qty: 60 TABLET | Refills: 0 | Status: SHIPPED | OUTPATIENT
Start: 2025-03-12

## 2025-03-12 NOTE — TELEPHONE ENCOUNTER
Spoke with pt aware of meds sent to pharmacy for Enresto pt had questions n medications did not know it was a combo drug 24-26

## 2025-03-12 NOTE — TELEPHONE ENCOUNTER
Name: LAKESHA LOPEZ      Relationship: Emergency Contact      Best Callback Number: [phone]      HUB PROVIDED THE RELAY MESSAGE FROM THE OFFICE      PATIENT: HAS FURTHER QUESTIONS AND WOULD LIKE A CALL BACK AT THE FOLLOWING PHONE [phone]    ADDITIONAL INFORMATION:   PATIENT CONFIRMED THE MEDICINE DOSE TO BE 24-26.

## 2025-03-12 NOTE — TELEPHONE ENCOUNTER
HUB IS INSTRUCTED TO RELAY BELOW MESSAGE FROM RAUL KILLIAN FOR PT TO CALL OFFICE     Meds have been sent that were listed to his [facility] pharmacy on file. Please advise patient to bring in his medication bottles with him to his upcoming appt with me so we can update his med list.      Please verify that his sacubitril-valsartan dose is 24-26mg tablet  BID. This is what was on patients discharge med list from hospital. The HUB listed it differently so just wanted to double verify. Thanks

## 2025-03-21 ENCOUNTER — OFFICE VISIT (OUTPATIENT)
Dept: FAMILY MEDICINE CLINIC | Facility: CLINIC | Age: 60
End: 2025-03-21
Payer: COMMERCIAL

## 2025-03-21 VITALS
WEIGHT: 201.4 LBS | HEART RATE: 55 BPM | DIASTOLIC BLOOD PRESSURE: 67 MMHG | TEMPERATURE: 97.9 F | HEIGHT: 74 IN | BODY MASS INDEX: 25.85 KG/M2 | SYSTOLIC BLOOD PRESSURE: 102 MMHG | OXYGEN SATURATION: 95 %

## 2025-03-21 DIAGNOSIS — I25.10 ATHEROSCLEROSIS OF NATIVE CORONARY ARTERY OF NATIVE HEART WITHOUT ANGINA PECTORIS: Primary | ICD-10-CM

## 2025-03-21 DIAGNOSIS — E78.2 MIXED HYPERLIPIDEMIA: ICD-10-CM

## 2025-03-21 DIAGNOSIS — E11.65 TYPE 2 DIABETES MELLITUS WITH HYPERGLYCEMIA, WITHOUT LONG-TERM CURRENT USE OF INSULIN: ICD-10-CM

## 2025-03-21 DIAGNOSIS — Z79.899 CONTROLLED SUBSTANCE AGREEMENT SIGNED: ICD-10-CM

## 2025-03-21 DIAGNOSIS — E08.42 DIABETIC POLYNEUROPATHY ASSOCIATED WITH DIABETES MELLITUS DUE TO UNDERLYING CONDITION: ICD-10-CM

## 2025-03-21 DIAGNOSIS — I10 BENIGN HYPERTENSION: ICD-10-CM

## 2025-03-21 DIAGNOSIS — Z76.0 MEDICATION REFILL: ICD-10-CM

## 2025-03-21 DIAGNOSIS — E87.6 HYPOKALEMIA: ICD-10-CM

## 2025-03-21 DIAGNOSIS — Z79.899 MEDICATION MANAGEMENT: ICD-10-CM

## 2025-03-21 LAB
POC AMPHETAMINES: NEGATIVE
POC BARBITURATES: NEGATIVE
POC BENZODIAZEPHINES: NEGATIVE
POC BUPRENORPHINE: NEGATIVE
POC COCAINE: NEGATIVE
POC METHADONE: NEGATIVE
POC METHAMPHETAMINE SCREEN URINE: NEGATIVE
POC OPIATES: NEGATIVE
POC OXYCODONE: NEGATIVE
POC PHENCYCLIDINE: NEGATIVE
POC PROPOXYPHENE: NEGATIVE
POC THC: NEGATIVE
POC TRICYCLIC ANTIDEPRESSANTS: NEGATIVE

## 2025-03-21 PROCEDURE — 82043 UR ALBUMIN QUANTITATIVE: CPT | Performed by: NURSE PRACTITIONER

## 2025-03-21 PROCEDURE — 82570 ASSAY OF URINE CREATININE: CPT | Performed by: NURSE PRACTITIONER

## 2025-03-21 PROCEDURE — 80053 COMPREHEN METABOLIC PANEL: CPT | Performed by: NURSE PRACTITIONER

## 2025-03-21 RX ORDER — NITROGLYCERIN 80 MG/1
1 PATCH TRANSDERMAL DAILY
COMMUNITY
End: 2025-03-21 | Stop reason: SDUPTHER

## 2025-03-21 RX ORDER — METFORMIN HYDROCHLORIDE 500 MG/1
500 TABLET, EXTENDED RELEASE ORAL 4 TIMES DAILY
COMMUNITY

## 2025-03-21 RX ORDER — NITROGLYCERIN 80 MG/1
1 PATCH TRANSDERMAL DAILY
Qty: 30 EACH | Refills: 0 | Status: SHIPPED | OUTPATIENT
Start: 2025-03-21

## 2025-03-21 RX ORDER — CLOPIDOGREL BISULFATE 75 MG/1
75 TABLET ORAL DAILY
COMMUNITY
End: 2025-04-07 | Stop reason: SDUPTHER

## 2025-03-21 NOTE — PROGRESS NOTES
Venipuncture Blood Specimen Collection  Venipuncture performed in LT ARM by Noemi Lee with good hemostasis. Patient tolerated the procedure well without complications.   03/21/25   Noemi Lee

## 2025-03-22 LAB
ALBUMIN SERPL-MCNC: 4.6 G/DL (ref 3.5–5.2)
ALBUMIN UR-MCNC: <1.2 MG/DL
ALBUMIN/GLOB SERPL: 1.9 G/DL
ALP SERPL-CCNC: 61 U/L (ref 39–117)
ALT SERPL W P-5'-P-CCNC: 26 U/L (ref 1–41)
ANION GAP SERPL CALCULATED.3IONS-SCNC: 13.1 MMOL/L (ref 5–15)
AST SERPL-CCNC: 28 U/L (ref 1–40)
BILIRUB SERPL-MCNC: 0.8 MG/DL (ref 0–1.2)
BUN SERPL-MCNC: 11 MG/DL (ref 8–23)
BUN/CREAT SERPL: 14.7 (ref 7–25)
CALCIUM SPEC-SCNC: 9.3 MG/DL (ref 8.6–10.5)
CHLORIDE SERPL-SCNC: 101 MMOL/L (ref 98–107)
CO2 SERPL-SCNC: 25.9 MMOL/L (ref 22–29)
CREAT SERPL-MCNC: 0.75 MG/DL (ref 0.76–1.27)
CREAT UR-MCNC: 31.6 MG/DL
EGFRCR SERPLBLD CKD-EPI 2021: 103.3 ML/MIN/1.73
GLOBULIN UR ELPH-MCNC: 2.4 GM/DL
GLUCOSE SERPL-MCNC: 134 MG/DL (ref 65–99)
MICROALBUMIN/CREAT UR: NORMAL MG/G{CREAT}
POTASSIUM SERPL-SCNC: 3.2 MMOL/L (ref 3.5–5.2)
PROT SERPL-MCNC: 7 G/DL (ref 6–8.5)
SODIUM SERPL-SCNC: 140 MMOL/L (ref 136–145)

## 2025-03-22 RX ORDER — POTASSIUM CHLORIDE 1500 MG/1
20 TABLET, EXTENDED RELEASE ORAL 2 TIMES DAILY
Qty: 30 TABLET | Refills: 0 | Status: SHIPPED | OUTPATIENT
Start: 2025-03-22

## 2025-03-24 ENCOUNTER — TELEPHONE (OUTPATIENT)
Dept: FAMILY MEDICINE CLINIC | Facility: CLINIC | Age: 60
End: 2025-03-24
Payer: COMMERCIAL

## 2025-03-24 NOTE — TELEPHONE ENCOUNTER
HUB to relay description below.      LVM FOR PT TO CALL OFFICE AND SCHEDULE A FASTING LABS BRIAN AROUND MARCH 31ST

## 2025-03-24 NOTE — TELEPHONE ENCOUNTER
[facility] IS INSTRUCTED TO RELAY BELOW MESSAGE       LVM FOR PT TO CALL OFFICE AND SCHEDULE A FASTING LABS BRIAN AROUND MARCH 31ST

## 2025-03-24 NOTE — TELEPHONE ENCOUNTER
Hub staff attempted to follow warm transfer process and was unsuccessful     Caller: Gustavo Singh    Relationship to patient: Self    Best call back number: [phone]    Patient is needing: PATIENT IS RETURNING CALL, PLEASE ADVISE ASAP.

## 2025-03-31 ENCOUNTER — CLINICAL SUPPORT (OUTPATIENT)
Dept: FAMILY MEDICINE CLINIC | Facility: CLINIC | Age: 60
End: 2025-03-31
Payer: COMMERCIAL

## 2025-03-31 DIAGNOSIS — E87.6 HYPOKALEMIA: ICD-10-CM

## 2025-03-31 PROCEDURE — 36415 COLL VENOUS BLD VENIPUNCTURE: CPT | Performed by: NURSE PRACTITIONER

## 2025-03-31 PROCEDURE — 80048 BASIC METABOLIC PNL TOTAL CA: CPT | Performed by: NURSE PRACTITIONER

## 2025-03-31 NOTE — PROGRESS NOTES
Venipuncture Blood Specimen Collection  Venipuncture performed in RT ARM by Noemi Lee with good hemostasis. Patient tolerated the procedure well without complications.   03/31/25   Noemi Lee

## 2025-04-01 ENCOUNTER — TELEPHONE (OUTPATIENT)
Dept: FAMILY MEDICINE CLINIC | Facility: CLINIC | Age: 60
End: 2025-04-01
Payer: COMMERCIAL

## 2025-04-01 LAB
ANION GAP SERPL CALCULATED.3IONS-SCNC: 12 MMOL/L (ref 5–15)
BUN SERPL-MCNC: 12 MG/DL (ref 8–23)
BUN/CREAT SERPL: 14.6 (ref 7–25)
CALCIUM SPEC-SCNC: 9.8 MG/DL (ref 8.6–10.5)
CHLORIDE SERPL-SCNC: 102 MMOL/L (ref 98–107)
CO2 SERPL-SCNC: 27 MMOL/L (ref 22–29)
CREAT SERPL-MCNC: 0.82 MG/DL (ref 0.76–1.27)
EGFRCR SERPLBLD CKD-EPI 2021: 100.6 ML/MIN/1.73
GLUCOSE SERPL-MCNC: 163 MG/DL (ref 65–99)
POTASSIUM SERPL-SCNC: 3.8 MMOL/L (ref 3.5–5.2)
SODIUM SERPL-SCNC: 141 MMOL/L (ref 136–145)

## 2025-04-07 DIAGNOSIS — E11.65 TYPE 2 DIABETES MELLITUS WITH HYPERGLYCEMIA, WITHOUT LONG-TERM CURRENT USE OF INSULIN: ICD-10-CM

## 2025-04-07 RX ORDER — CLOPIDOGREL BISULFATE 75 MG/1
75 TABLET ORAL DAILY
Qty: 90 TABLET | Refills: 0 | Status: SHIPPED | OUTPATIENT
Start: 2025-04-07

## 2025-04-07 NOTE — TELEPHONE ENCOUNTER
Rx Refill Note  Requested Prescriptions     Pending Prescriptions Disp Refills    clopidogrel (PLAVIX) 75 MG tablet 90 tablet 0     Sig: Take 1 tablet by mouth Daily.      Last office visit with prescribing clinician: 3/21/2025   Last telemedicine visit with prescribing clinician: 12/19/2024   Next office visit with prescribing clinician: 6/19/2025                         Would you like a call back once the refill request has been completed: [] Yes [] No    If the office needs to give you a call back, can they leave a voicemail: [] Yes [] No    Noemi Lee Rep  04/07/25, 13:08 EDT

## 2025-04-09 DIAGNOSIS — I25.2 HISTORY OF MI (MYOCARDIAL INFARCTION): ICD-10-CM

## 2025-04-09 DIAGNOSIS — I10 BENIGN HYPERTENSION: ICD-10-CM

## 2025-04-09 DIAGNOSIS — E11.65 TYPE 2 DIABETES MELLITUS WITH HYPERGLYCEMIA, WITHOUT LONG-TERM CURRENT USE OF INSULIN: ICD-10-CM

## 2025-04-09 DIAGNOSIS — E78.2 MIXED HYPERLIPIDEMIA: ICD-10-CM

## 2025-04-09 RX ORDER — SACUBITRIL AND VALSARTAN 24; 26 MG/1; MG/1
1 TABLET, FILM COATED ORAL 2 TIMES DAILY
Qty: 60 TABLET | Refills: 0 | Status: SHIPPED | OUTPATIENT
Start: 2025-04-09

## 2025-04-09 RX ORDER — EMPAGLIFLOZIN 10 MG/1
10 TABLET, FILM COATED ORAL DAILY
Qty: 30 TABLET | Refills: 0 | Status: SHIPPED | OUTPATIENT
Start: 2025-04-09

## 2025-04-09 RX ORDER — METOPROLOL TARTRATE 25 MG/1
25 TABLET, FILM COATED ORAL 2 TIMES DAILY
Qty: 60 TABLET | Refills: 0 | Status: SHIPPED | OUTPATIENT
Start: 2025-04-09

## 2025-04-09 RX ORDER — SPIRONOLACTONE 25 MG/1
25 TABLET ORAL DAILY
Qty: 30 TABLET | Refills: 0 | Status: SHIPPED | OUTPATIENT
Start: 2025-04-09

## 2025-04-09 RX ORDER — FUROSEMIDE 40 MG/1
40 TABLET ORAL DAILY
Qty: 30 TABLET | Refills: 0 | Status: SHIPPED | OUTPATIENT
Start: 2025-04-09

## 2025-04-14 NOTE — PROGRESS NOTES
Gustavo Singh  [id number]  1965  male     03/21/2025      Chief Complaint  Hospital Follow Up Visit    History of Present Illness  59-year-old male patient presents today for hospital follow-up visit.  States he was admitted at [facility] on February 6 due to acute MI.  States he was discharged and then readmitted on February 10 due to acute hypoxia and hospital-acquired pneumonia.  States he is doing much better.  Seen his cardiologist Dr. Lee on 02/24 with good report.    Denies fever, chills, body aches, headache, lightheadedness, dizziness, numbness, tingling, cough, shortness of breath, chest pain, palpitations, leg swelling, abdominal pain, NVD, dysuria, rash.              Review of Systems   Constitutional: Negative.    HENT: Negative.     Eyes: Negative.    Respiratory: Negative.     Cardiovascular: Negative.    Gastrointestinal: Negative.    Endocrine: Negative.    Genitourinary: Negative.    Musculoskeletal: Negative.    Skin: Negative.    Allergic/Immunologic: Negative.    Neurological: Negative.    Hematological: Negative.    Psychiatric/Behavioral: Negative.         Past Medical History:   Diagnosis Date    Allergies     eggs, corn, green beans- immunotherapy in childhood    Diabetes mellitus     Hyperlipidemia     Hypertension     CHASITY (obstructive sleep apnea)     Tobacco chew use     quit in 1985    Wears dentures     upper    Wears glasses        Past Surgical History:   Procedure Laterality Date    CARDIAC CATHETERIZATION  10/2018    CHD, 1 stent placement    CIRCUMCISION  08/1964    COLONOSCOPY  1985    HEEL SPUR SURGERY Left 2002    TONSILLECTOMY AND ADENOIDECTOMY  1981    WISDOM TOOTH EXTRACTION         Family History   Problem Relation Age of Onset    Hypertension Mother     Hyperlipidemia Mother     Liver cancer Mother     Depression Father     Alcohol abuse Father     Hypertension Sister     Diabetes Sister     Hypertension Sister     Diabetes Sister     Liver cancer Sister     No Known  "Problems Daughter     Heart attack Son     No Known Problems Maternal Grandmother     Stroke Maternal Grandfather     Stroke Paternal Grandmother     No Known Problems Paternal Grandfather        Social History     Socioeconomic History    Marital status:    Tobacco Use    Smoking status: Never     Passive exposure: Never    Smokeless tobacco: Former     Types: Chew     Quit date: 1990   Vaping Use    Vaping status: Never Used   Substance and Sexual Activity    Alcohol use: Not Currently    Drug use: Not Currently    Sexual activity: Defer        Allergies   Allergen Reactions    Ticagrelor Shortness Of Breath    Tenoretic [Atenolol-Chlorthalidone] Other (See Comments)     ED    Egg-Derived Products Other (See Comments)     Positive allergy test    Lisinopril Other (See Comments)     drowsy    Penicillins Hives and Itching         Objective   Vital Signs:   /67 (BP Location: Left arm, Patient Position: Sitting, Cuff Size: Adult)   Pulse 55   Temp 97.9 °F (36.6 °C) (Oral)   Ht 188 cm (74.02\")   Wt 91.4 kg (201 lb 6.4 oz)   SpO2 95%   BMI 25.85 kg/m²       Physical Exam  Vitals and nursing note reviewed.   Constitutional:       General: He is not in acute distress.     Appearance: Normal appearance. He is not ill-appearing, toxic-appearing or diaphoretic.   HENT:      Head: Normocephalic and atraumatic.      Jaw: There is normal jaw occlusion.      Right Ear: Hearing and external ear normal.      Left Ear: Hearing and external ear normal.      Nose: Nose normal.      Mouth/Throat:      Lips: Pink.   Eyes:      General: Lids are normal. Vision grossly intact. Gaze aligned appropriately.      Extraocular Movements: Extraocular movements intact.      Conjunctiva/sclera: Conjunctivae normal.      Pupils: Pupils are equal, round, and reactive to light.   Cardiovascular:      Rate and Rhythm: Normal rate and regular rhythm.      Pulses: Normal pulses.           Carotid pulses are 2+ on the right side and " 2+ on the left side.       Radial pulses are 2+ on the right side and 2+ on the left side.        Dorsalis pedis pulses are 2+ on the right side and 2+ on the left side.        Posterior tibial pulses are 2+ on the right side and 2+ on the left side.      Heart sounds: Normal heart sounds, S1 normal and S2 normal. No murmur heard.  Pulmonary:      Effort: Pulmonary effort is normal.      Breath sounds: Normal breath sounds and air entry.   Abdominal:      General: Abdomen is flat. Bowel sounds are normal. There is no distension or abdominal bruit.      Palpations: Abdomen is soft.      Tenderness: There is no abdominal tenderness.   Musculoskeletal:         General: Normal range of motion.      Cervical back: Full passive range of motion without pain, normal range of motion and neck supple.      Right lower leg: No edema.      Left lower leg: No edema.   Skin:     General: Skin is warm and dry.      Capillary Refill: Capillary refill takes less than 2 seconds.      Coloration: Skin is not pale.      Findings: No bruising, erythema or rash.   Neurological:      General: No focal deficit present.      Mental Status: He is alert and oriented to person, place, and time. Mental status is at baseline.      GCS: GCS eye subscore is 4. GCS verbal subscore is 5. GCS motor subscore is 6.      Cranial Nerves: Cranial nerves 2-12 are intact. No cranial nerve deficit.      Sensory: Sensation is intact. No sensory deficit.      Motor: Motor function is intact. No weakness.      Coordination: Coordination is intact. Coordination normal.      Gait: Gait is intact. Gait normal.      Deep Tendon Reflexes: Reflexes normal.   Psychiatric:         Attention and Perception: Attention and perception normal.         Mood and Affect: Mood and affect normal.         Speech: Speech normal.         Behavior: Behavior normal. Behavior is cooperative.         Thought Content: Thought content normal.         Cognition and Memory: Cognition and  memory normal.         Judgment: Judgment normal.                 Assessment and Plan   Diagnoses and all orders for this visit:    1. Atherosclerosis of native coronary artery of native heart without angina pectoris (Primary)  -     Comprehensive metabolic panel  -     nitroglycerin (NITRODUR) 0.4 MG/HR patch; Place 1 patch on the skin as directed by provider Daily.  Dispense: 30 each; Refill: 0    2. Benign hypertension  -     Comprehensive metabolic panel    3. Mixed hyperlipidemia  -     Comprehensive metabolic panel    4. Hypokalemia  -     Comprehensive metabolic panel  -     potassium chloride (KLOR-CON M20) 20 MEQ CR tablet; Take 1 tablet by mouth 2 (Two) Times a Day.  Dispense: 30 tablet; Refill: 0  -     Basic metabolic panel; Future  -     Basic metabolic panel; Future    5. Type 2 diabetes mellitus with hyperglycemia, without long-term current use of insulin  -     Comprehensive metabolic panel  -     Microalbumin / Creatinine Urine Ratio - Urine, Clean Catch    6. Controlled substance agreement signed  -     POC Urine Drug Screen, Triage    7. Medication management  -     POC Urine Drug Screen, Triage    8. Diabetic polyneuropathy associated with diabetes mellitus due to underlying condition  -     POC Urine Drug Screen, Triage    9. Medication refill  -     nitroglycerin (NITRODUR) 0.4 MG/HR patch; Place 1 patch on the skin as directed by provider Daily.  Dispense: 30 each; Refill: 0      Diabetic polyneuropathy  -Stable. Continue current dose of gabapentin.  -UDS negative today.   -[facility] controlled substance policy and patient obligations discussed with patient.   -CSA signed today.     Type 2 diabetes  - Last A1c 6.7 on February 10, 2025.  Continue current meds.  -Patient to continue avoiding sweets, candy, pastries, carbs, pasta, sweetened beverages and consume more water, fruits, vegetables.  -Patient to continue monitoring glucose at home daily and call my office for glucose readings less than  70 or greater than 200.    Hypokalemia  - Start potassium chloride.  - Will monitor.    Mixed hyperlipidemia  - On rosuvastatin.    Hypertension  - Stable.  Continue current meds.  - Sees cardiology.    Atherosclerosis of coronary artery  - Stable.  Continue current dose of nitroglycerin.    -Discussed ER red flags.  - Patient to follow-up with me in 3 months for diabetes.    Follow Up   Return in about 3 months (around 6/21/2025) for Recheck.    There are no Patient Instructions on file for this visit.

## 2025-04-16 ENCOUNTER — CLINICAL SUPPORT (OUTPATIENT)
Dept: FAMILY MEDICINE CLINIC | Facility: CLINIC | Age: 60
End: 2025-04-16
Payer: COMMERCIAL

## 2025-04-16 DIAGNOSIS — E87.6 HYPOKALEMIA: ICD-10-CM

## 2025-04-16 PROCEDURE — 80048 BASIC METABOLIC PNL TOTAL CA: CPT | Performed by: NURSE PRACTITIONER

## 2025-04-16 NOTE — PROGRESS NOTES
Venipuncture Blood Specimen Collection  Venipuncture performed in RT ARM by Noemi Lee with good hemostasis. Patient tolerated the procedure well without complications.   04/16/25   Noemi Lee

## 2025-04-17 LAB
ANION GAP SERPL CALCULATED.3IONS-SCNC: 13 MMOL/L (ref 5–15)
BUN SERPL-MCNC: 14 MG/DL (ref 8–23)
BUN/CREAT SERPL: 17.7 (ref 7–25)
CALCIUM SPEC-SCNC: 9.6 MG/DL (ref 8.6–10.5)
CHLORIDE SERPL-SCNC: 102 MMOL/L (ref 98–107)
CO2 SERPL-SCNC: 26 MMOL/L (ref 22–29)
CREAT SERPL-MCNC: 0.79 MG/DL (ref 0.76–1.27)
EGFRCR SERPLBLD CKD-EPI 2021: 101.7 ML/MIN/1.73
GLUCOSE SERPL-MCNC: 162 MG/DL (ref 65–99)
POTASSIUM SERPL-SCNC: 3.5 MMOL/L (ref 3.5–5.2)
SODIUM SERPL-SCNC: 141 MMOL/L (ref 136–145)

## 2025-04-28 ENCOUNTER — OFFICE VISIT (OUTPATIENT)
Dept: FAMILY MEDICINE CLINIC | Facility: CLINIC | Age: 60
End: 2025-04-28
Payer: COMMERCIAL

## 2025-04-28 VITALS
TEMPERATURE: 98.3 F | SYSTOLIC BLOOD PRESSURE: 143 MMHG | DIASTOLIC BLOOD PRESSURE: 85 MMHG | OXYGEN SATURATION: 96 % | HEART RATE: 60 BPM | WEIGHT: 203.8 LBS | HEIGHT: 74 IN | BODY MASS INDEX: 26.15 KG/M2 | RESPIRATION RATE: 16 BRPM

## 2025-04-28 DIAGNOSIS — S90.31XA CONTUSION OF RIGHT FOOT, INITIAL ENCOUNTER: ICD-10-CM

## 2025-04-28 DIAGNOSIS — L03.115 CELLULITIS OF RIGHT LOWER EXTREMITY: Primary | ICD-10-CM

## 2025-04-28 RX ORDER — SULFAMETHOXAZOLE AND TRIMETHOPRIM 800; 160 MG/1; MG/1
1 TABLET ORAL 2 TIMES DAILY
Qty: 14 TABLET | Refills: 0 | Status: SHIPPED | OUTPATIENT
Start: 2025-04-28 | End: 2025-05-05

## 2025-04-28 NOTE — PROGRESS NOTES
"Chief Complaint  Foot Injury (RT shin and ankle pain and bruising. Pt fell and injured the RT lower leg 1 week ago.)    Subjective    History of Present Illness {CC  Problem List  Visit  Diagnosis   Encounters  Notes  Medications  Labs  Result Review Imaging  Media :23}     Gustavo Singh presents to [facility] for Foot Injury (RT shin and ankle pain and bruising. Pt fell and injured the RT lower leg 1 week ago.).           History of Present Illness  The patient presents for evaluation of a right leg injury.    They sustained an injury to their right leg approximately 1 week ago while at work, which they initially dismissed as a minor scrape. The incident involved a fall into a pit, during which they landed on a couch and struck their knee against a concrete surface. Despite the impact, no immediate pain or bleeding was experienced, and they continued with their workday. Over the past week, a progressive worsening of the condition has been observed, characterized by increased soreness and the development of redness and a bruise. No drainage. A sensation of warmth in the affected area was particularly pronounced this morning. Earlier today, a sharp, poking pain in the leg was experienced while driving, which resolved upon movement. Full weight can be borne on the leg, and a fracture is not suspected. No calf pain is reported. They are currently on Plavix and aspirin therapy following a myocardial infarction in 02/2025, which may be contributing to the bruising. The last tetanus vaccine was administered in 06/2024. They are allergic to penicillin.    PAST SURGICAL HISTORY:  - Myocardial infarction with stent placement in 02/2025    .    Objective     Vital Signs:   /85   Pulse 60   Temp 98.3 °F (36.8 °C) (Temporal)   Resp 16   Ht 188 cm (74\")   Wt 92.4 kg (203 lb 12.8 oz)   SpO2 96%   BMI 26.17 kg/m²   Current Outpatient Medications on File Prior to Visit "   Medication Sig Dispense Refill    albuterol sulfate  (90 Base) MCG/ACT inhaler Inhale 2 puffs Every 4 (Four) Hours As Needed for Shortness of Air (cough). 18 g 0    amLODIPine (NORVASC) 10 MG tablet TAKE ONE TABLET BY MOUTH DAILY 90 tablet 0    ASPIRIN 81 PO Take 81 mg by mouth Daily.      betamethasone dipropionate (DIPROLENE) 0.05 % ointment Apply 1 Application topically to the appropriate area as directed 2 (Two) Times a Day.      clopidogrel (PLAVIX) 75 MG tablet Take 1 tablet by mouth Daily. 90 tablet 0    cyclobenzaprine (FLEXERIL) 5 MG tablet Take 1 tablet by mouth 2 (Two) Times a Day As Needed for Muscle Spasms. 20 tablet 1    Entresto 24-26 MG tablet TAKE ONE TABLET BY MOUTH TWICE DAILY 60 tablet 0    fexofenadine (ALLEGRA) 180 MG tablet Take 1 tablet by mouth As Needed.      furosemide (LASIX) 40 MG tablet TAKE ONE TABLET BY MOUTH DAILY 30 tablet 0    gabapentin (NEURONTIN) 400 MG capsule TAKE ONE CAPSULE BY MOUTH TWICE DAILY 180 capsule 0    Jardiance 10 MG tablet tablet TAKE ONE TABLET BY MOUTH DAILY 30 tablet 0    metFORMIN ER (GLUCOPHAGE-XR) 500 MG 24 hr tablet Take 1 tablet by mouth 4 (Four) Times a Day. 2 500 mg in the morning and at night      metoprolol tartrate (LOPRESSOR) 25 MG tablet TAKE ONE TABLET BY MOUTH TWICE DAILY 60 tablet 0    montelukast (SINGULAIR) 10 MG tablet TAKE ONE TABLET BY MOUTH IN THE EVENING AS NEEDED 90 tablet 3    niacin (NIASPAN) 500 MG CR tablet Take 1 tablet by mouth Daily.      nitroglycerin (NITRODUR) 0.4 MG/HR patch Place 1 patch on the skin as directed by provider Daily. 30 each 0    potassium chloride (KLOR-CON M20) 20 MEQ CR tablet Take 1 tablet by mouth Daily. 30 tablet 0    rosuvastatin (CRESTOR) 40 MG tablet TAKE ONE TABLET BY MOUTH DAILY 90 tablet 0    spironolactone (ALDACTONE) 25 MG tablet TAKE ONE TABLET BY MOUTH DAILY 30 tablet 0     No current facility-administered medications on file prior to visit.        Past Medical History:   Diagnosis Date     Allergies     eggs, corn, green beans- immunotherapy in childhood    Diabetes mellitus     Hyperlipidemia     Hypertension     CHASITY (obstructive sleep apnea)     Tobacco chew use     quit in 1985    Wears dentures     upper    Wears glasses       Past Surgical History:   Procedure Laterality Date    CARDIAC CATHETERIZATION  10/2018    CHD, 1 stent placement    CIRCUMCISION  08/1964    COLONOSCOPY  1985    HEEL SPUR SURGERY Left 2002    TONSILLECTOMY AND ADENOIDECTOMY  1981    WISDOM TOOTH EXTRACTION        Family History   Problem Relation Age of Onset    Hypertension Mother     Hyperlipidemia Mother     Liver cancer Mother     Depression Father     Alcohol abuse Father     Hypertension Sister     Diabetes Sister     Hypertension Sister     Diabetes Sister     Liver cancer Sister     No Known Problems Daughter     Heart attack Son     No Known Problems Maternal Grandmother     Stroke Maternal Grandfather     Stroke Paternal Grandmother     No Known Problems Paternal Grandfather       Social History     Socioeconomic History    Marital status:    Tobacco Use    Smoking status: Never     Passive exposure: Never    Smokeless tobacco: Former     Types: Chew     Quit date: 1990   Vaping Use    Vaping status: Never Used   Substance and Sexual Activity    Alcohol use: Not Currently    Drug use: Not Currently    Sexual activity: Defer         Clinical Support on 04/16/2025   Component Date Value Ref Range Status    Glucose 04/16/2025 162 (H)  65 - 99 mg/dL Final    BUN 04/16/2025 14  8 - 23 mg/dL Final    Creatinine 04/16/2025 0.79  0.76 - 1.27 mg/dL Final    Sodium 04/16/2025 141  136 - 145 mmol/L Final    Potassium 04/16/2025 3.5  3.5 - 5.2 mmol/L Final    Chloride 04/16/2025 102  98 - 107 mmol/L Final    CO2 04/16/2025 26.0  22.0 - 29.0 mmol/L Final    Calcium 04/16/2025 9.6  8.6 - 10.5 mg/dL Final    BUN/Creatinine Ratio 04/16/2025 17.7  7.0 - 25.0 Final    Anion Gap 04/16/2025 13.0  5.0 - 15.0 mmol/L Final     eGFR 04/16/2025 101.7  >60.0 mL/min/1.73 Final   Clinical Support on 03/31/2025   Component Date Value Ref Range Status    Glucose 03/31/2025 163 (H)  65 - 99 mg/dL Final    BUN 03/31/2025 12  8 - 23 mg/dL Final    Creatinine 03/31/2025 0.82  0.76 - 1.27 mg/dL Final    Sodium 03/31/2025 141  136 - 145 mmol/L Final    Potassium 03/31/2025 3.8  3.5 - 5.2 mmol/L Final    Chloride 03/31/2025 102  98 - 107 mmol/L Final    CO2 03/31/2025 27.0  22.0 - 29.0 mmol/L Final    Calcium 03/31/2025 9.8  8.6 - 10.5 mg/dL Final    BUN/Creatinine Ratio 03/31/2025 14.6  7.0 - 25.0 Final    Anion Gap 03/31/2025 12.0  5.0 - 15.0 mmol/L Final    eGFR 03/31/2025 100.6  >60.0 mL/min/1.73 Final   Office Visit on 03/21/2025   Component Date Value Ref Range Status    Glucose 03/21/2025 134 (H)  65 - 99 mg/dL Final    BUN 03/21/2025 11  8 - 23 mg/dL Final    Creatinine 03/21/2025 0.75 (L)  0.76 - 1.27 mg/dL Final    Sodium 03/21/2025 140  136 - 145 mmol/L Final    Potassium 03/21/2025 3.2 (L)  3.5 - 5.2 mmol/L Final    Chloride 03/21/2025 101  98 - 107 mmol/L Final    CO2 03/21/2025 25.9  22.0 - 29.0 mmol/L Final    Calcium 03/21/2025 9.3  8.6 - 10.5 mg/dL Final    Total Protein 03/21/2025 7.0  6.0 - 8.5 g/dL Final    Albumin 03/21/2025 4.6  3.5 - 5.2 g/dL Final    ALT (SGPT) 03/21/2025 26  1 - 41 U/L Final    AST (SGOT) 03/21/2025 28  1 - 40 U/L Final    Alkaline Phosphatase 03/21/2025 61  39 - 117 U/L Final    Total Bilirubin 03/21/2025 0.8  0.0 - 1.2 mg/dL Final    Globulin 03/21/2025 2.4  gm/dL Final    A/G Ratio 03/21/2025 1.9  g/dL Final    BUN/Creatinine Ratio 03/21/2025 14.7  7.0 - 25.0 Final    Anion Gap 03/21/2025 13.1  5.0 - 15.0 mmol/L Final    eGFR 03/21/2025 103.3  >60.0 mL/min/1.73 Final    Microalbumin/Creatinine Ratio 03/21/2025    Final    Unable to calculate    Creatinine, Urine 03/21/2025 31.6  mg/dL Final    Microalbumin, Urine 03/21/2025 <1.2  mg/dL Final    Methamphetaine Screen, Urine 03/21/2025 Negative  Negative Final  "   POC Amphetamines 03/21/2025 Negative  Negative Final    Barbiturates Screen 03/21/2025 Negative  Negative Final    Benzodiazepine Screen 03/21/2025 Negative  Negative Final    Cocaine Screen 03/21/2025 Negative  Negative Final    Methadone Screen 03/21/2025 Negative  Negative Final    Opiate Screen 03/21/2025 Negative  Negative Final    Oxycodone, Screen 03/21/2025 Negative  Negative Final    Phencyclidine (PCP) Screen 03/21/2025 Negative  Negative Final    Propoxyphene Screen 03/21/2025 Negative  Negative Final    THC, Screen 03/21/2025 Negative  Negative Final    Tricyclic Antidepressants Screen 03/21/2025 Negative  Negative Final    Buprenorphine Screen 03/21/2025 Negative   Final         Physical Exam  Constitutional:       Appearance: Normal appearance.   HENT:      Head: Normocephalic and atraumatic.   Eyes:      General: No scleral icterus.     Extraocular Movements: Extraocular movements intact.   Cardiovascular:      Rate and Rhythm: Normal rate and regular rhythm.      Pulses: Normal pulses.      Heart sounds: Normal heart sounds. No murmur heard.     No friction rub. No gallop.   Pulmonary:      Effort: Pulmonary effort is normal.      Breath sounds: Normal breath sounds. No wheezing, rhonchi or rales.   Abdominal:      General: Abdomen is flat. Bowel sounds are normal.      Palpations: Abdomen is soft.      Tenderness: There is no abdominal tenderness. There is no right CVA tenderness or left CVA tenderness.   Musculoskeletal:      Cervical back: Neck supple.   Skin:     General: Skin is warm and dry.      Comments: 6\" x 3\" area of erythema on the right anterior lower leg just below the knee to the ankle, bruising present just under the right medial malleolus. No significant pain to palpation over the right tibia or fibula. No calf pain or swelling.    Neurological:      Mental Status: He is alert. Mental status is at baseline.      Coordination: Coordination normal.      Gait: Gait normal. "   Psychiatric:         Mood and Affect: Mood normal.         Behavior: Behavior normal.         Thought Content: Thought content normal.         Judgment: Judgment normal.          Result Review  Data Reviewed:{ Labs  Result Review  Imaging  Med Tab  Media :23}   I have reviewed this patient's chart.  I have reviewed previous labs, previous imaging, previous medications, and previous encounters with notes that were available in this patient's chart.               Assessment and Plan {CC Problem List  Visit Diagnosis  ROS  Review (Popup)  Health Maintenance  Quality  BestPractice  Medications  SmartSets  SnapShot Encounters  Media :23}      Cellulitis of right lower extremity    Orders:    sulfamethoxazole-trimethoprim (Bactrim DS) 800-160 MG per tablet; Take 1 tablet by mouth 2 (Two) Times a Day for 7 days.    Contusion of right foot, initial encounter              Assessment & Plan    - Symptoms: warmth, soreness, and bruising of the right leg, likely due to a scrape from a fall at work  - Physical exam findings: 6-1/2 inches of erythema on the right leg and right foot bruising  - Discussed the importance of monitoring for fever or worsening symptoms  - Prescribed Bactrim 800 mg/160 mg, choice made d/t allergy  - Advised to report any signs of fever or worsening condition        -ER red flags discussed with patient including risk versus benefit and education provided.  -Follow-up with me      Follow Up {Instructions Charge Capture  Follow-up Communications :23}     Patient was given instructions and counseling regarding his condition or for health maintenance advice. Please see specific information pulled into the AVS (placed there by myself) if appropriate.    No follow-ups on file.      Lakeshia Oconnor PA-C      Patient or patient representative verbalized consent for the use of Ambient Listening during the visit with  Lakeshia Oconnor PA-C for chart documentation. 4/28/2025  13:08 EDT

## 2025-05-01 ENCOUNTER — CLINICAL SUPPORT (OUTPATIENT)
Dept: FAMILY MEDICINE CLINIC | Facility: CLINIC | Age: 60
End: 2025-05-01
Payer: COMMERCIAL

## 2025-05-01 DIAGNOSIS — D50.9 IRON DEFICIENCY ANEMIA, UNSPECIFIED IRON DEFICIENCY ANEMIA TYPE: ICD-10-CM

## 2025-05-01 DIAGNOSIS — D69.6 THROMBOCYTOPENIA: ICD-10-CM

## 2025-05-01 LAB
BASOPHILS # BLD AUTO: 0.04 10*3/MM3 (ref 0–0.2)
BASOPHILS NFR BLD AUTO: 0.8 % (ref 0–1.5)
DEPRECATED RDW RBC AUTO: 48.4 FL (ref 37–54)
EOSINOPHIL # BLD AUTO: 0.1 10*3/MM3 (ref 0–0.4)
EOSINOPHIL NFR BLD AUTO: 2 % (ref 0.3–6.2)
ERYTHROCYTE [DISTWIDTH] IN BLOOD BY AUTOMATED COUNT: 14.3 % (ref 12.3–15.4)
FERRITIN SERPL-MCNC: 89.4 NG/ML (ref 30–400)
HCT VFR BLD AUTO: 44.5 % (ref 37.5–51)
HGB BLD-MCNC: 14.4 G/DL (ref 13–17.7)
IMM GRANULOCYTES # BLD AUTO: 0.01 10*3/MM3 (ref 0–0.05)
IMM GRANULOCYTES NFR BLD AUTO: 0.2 % (ref 0–0.5)
IRON 24H UR-MRATE: 65 MCG/DL (ref 59–158)
IRON SATN MFR SERPL: 15 % (ref 20–50)
LYMPHOCYTES # BLD AUTO: 1.5 10*3/MM3 (ref 0.7–3.1)
LYMPHOCYTES NFR BLD AUTO: 30.4 % (ref 19.6–45.3)
MCH RBC QN AUTO: 30.3 PG (ref 26.6–33)
MCHC RBC AUTO-ENTMCNC: 32.4 G/DL (ref 31.5–35.7)
MCV RBC AUTO: 93.7 FL (ref 79–97)
MONOCYTES # BLD AUTO: 0.49 10*3/MM3 (ref 0.1–0.9)
MONOCYTES NFR BLD AUTO: 9.9 % (ref 5–12)
NEUTROPHILS NFR BLD AUTO: 2.79 10*3/MM3 (ref 1.7–7)
NEUTROPHILS NFR BLD AUTO: 56.7 % (ref 42.7–76)
NRBC BLD AUTO-RTO: 0 /100 WBC (ref 0–0.2)
PLATELET # BLD AUTO: 130 10*3/MM3 (ref 140–450)
PMV BLD AUTO: 11.2 FL (ref 6–12)
RBC # BLD AUTO: 4.75 10*6/MM3 (ref 4.14–5.8)
RETICS # AUTO: 0.11 10*6/MM3 (ref 0.02–0.13)
RETICS/RBC NFR AUTO: 2.31 % (ref 0.7–1.9)
TIBC SERPL-MCNC: 426 MCG/DL (ref 298–536)
TRANSFERRIN SERPL-MCNC: 286 MG/DL (ref 200–360)
WBC NRBC COR # BLD AUTO: 4.93 10*3/MM3 (ref 3.4–10.8)

## 2025-05-01 PROCEDURE — 84466 ASSAY OF TRANSFERRIN: CPT | Performed by: STUDENT IN AN ORGANIZED HEALTH CARE EDUCATION/TRAINING PROGRAM

## 2025-05-01 PROCEDURE — 82728 ASSAY OF FERRITIN: CPT | Performed by: STUDENT IN AN ORGANIZED HEALTH CARE EDUCATION/TRAINING PROGRAM

## 2025-05-01 PROCEDURE — 85045 AUTOMATED RETICULOCYTE COUNT: CPT | Performed by: STUDENT IN AN ORGANIZED HEALTH CARE EDUCATION/TRAINING PROGRAM

## 2025-05-01 PROCEDURE — 83540 ASSAY OF IRON: CPT | Performed by: STUDENT IN AN ORGANIZED HEALTH CARE EDUCATION/TRAINING PROGRAM

## 2025-05-01 PROCEDURE — 85025 COMPLETE CBC W/AUTO DIFF WBC: CPT | Performed by: STUDENT IN AN ORGANIZED HEALTH CARE EDUCATION/TRAINING PROGRAM

## 2025-05-01 PROCEDURE — 82746 ASSAY OF FOLIC ACID SERUM: CPT | Performed by: STUDENT IN AN ORGANIZED HEALTH CARE EDUCATION/TRAINING PROGRAM

## 2025-05-01 PROCEDURE — 82607 VITAMIN B-12: CPT | Performed by: STUDENT IN AN ORGANIZED HEALTH CARE EDUCATION/TRAINING PROGRAM

## 2025-05-01 NOTE — PROGRESS NOTES
Venipuncture Blood Specimen Collection  Venipuncture performed in [facility] by Noemi Lee with good hemostasis. Patient tolerated the procedure well without complications.   05/01/25   Noemi Lee

## 2025-05-02 LAB
FOLATE SERPL-MCNC: 12.3 NG/ML (ref 4.78–24.2)
VIT B12 BLD-MCNC: 237 PG/ML (ref 211–946)

## 2025-05-06 ENCOUNTER — TELEPHONE (OUTPATIENT)
Dept: FAMILY MEDICINE CLINIC | Facility: CLINIC | Age: 60
End: 2025-05-06
Payer: COMMERCIAL

## 2025-05-06 DIAGNOSIS — E11.65 TYPE 2 DIABETES MELLITUS WITH HYPERGLYCEMIA, WITHOUT LONG-TERM CURRENT USE OF INSULIN: ICD-10-CM

## 2025-05-06 DIAGNOSIS — I25.2 HISTORY OF MI (MYOCARDIAL INFARCTION): ICD-10-CM

## 2025-05-06 DIAGNOSIS — E78.2 MIXED HYPERLIPIDEMIA: ICD-10-CM

## 2025-05-06 DIAGNOSIS — I10 BENIGN HYPERTENSION: ICD-10-CM

## 2025-05-06 RX ORDER — EMPAGLIFLOZIN 10 MG/1
10 TABLET, FILM COATED ORAL DAILY
Qty: 30 TABLET | Refills: 0 | Status: SHIPPED | OUTPATIENT
Start: 2025-05-06

## 2025-05-06 RX ORDER — METOPROLOL TARTRATE 25 MG/1
25 TABLET, FILM COATED ORAL 2 TIMES DAILY
Qty: 60 TABLET | Refills: 0 | Status: SHIPPED | OUTPATIENT
Start: 2025-05-06

## 2025-05-06 RX ORDER — SACUBITRIL AND VALSARTAN 24; 26 MG/1; MG/1
1 TABLET, FILM COATED ORAL 2 TIMES DAILY
Qty: 60 TABLET | Refills: 0 | Status: SHIPPED | OUTPATIENT
Start: 2025-05-06

## 2025-05-06 RX ORDER — SPIRONOLACTONE 25 MG/1
25 TABLET ORAL DAILY
Qty: 30 TABLET | Refills: 0 | Status: SHIPPED | OUTPATIENT
Start: 2025-05-06

## 2025-05-06 RX ORDER — FUROSEMIDE 40 MG/1
40 TABLET ORAL DAILY
Qty: 30 TABLET | Refills: 0 | Status: SHIPPED | OUTPATIENT
Start: 2025-05-06

## 2025-05-06 NOTE — TELEPHONE ENCOUNTER
Caller: Gustavo Singh    Relationship: Self    Best call back number: [phone]     PATIENT IS REQUESTING TO ADD REFILLS TO THESE MEDICATIONS SINCE HE WILL BE ON THEM FOR A WHILE.

## 2025-05-06 NOTE — TELEPHONE ENCOUNTER
Rx Refill Note  Requested Prescriptions     Pending Prescriptions Disp Refills    furosemide (LASIX) 40 MG tablet [Pharmacy Med Name: furosemide 40 mg tablet] 30 tablet 0     Sig: TAKE ONE TABLET BY MOUTH DAILY    Jardiance 10 MG tablet tablet [Pharmacy Med Name: Jardiance 10 mg tablet] 30 tablet 0     Sig: TAKE ONE TABLET BY MOUTH DAILY    spironolactone (ALDACTONE) 25 MG tablet [Pharmacy Med Name: spironolactone 25 mg tablet] 30 tablet 0     Sig: TAKE ONE TABLET BY MOUTH DAILY    metoprolol tartrate (LOPRESSOR) 25 MG tablet [Pharmacy Med Name: metoprolol tartrate 25 mg tablet] 60 tablet 0     Sig: TAKE ONE TABLET BY MOUTH TWICE DAILY    Entresto 24-26 MG tablet [Pharmacy Med Name: Entresto 24 mg-26 mg tablet] 60 tablet 0     Sig: TAKE ONE TABLET BY MOUTH TWICE DAILY      Last office visit with prescribing clinician: 3/21/2025   Last telemedicine visit with prescribing clinician: 12/19/2024   Next office visit with prescribing clinician: 5/6/2025         Antihyperglycemics Protocol [id number]/06/2025 02:15 PM   Protocol Details HgA1c in past 6 months        Jardiance protocal failed.     Mireya Mcdonough MA  05/06/25, 14:17 EDT

## 2025-05-13 ENCOUNTER — RESULTS FOLLOW-UP (OUTPATIENT)
Dept: ONCOLOGY | Facility: CLINIC | Age: 60
End: 2025-05-13
Payer: COMMERCIAL

## 2025-05-13 DIAGNOSIS — E08.42 DIABETIC POLYNEUROPATHY ASSOCIATED WITH DIABETES MELLITUS DUE TO UNDERLYING CONDITION: ICD-10-CM

## 2025-05-13 RX ORDER — GABAPENTIN 400 MG/1
400 CAPSULE ORAL EVERY 12 HOURS SCHEDULED
Qty: 180 CAPSULE | Refills: 0 | Status: SHIPPED | OUTPATIENT
Start: 2025-05-13

## 2025-05-13 NOTE — TELEPHONE ENCOUNTER
Rx Refill Note  Requested Prescriptions     Pending Prescriptions Disp Refills    gabapentin (NEURONTIN) 400 MG capsule [Pharmacy Med Name: gabapentin 400 mg capsule] 180 capsule 0     Sig: TAKE ONE CAPSULE BY MOUTH TWICE DAILY      Last office visit with prescribing clinician: 03/21/2025  Last telemedicine visit with prescribing clinician: 12/19/2024   Next office visit with prescribing clinician: 06/19/2025       UDS 03/21/2025  CSA 03/21/2025      INSPECT - SCAN - Inspect/[facility], [place] 05/13/2025 (05/13/2025)       eJssica Pacheco MA  05/13/25, 08:59 EDT

## 2025-05-19 DIAGNOSIS — E11.65 TYPE 2 DIABETES MELLITUS WITH HYPERGLYCEMIA, WITHOUT LONG-TERM CURRENT USE OF INSULIN: Primary | ICD-10-CM

## 2025-05-19 RX ORDER — METFORMIN HYDROCHLORIDE 500 MG/1
1000 TABLET, EXTENDED RELEASE ORAL 2 TIMES DAILY WITH MEALS
Qty: 360 TABLET | Refills: 0 | Status: SHIPPED | OUTPATIENT
Start: 2025-05-19

## 2025-05-19 NOTE — TELEPHONE ENCOUNTER
Rx Refill Note  Requested Prescriptions     Pending Prescriptions Disp Refills    metFORMIN (GLUCOPHAGE) 500 MG tablet [Pharmacy Med Name: metformin 500 mg tablet] 270 tablet 0     Sig: TAKE ONE TABLET BY MOUTH THREE TIMES DAILY WITH MEALS      Last office visit with prescribing clinician: 3/21/2025   Last telemedicine visit with prescribing clinician: 12/19/2024   Next office visit with prescribing clinician: 6/19/2025                         Lakeshia saw this pt 4/2025. Protocol failed.    Mireya Mcdonough MA  05/19/25, 11:43 EDT

## 2025-05-30 NOTE — PROGRESS NOTES
HEMATOLOGY ONCOLOGY OUTPATIENT FOLLOW UP       Patient name: Gustavo Singh  : 1965  MRN: [id number]  Primary Care Physician: Pratik Martinez APRN  Referring Physician: No ref. provider found  Reason For Consult:       History of Present Illness:  Patient is a 59 y.o. male who has been referred to us for further evaluation and management of thrombocytopenia.  Most recent labs were reviewed and are as follows:    2024:  CBC: 6.4/15.6/44.5/122    On review of labs, it appears that patient has chronic mild thrombocytopenia going back 6 years, possibly longer.      His past medical history significant for coronary artery disease, status post PCI/stent placement, diabetes, on OHA's, hypertension and dyslipidemia.    He is currently on daily low-dose aspirin.  Denied any significant bruising or bleeding history.      He has not had a screening colonoscopy before.  Undergoing Cologuard screening with PCP.    Patient denied any history of alcohol use or smoking.  No recent infection/illness reported.          2024 patient presents for initial consultation today.  He reported having chronic persistent fatigue and chronic low backache for last 4-5 years.  Denied any recent weight/appetite changes.  No other symptoms reported.    25: Patient seen today for follow-up.  Reported having URI symptoms recently, denied any new complaints.  Has ongoing fatigue.      Subjective:  25: pt seen for follow up. He had acute MI in 2025 requiring PCI and has been started on DAPT. Denied any new symptoms now. He was restarted on oral b12 supplementation recently for B12 deficiency, not sure if he is taking it. Denied any bleeding/bruising etc.      Past Medical History:   Diagnosis Date    Allergies     eggs, corn, green beans- immunotherapy in childhood    Diabetes mellitus     Hyperlipidemia     Hypertension     CHASITY (obstructive sleep apnea)     Tobacco chew use     quit in     Wears  dentures     upper    Wears glasses        Past Surgical History:   Procedure Laterality Date    CARDIAC CATHETERIZATION  10/2018    CHD, 1 stent placement    CIRCUMCISION  08/1964    COLONOSCOPY  1985    HEEL SPUR SURGERY Left 2002    TONSILLECTOMY AND ADENOIDECTOMY  1981    WISDOM TOOTH EXTRACTION           Current Outpatient Medications:     albuterol sulfate  (90 Base) MCG/ACT inhaler, Inhale 2 puffs Every 4 (Four) Hours As Needed for Shortness of Air (cough)., Disp: 18 g, Rfl: 0    amLODIPine (NORVASC) 10 MG tablet, TAKE ONE TABLET BY MOUTH DAILY, Disp: 90 tablet, Rfl: 0    ASPIRIN 81 PO, Take 81 mg by mouth Daily., Disp: , Rfl:     betamethasone dipropionate (DIPROLENE) 0.05 % ointment, Apply 1 Application topically to the appropriate area as directed 2 (Two) Times a Day., Disp: , Rfl:     clopidogrel (PLAVIX) 75 MG tablet, Take 1 tablet by mouth Daily., Disp: 90 tablet, Rfl: 0    Cyanocobalamin 1000 MCG sublingual tablet, Place 1,000 mcg under the tongue Daily., Disp: 90 each, Rfl: 0    cyclobenzaprine (FLEXERIL) 5 MG tablet, Take 1 tablet by mouth 2 (Two) Times a Day As Needed for Muscle Spasms., Disp: 20 tablet, Rfl: 1    Entresto 24-26 MG tablet, TAKE ONE TABLET BY MOUTH TWICE DAILY, Disp: 60 tablet, Rfl: 0    fexofenadine (ALLEGRA) 180 MG tablet, Take 1 tablet by mouth As Needed., Disp: , Rfl:     furosemide (LASIX) 40 MG tablet, TAKE ONE TABLET BY MOUTH DAILY, Disp: 30 tablet, Rfl: 0    gabapentin (NEURONTIN) 400 MG capsule, TAKE ONE CAPSULE BY MOUTH TWICE DAILY, Disp: 180 capsule, Rfl: 0    Jardiance 10 MG tablet tablet, TAKE ONE TABLET BY MOUTH DAILY, Disp: 30 tablet, Rfl: 0    metFORMIN ER (GLUCOPHAGE-XR) 500 MG 24 hr tablet, Take 2 tablets by mouth 2 (Two) Times a Day With Meals., Disp: 360 tablet, Rfl: 0    metoprolol tartrate (LOPRESSOR) 25 MG tablet, TAKE ONE TABLET BY MOUTH TWICE DAILY, Disp: 60 tablet, Rfl: 0    montelukast (SINGULAIR) 10 MG tablet, TAKE ONE TABLET BY MOUTH IN THE EVENING  AS NEEDED, Disp: 90 tablet, Rfl: 3    niacin (NIASPAN) 500 MG CR tablet, Take 1 tablet by mouth Daily., Disp: , Rfl:     nitroglycerin (NITRODUR) 0.4 MG/HR patch, Place 1 patch on the skin as directed by provider Daily., Disp: 30 each, Rfl: 0    potassium chloride (KLOR-CON M20) 20 MEQ CR tablet, Take 1 tablet by mouth Daily., Disp: 30 tablet, Rfl: 0    rosuvastatin (CRESTOR) 40 MG tablet, TAKE ONE TABLET BY MOUTH DAILY, Disp: 90 tablet, Rfl: 0    spironolactone (ALDACTONE) 25 MG tablet, TAKE ONE TABLET BY MOUTH DAILY, Disp: 30 tablet, Rfl: 0    Allergies   Allergen Reactions    Ticagrelor Shortness Of Breath    Tenoretic [Atenolol-Chlorthalidone] Other (See Comments)     ED    Egg-Derived Products Other (See Comments)     Positive allergy test    Lisinopril Other (See Comments)     drowsy    Penicillins Hives and Itching       Family History   Problem Relation Age of Onset    Hypertension Mother     Hyperlipidemia Mother     Liver cancer Mother     Depression Father     Alcohol abuse Father     Hypertension Sister     Diabetes Sister     Hypertension Sister     Diabetes Sister     Liver cancer Sister     No Known Problems Daughter     Heart attack Son     No Known Problems Maternal Grandmother     Stroke Maternal Grandfather     Stroke Paternal Grandmother     No Known Problems Paternal Grandfather        Cancer-related family history includes Liver cancer in his mother and sister.      Social History     Tobacco Use    Smoking status: Never     Passive exposure: Never    Smokeless tobacco: Former     Types: Chew     Quit date: 1990   Vaping Use    Vaping status: Never Used   Substance Use Topics    Alcohol use: Not Currently    Drug use: Not Currently     Social History     Social History Narrative    Not on file       ROS:   Review of Systems   Constitutional:  Positive for fatigue.   HENT: Negative.     Eyes: Negative.    Respiratory: Negative.     Cardiovascular: Negative.    Gastrointestinal: Negative.     Endocrine: Negative.    Genitourinary: Negative.    Musculoskeletal:  Positive for back pain.   Skin: Negative.    Allergic/Immunologic: Negative.    Neurological: Negative.    Hematological: Negative.    Psychiatric/Behavioral: Negative.       Objective:    Vital Signs:  There were no vitals filed for this visit.      There is no height or weight on file to calculate BMI.    ECOG  (0) Fully active, able to carry on all predisease performance without restriction    Physical Exam:   Physical Exam  Constitutional:       Appearance: Normal appearance. He is normal weight.   HENT:      Head: Normocephalic and atraumatic.      Right Ear: External ear normal.      Left Ear: External ear normal.      Nose: Nose normal.      Mouth/Throat:      Mouth: Mucous membranes are moist.      Pharynx: Oropharynx is clear.   Eyes:      Extraocular Movements: Extraocular movements intact.      Conjunctiva/sclera: Conjunctivae normal.      Pupils: Pupils are equal, round, and reactive to light.   Cardiovascular:      Rate and Rhythm: Normal rate.      Pulses: Normal pulses.   Pulmonary:      Effort: Pulmonary effort is normal.   Abdominal:      General: Abdomen is flat.      Palpations: Abdomen is soft.   Musculoskeletal:         General: Normal range of motion.      Cervical back: Normal range of motion and neck supple.   Skin:     General: Skin is warm.   Neurological:      Mental Status: He is alert.   Psychiatric:         Mood and Affect: Mood normal.         Behavior: Behavior normal.         Thought Content: Thought content normal.         Judgment: Judgment normal.         Lab Results - Last 18 Months   Lab Units 05/01/25  1305 12/20/24  1318 09/06/24  1510   WBC 10*3/mm3 4.93 6.01 6.43   HEMOGLOBIN g/dL 14.4 15.9 15.6   HEMATOCRIT % 44.5 46.3 44.5   PLATELETS 10*3/mm3 130* 143 122*   MCV fL 93.7 92.4 89.4     Lab Results - Last 18 Months   Lab Units 04/16/25  1100 03/31/25  1117 03/21/25  0230 09/23/24  1145 09/06/24  1510  "06/12/24  1000   SODIUM mmol/L 141 141 140 138   < > 140   POTASSIUM mmol/L 3.5 3.8 3.2* 3.7   < > 3.4*   CHLORIDE mmol/L 102 102 101 99   < > 102   CO2 mmol/L 26.0 27.0 25.9 26.1   < > 26.6   BUN mg/dL 14 12 11 9   < > 12   CREATININE mg/dL 0.79 0.82 0.75* 0.75*   < > 0.73*   CALCIUM mg/dL 9.6 9.8 9.3 9.5   < > 9.1   BILIRUBIN mg/dL  --   --  0.8 0.6  --  0.5   ALK PHOS U/L  --   --  61 60  --  53   ALT (SGPT) U/L  --   --  26 36  --  34   AST (SGOT) U/L  --   --  28 31  --  29   GLUCOSE mg/dL 162* 163* 134* 172*   < > 141*    < > = values in this interval not displayed.       Lab Results   Component Value Date    GLUCOSE 162 (H) 04/16/2025    BUN 14 04/16/2025    CREATININE 0.79 04/16/2025    BCR 17.7 04/16/2025    K 3.5 04/16/2025    CO2 26.0 04/16/2025    CALCIUM 9.6 04/16/2025    ALBUMIN 4.6 03/21/2025    AST 28 03/21/2025    ALT 26 03/21/2025       No results for input(s): \"APTT\", \"INR\", \"PTT\" in the last 87415 hours.    Lab Results   Component Value Date    IRON 65 05/01/2025    TIBC 426 05/01/2025    FERRITIN 89.40 05/01/2025       Lab Results   Component Value Date    FOLATE 12.30 05/01/2025       No results found for: \"OCCULTBLD\"    Lab Results   Component Value Date    RETICCTPCT 2.31 (H) 05/01/2025     Lab Results   Component Value Date    [id number] 237 05/01/2025     No results found for: \"SPEP\", \"UPEP\"  No results found for: \"LDH\", \"URICACID\"  Lab Results   Component Value Date    MUKESH Negative 12/20/2024     Lab Results   Component Value Date    HAPTOGLOBIN 160 12/20/2024     Lab Results   Component Value Date    PTT 28.5 10/16/2018    INR 1.1 10/16/2018     No results found for: \"\"  No results found for: \"CEA\"  No components found for: \"CA-19-9\"  Lab Results   Component Value Date    PSA 0.423 06/12/2024     No results found for: \"SEDRATE\"       Assessment & Plan     Chronic mild thrombocytopenia: Resolved  -Unclear etiology.  Discussed with patient regarding various differential diagnosis for " thrombocytopenia.  -Ordered workup including nutritional panel, hemolysis labs, plasma cell disorder workup, hepatitis panel and HIV antibody panel.  This workup is reported negative  -Ultrasound abdomen showed hepatocellular disease with possible steatohepatitis.  Also noted mild splenomegaly which is a likely etiology of previously reported thrombocytopenia  6/5/25: plt count stable around 130K, likely sec to Fatty liver disease and splenomegaly. B12 deficiency may be contributing. Continue to monitor.    Chronic persistent fatigue:  -This is likely multifactorial.  Check nutritional panel, TSH and MUKESH.  Workup is negative.  Unclear etiology  Referral placed to sleep medicine for further evaluation.  B12 levels are low, Started on Oral B12 supplement  Ferritin levels WNL, TSAT is low, suggestive of Mild ANYA. Will recommend OTC oral iron supplement      Fatty liver disease/cirrhosis  Splenomegaly:  -Patient referred to gastroenterology for further evaluation and management    Acute MI: s/p PCI, on Dual antiplatelet therapy. Advised to monitor for bleeding.    6 month follow up with repeat labs, sooner as needed    Thank you very much for providing the opportunity to participate in this patient’s care. Please do not hesitate to call if there are any other questions.

## 2025-06-05 ENCOUNTER — OFFICE VISIT (OUTPATIENT)
Dept: ONCOLOGY | Facility: CLINIC | Age: 60
End: 2025-06-05
Payer: COMMERCIAL

## 2025-06-05 VITALS
WEIGHT: 200 LBS | OXYGEN SATURATION: 98 % | SYSTOLIC BLOOD PRESSURE: 150 MMHG | DIASTOLIC BLOOD PRESSURE: 83 MMHG | HEIGHT: 74 IN | HEART RATE: 54 BPM | BODY MASS INDEX: 25.67 KG/M2

## 2025-06-05 DIAGNOSIS — D50.9 IRON DEFICIENCY ANEMIA, UNSPECIFIED IRON DEFICIENCY ANEMIA TYPE: ICD-10-CM

## 2025-06-05 DIAGNOSIS — E53.8 B12 DEFICIENCY: ICD-10-CM

## 2025-06-05 DIAGNOSIS — E78.2 MIXED HYPERLIPIDEMIA: ICD-10-CM

## 2025-06-05 DIAGNOSIS — D69.6 THROMBOCYTOPENIA: Primary | ICD-10-CM

## 2025-06-05 DIAGNOSIS — R53.83 OTHER FATIGUE: ICD-10-CM

## 2025-06-05 RX ORDER — ROSUVASTATIN CALCIUM 40 MG/1
40 TABLET, COATED ORAL DAILY
Qty: 90 TABLET | Refills: 0 | Status: SHIPPED | OUTPATIENT
Start: 2025-06-05

## 2025-06-09 DIAGNOSIS — I25.2 HISTORY OF MI (MYOCARDIAL INFARCTION): ICD-10-CM

## 2025-06-09 DIAGNOSIS — E11.65 TYPE 2 DIABETES MELLITUS WITH HYPERGLYCEMIA, WITHOUT LONG-TERM CURRENT USE OF INSULIN: ICD-10-CM

## 2025-06-09 DIAGNOSIS — I10 BENIGN HYPERTENSION: ICD-10-CM

## 2025-06-09 DIAGNOSIS — E78.2 MIXED HYPERLIPIDEMIA: ICD-10-CM

## 2025-06-09 RX ORDER — SACUBITRIL AND VALSARTAN 24; 26 MG/1; MG/1
1 TABLET, FILM COATED ORAL 2 TIMES DAILY
Qty: 60 TABLET | Refills: 0 | Status: SHIPPED | OUTPATIENT
Start: 2025-06-09

## 2025-06-09 RX ORDER — SPIRONOLACTONE 25 MG/1
25 TABLET ORAL DAILY
Qty: 90 TABLET | Refills: 0 | Status: SHIPPED | OUTPATIENT
Start: 2025-06-09

## 2025-06-09 RX ORDER — METOPROLOL TARTRATE 25 MG/1
25 TABLET, FILM COATED ORAL 2 TIMES DAILY
Qty: 180 TABLET | Refills: 0 | Status: SHIPPED | OUTPATIENT
Start: 2025-06-09

## 2025-06-09 RX ORDER — FUROSEMIDE 40 MG/1
40 TABLET ORAL DAILY
Qty: 90 TABLET | Refills: 0 | Status: SHIPPED | OUTPATIENT
Start: 2025-06-09

## 2025-06-09 NOTE — TELEPHONE ENCOUNTER
Rx Refill Note  Requested Prescriptions     Pending Prescriptions Disp Refills    metoprolol tartrate (LOPRESSOR) 25 MG tablet [Pharmacy Med Name: metoprolol tartrate 25 mg tablet] 60 tablet 0     Sig: TAKE ONE TABLET BY MOUTH TWICE DAILY    Jardiance 10 MG tablet tablet [Pharmacy Med Name: Jardiance 10 mg tablet] 30 tablet 0     Sig: TAKE ONE TABLET BY MOUTH DAILY    furosemide (LASIX) 40 MG tablet [Pharmacy Med Name: furosemide 40 mg tablet] 30 tablet 0     Sig: TAKE ONE TABLET BY MOUTH DAILY    spironolactone (ALDACTONE) 25 MG tablet [Pharmacy Med Name: spironolactone 25 mg tablet] 30 tablet 0     Sig: TAKE ONE TABLET BY MOUTH DAILY    Entresto 24-26 MG tablet [Pharmacy Med Name: Entresto 24 mg-26 mg tablet] 60 tablet 0     Sig: TAKE ONE TABLET BY MOUTH TWICE DAILY      Last office visit with prescribing clinician: 3/21/2025   Last telemedicine visit with prescribing clinician: 12/19/2024   Next office visit with prescribing clinician: 6/19/2025                         Would you like a call back once the refill request has been completed: [] Yes [] No    If the office needs to give you a call back, can they leave a voicemail: [] Yes [] No    Hilda Clements MA  06/09/25, 09:21 EDT

## 2025-06-19 ENCOUNTER — OFFICE VISIT (OUTPATIENT)
Dept: FAMILY MEDICINE CLINIC | Facility: CLINIC | Age: 60
End: 2025-06-19
Payer: COMMERCIAL

## 2025-06-19 VITALS
SYSTOLIC BLOOD PRESSURE: 134 MMHG | BODY MASS INDEX: 25.41 KG/M2 | HEART RATE: 63 BPM | RESPIRATION RATE: 18 BRPM | OXYGEN SATURATION: 97 % | DIASTOLIC BLOOD PRESSURE: 84 MMHG | HEIGHT: 74 IN | TEMPERATURE: 97.8 F | WEIGHT: 198 LBS

## 2025-06-19 DIAGNOSIS — S39.012A STRAIN OF LUMBAR REGION, INITIAL ENCOUNTER: ICD-10-CM

## 2025-06-19 DIAGNOSIS — M54.50 CHRONIC BILATERAL LOW BACK PAIN WITHOUT SCIATICA: ICD-10-CM

## 2025-06-19 DIAGNOSIS — G89.29 CHRONIC BILATERAL LOW BACK PAIN WITHOUT SCIATICA: ICD-10-CM

## 2025-06-19 DIAGNOSIS — Z76.0 MEDICATION REFILL: ICD-10-CM

## 2025-06-19 DIAGNOSIS — D69.6 THROMBOCYTOPENIA: ICD-10-CM

## 2025-06-19 DIAGNOSIS — Z12.5 SCREENING PSA (PROSTATE SPECIFIC ANTIGEN): ICD-10-CM

## 2025-06-19 DIAGNOSIS — E78.2 MIXED HYPERLIPIDEMIA: ICD-10-CM

## 2025-06-19 DIAGNOSIS — E11.65 TYPE 2 DIABETES MELLITUS WITH HYPERGLYCEMIA, WITHOUT LONG-TERM CURRENT USE OF INSULIN: ICD-10-CM

## 2025-06-19 DIAGNOSIS — Z13.31 SCREENING FOR DEPRESSION: ICD-10-CM

## 2025-06-19 DIAGNOSIS — Z00.00 WELL ADULT EXAM: Primary | ICD-10-CM

## 2025-06-19 RX ORDER — MELOXICAM 7.5 MG/1
7.5 TABLET ORAL DAILY
Qty: 10 TABLET | Refills: 0 | Status: SHIPPED | OUTPATIENT
Start: 2025-06-19

## 2025-06-19 RX ORDER — CYCLOBENZAPRINE HCL 5 MG
5 TABLET ORAL 2 TIMES DAILY PRN
Qty: 20 TABLET | Refills: 1 | Status: SHIPPED | OUTPATIENT
Start: 2025-06-19

## 2025-06-19 NOTE — PROGRESS NOTES
Gustavo Singh  [id number]  1965  male     06/19/2025      Chief Complaint  Annual Exam (Would like 90 day scripts/) and Back Pain (Still having pain)    History of Present Illness  59-year-old male patient presents today for annual well exam.  Screened for depression, denies feeling down.  States he is not fasting today.  Agrees to come back in for fasting labs next week.  Due for screening PSA.  Patient complaining of low back pain x 1 to 2 weeks.  States low back pain started after he lifted his [place] weed killer at home causing him to strain his lower back.  States he has some leftover Flexeril muscle relaxer meloxicam which helped.  Requesting chiropractor referral to Dr. Naresh Hernandez at [facility].  Denies fever, chills, body aches, headache, lightheadedness, dizziness, numbness, tingling, cough, shortness of breath, chest pain, palpitations, leg swelling, abdominal pain, NVD, dysuria, rash.  Back Pain      BMI is >= 25 and <30. (Overweight) The following options were offered after discussion;: exercise counseling/recommendations and nutrition counseling/recommendations       Review of Systems   Constitutional: Negative.    HENT: Negative.     Eyes: Negative.    Respiratory: Negative.     Cardiovascular: Negative.    Gastrointestinal: Negative.    Endocrine: Negative.    Genitourinary: Negative.    Musculoskeletal:  Positive for back pain (low). Negative for neck pain.   Skin: Negative.    Allergic/Immunologic: Negative.    Neurological: Negative.    Hematological: Negative.    Psychiatric/Behavioral: Negative.         Past Medical History:   Diagnosis Date    Allergies     eggs, corn, green beans- immunotherapy in childhood    CHF (congestive heart failure) 02/25    Diabetes mellitus     Heart attack 02/06/2025    Hyperlipidemia     Hypertension     Injury of back 2020    This is 3rd time for severe back pain    CHASITY (obstructive sleep apnea)     Tobacco chew use     quit in 1985    Wears  "dentures     upper    Wears glasses        Past Surgical History:   Procedure Laterality Date    CARDIAC CATHETERIZATION  10/2018    CHD, 1 stent placement    CIRCUMCISION  08/1964    COLONOSCOPY  1985    HEEL SPUR SURGERY Left 2002    TONSILLECTOMY AND ADENOIDECTOMY  1981    WISDOM TOOTH EXTRACTION         Family History   Problem Relation Age of Onset    Hypertension Mother     Hyperlipidemia Mother     Liver cancer Mother     Cancer Mother         Passed  2016    Depression Father     Alcohol abuse Father         Quit 1986    Hypertension Sister     Diabetes Sister     Hypertension Sister     Diabetes Sister         Passed 2023    Liver cancer Sister     Cancer Sister         Passed 2023    No Known Problems Daughter     Heart attack Son     No Known Problems Maternal Grandmother     Stroke Maternal Grandfather     Stroke Paternal Grandmother     No Known Problems Paternal Grandfather        Social History     Socioeconomic History    Marital status:    Tobacco Use    Smoking status: Never     Passive exposure: Never    Smokeless tobacco: Former     Types: Chew     Quit date: 1990    Tobacco comments:     Smokeless Tobacco   Vaping Use    Vaping status: Never Used   Substance and Sexual Activity    Alcohol use: Never    Drug use: Never    Sexual activity: Defer        Allergies   Allergen Reactions    Ticagrelor Shortness Of Breath    Tenoretic [Atenolol-Chlorthalidone] Other (See Comments)     ED    Egg-Derived Products Other (See Comments)     Positive allergy test    Lisinopril Other (See Comments)     drowsy    Penicillins Hives and Itching         Objective   Vital Signs:   /84 (BP Location: Right arm, Patient Position: Sitting, Cuff Size: Adult)   Pulse 63   Temp 97.8 °F (36.6 °C) (Temporal)   Resp 18   Ht 188 cm (74\")   Wt 89.8 kg (198 lb)   SpO2 97%   BMI 25.42 kg/m²       Physical Exam  Vitals and nursing note reviewed.   Constitutional:       General: He is not in acute distress.     " Appearance: Normal appearance. He is not ill-appearing, toxic-appearing or diaphoretic.   HENT:      Head: Normocephalic and atraumatic.      Jaw: There is normal jaw occlusion.      Right Ear: Hearing, tympanic membrane, ear canal and external ear normal.      Left Ear: Hearing, tympanic membrane, ear canal and external ear normal.      Nose: Nose normal.      Mouth/Throat:      Lips: Pink.      Pharynx: Oropharynx is clear. Uvula midline.   Eyes:      General: Lids are normal. Vision grossly intact. Gaze aligned appropriately.      Extraocular Movements: Extraocular movements intact.      Conjunctiva/sclera: Conjunctivae normal.      Pupils: Pupils are equal, round, and reactive to light.   Cardiovascular:      Rate and Rhythm: Normal rate and regular rhythm.      Pulses: Normal pulses.           Carotid pulses are 2+ on the right side and 2+ on the left side.       Radial pulses are 2+ on the right side and 2+ on the left side.        Dorsalis pedis pulses are 2+ on the right side and 2+ on the left side.        Posterior tibial pulses are 2+ on the right side and 2+ on the left side.      Heart sounds: Normal heart sounds, S1 normal and S2 normal. No murmur heard.  Pulmonary:      Effort: Pulmonary effort is normal.      Breath sounds: Normal breath sounds and air entry.   Abdominal:      General: Abdomen is flat. Bowel sounds are normal. There is no distension or abdominal bruit.      Palpations: Abdomen is soft.      Tenderness: There is no abdominal tenderness.   Musculoskeletal:         General: Normal range of motion.      Cervical back: Full passive range of motion without pain, normal range of motion and neck supple.      Lumbar back: Tenderness present. No spasms or bony tenderness. Normal range of motion. Negative right straight leg raise test and negative left straight leg raise test.      Right lower leg: No edema.      Left lower leg: No edema.   Skin:     General: Skin is warm and dry.       Capillary Refill: Capillary refill takes less than 2 seconds.      Coloration: Skin is not pale.      Findings: No bruising, erythema or rash.   Neurological:      General: No focal deficit present.      Mental Status: He is alert and oriented to person, place, and time. Mental status is at baseline.      GCS: GCS eye subscore is 4. GCS verbal subscore is 5. GCS motor subscore is 6.      Cranial Nerves: Cranial nerves 2-12 are intact. No cranial nerve deficit.      Sensory: Sensation is intact. No sensory deficit.      Motor: Motor function is intact. No weakness.      Coordination: Coordination is intact. Coordination normal.      Gait: Gait is intact. Gait normal.      Deep Tendon Reflexes: Reflexes normal.   Psychiatric:         Attention and Perception: Attention and perception normal.         Mood and Affect: Mood and affect normal.         Speech: Speech normal.         Behavior: Behavior normal. Behavior is cooperative.         Thought Content: Thought content normal.         Cognition and Memory: Cognition and memory normal.         Judgment: Judgment normal.                 Assessment and Plan   Diagnoses and all orders for this visit:    1. Well adult exam (Primary)  -     PSA Screen; Future  -     Comprehensive metabolic panel; Future  -     Hemoglobin A1c; Future  -     TSH Rfx On Abnormal To Free T4; Future  -     Lipid panel; Future    2. Strain of lumbar region, initial encounter  -     Ambulatory Referral to Chiropractic  -     cyclobenzaprine (FLEXERIL) 5 MG tablet; Take 1 tablet by mouth 2 (Two) Times a Day As Needed for Muscle Spasms.  Dispense: 20 tablet; Refill: 1  -     meloxicam (Mobic) 7.5 MG tablet; Take 1 tablet by mouth Daily.  Dispense: 10 tablet; Refill: 0    3. Screening PSA (prostate specific antigen)  -     PSA Screen; Future    4. Medication refill  -     cyclobenzaprine (FLEXERIL) 5 MG tablet; Take 1 tablet by mouth 2 (Two) Times a Day As Needed for Muscle Spasms.  Dispense: 20  tablet; Refill: 1    5. Chronic bilateral low back pain without sciatica  -     cyclobenzaprine (FLEXERIL) 5 MG tablet; Take 1 tablet by mouth 2 (Two) Times a Day As Needed for Muscle Spasms.  Dispense: 20 tablet; Refill: 1  -     meloxicam (Mobic) 7.5 MG tablet; Take 1 tablet by mouth Daily.  Dispense: 10 tablet; Refill: 0    6. Screening for depression [Z13.31]    7. Type 2 diabetes mellitus with hyperglycemia, without long-term current use of insulin  -     Comprehensive metabolic panel; Future  -     Hemoglobin A1c; Future  -     TSH Rfx On Abnormal To Free T4; Future  -     Lipid panel; Future    8. Mixed hyperlipidemia  -     Comprehensive metabolic panel; Future  -     Hemoglobin A1c; Future  -     TSH Rfx On Abnormal To Free T4; Future  -     Lipid panel; Future    9. Thrombocytopenia    Well adult exam  -Screened for depression, denies feeling down.    -Last eye exam 01/2025 at [facility].     -Discussed and recommended age-appropriate immunizations.    -Discussed healthy eating habits and incorporating routine daily exercise.    -Discussed and recommended routine dental and eye exams.  -Brush teeth twice daily and dental floss daily. Has upper dentures.     Strain of lumbar region  - Occurred approximately 1 to 2 weeks ago after patient states he was lifting his [place] weed killer.  - Treating with meloxicam and Flexeril muscle laxer.  Instructed to not take over-the-counter NSAIDs such as ibuprofen/Aleve while taking meloxicam.  Instructed to not drive while taking muscle relaxer.  - Over-the-counter Tylenol as needed.  - Ice affected area 2-3 times a day for 20 minutes at a time.  - [facility] with [facility]. Naresh Hernandez per patient request.  - On gabapentin.  - Discussed [employer] controlled substance policy and reiterated patient obligations.  Controlled substance agreement updated and signed today.      Thrombocytopenia  - Sees [facility].    -Discussed ER red flags.  -  Instructed patient to follow-up with me in 3 months for diabetes and fasting labs.    Follow Up   Return in about 3 months (around 9/19/2025) for Recheck.    There are no Patient Instructions on file for this visit.

## 2025-06-27 ENCOUNTER — CLINICAL SUPPORT (OUTPATIENT)
Dept: FAMILY MEDICINE CLINIC | Facility: CLINIC | Age: 60
End: 2025-06-27
Payer: COMMERCIAL

## 2025-06-27 DIAGNOSIS — E78.2 MIXED HYPERLIPIDEMIA: ICD-10-CM

## 2025-06-27 DIAGNOSIS — E11.65 TYPE 2 DIABETES MELLITUS WITH HYPERGLYCEMIA, WITHOUT LONG-TERM CURRENT USE OF INSULIN: ICD-10-CM

## 2025-06-27 DIAGNOSIS — Z12.5 SCREENING PSA (PROSTATE SPECIFIC ANTIGEN): ICD-10-CM

## 2025-06-27 DIAGNOSIS — Z00.00 WELL ADULT EXAM: ICD-10-CM

## 2025-06-27 LAB — HBA1C MFR BLD: 7.4 % (ref 4.8–5.6)

## 2025-06-27 PROCEDURE — G0103 PSA SCREENING: HCPCS | Performed by: NURSE PRACTITIONER

## 2025-06-27 PROCEDURE — 80061 LIPID PANEL: CPT | Performed by: NURSE PRACTITIONER

## 2025-06-27 PROCEDURE — 83036 HEMOGLOBIN GLYCOSYLATED A1C: CPT | Performed by: NURSE PRACTITIONER

## 2025-06-27 PROCEDURE — 84443 ASSAY THYROID STIM HORMONE: CPT | Performed by: NURSE PRACTITIONER

## 2025-06-27 PROCEDURE — 80053 COMPREHEN METABOLIC PANEL: CPT | Performed by: NURSE PRACTITIONER

## 2025-06-27 NOTE — PROGRESS NOTES
Venipuncture Blood Specimen Collection  Venipuncture performed in RT ARM by Noemi Lee with good hemostasis. Patient tolerated the procedure well without complications.   06/27/25   Noemi Lee

## 2025-06-28 LAB
ALBUMIN SERPL-MCNC: 4.6 G/DL (ref 3.5–5.2)
ALBUMIN/GLOB SERPL: 2.1 G/DL
ALP SERPL-CCNC: 58 U/L (ref 39–117)
ALT SERPL W P-5'-P-CCNC: 27 U/L (ref 1–41)
ANION GAP SERPL CALCULATED.3IONS-SCNC: 13.4 MMOL/L (ref 5–15)
AST SERPL-CCNC: 24 U/L (ref 1–40)
BILIRUB SERPL-MCNC: 0.5 MG/DL (ref 0–1.2)
BUN SERPL-MCNC: 13 MG/DL (ref 8–23)
BUN/CREAT SERPL: 17.6 (ref 7–25)
CALCIUM SPEC-SCNC: 9.4 MG/DL (ref 8.6–10.5)
CHLORIDE SERPL-SCNC: 104 MMOL/L (ref 98–107)
CHOLEST SERPL-MCNC: 113 MG/DL (ref 0–200)
CO2 SERPL-SCNC: 22.6 MMOL/L (ref 22–29)
CREAT SERPL-MCNC: 0.74 MG/DL (ref 0.76–1.27)
EGFRCR SERPLBLD CKD-EPI 2021: 103.7 ML/MIN/1.73
GLOBULIN UR ELPH-MCNC: 2.2 GM/DL
GLUCOSE SERPL-MCNC: 160 MG/DL (ref 65–99)
HDLC SERPL-MCNC: 39 MG/DL (ref 40–60)
LDLC SERPL CALC-MCNC: 49 MG/DL (ref 0–100)
LDLC/HDLC SERPL: 1.15 {RATIO}
POTASSIUM SERPL-SCNC: 3.4 MMOL/L (ref 3.5–5.2)
PROT SERPL-MCNC: 6.8 G/DL (ref 6–8.5)
PSA SERPL-MCNC: 0.4 NG/ML (ref 0–4)
SODIUM SERPL-SCNC: 140 MMOL/L (ref 136–145)
TRIGL SERPL-MCNC: 146 MG/DL (ref 0–150)
TSH SERPL DL<=0.05 MIU/L-ACNC: 1.26 UIU/ML (ref 0.27–4.2)
VLDLC SERPL-MCNC: 25 MG/DL (ref 5–40)

## 2025-07-09 DIAGNOSIS — E78.2 MIXED HYPERLIPIDEMIA: ICD-10-CM

## 2025-07-09 DIAGNOSIS — I25.2 HISTORY OF MI (MYOCARDIAL INFARCTION): ICD-10-CM

## 2025-07-09 DIAGNOSIS — E11.65 TYPE 2 DIABETES MELLITUS WITH HYPERGLYCEMIA, WITHOUT LONG-TERM CURRENT USE OF INSULIN: ICD-10-CM

## 2025-07-09 DIAGNOSIS — I10 BENIGN HYPERTENSION: ICD-10-CM

## 2025-07-09 DIAGNOSIS — E87.6 HYPOKALEMIA: ICD-10-CM

## 2025-07-10 RX ORDER — SACUBITRIL AND VALSARTAN 24; 26 MG/1; MG/1
1 TABLET, FILM COATED ORAL 2 TIMES DAILY
Qty: 60 TABLET | Refills: 0 | Status: SHIPPED | OUTPATIENT
Start: 2025-07-10

## 2025-07-10 RX ORDER — FUROSEMIDE 40 MG/1
40 TABLET ORAL DAILY
Qty: 90 TABLET | Refills: 0 | Status: SHIPPED | OUTPATIENT
Start: 2025-07-10

## 2025-07-10 RX ORDER — METOPROLOL TARTRATE 25 MG/1
25 TABLET, FILM COATED ORAL 2 TIMES DAILY
Qty: 180 TABLET | Refills: 0 | Status: SHIPPED | OUTPATIENT
Start: 2025-07-10

## 2025-07-10 RX ORDER — EMPAGLIFLOZIN 10 MG/1
10 TABLET, FILM COATED ORAL DAILY
Qty: 90 TABLET | Refills: 0 | Status: SHIPPED | OUTPATIENT
Start: 2025-07-10

## 2025-07-10 RX ORDER — POTASSIUM CHLORIDE 1500 MG/1
20 TABLET, EXTENDED RELEASE ORAL 2 TIMES DAILY
Qty: 30 TABLET | Refills: 0 | Status: SHIPPED | OUTPATIENT
Start: 2025-07-10

## 2025-07-10 RX ORDER — METFORMIN HYDROCHLORIDE 500 MG/1
1000 TABLET, EXTENDED RELEASE ORAL 2 TIMES DAILY WITH MEALS
Qty: 360 TABLET | Refills: 0 | Status: SHIPPED | OUTPATIENT
Start: 2025-07-10

## 2025-07-10 RX ORDER — SPIRONOLACTONE 25 MG/1
25 TABLET ORAL DAILY
Qty: 90 TABLET | Refills: 0 | Status: SHIPPED | OUTPATIENT
Start: 2025-07-10

## 2025-07-10 RX ORDER — CLOPIDOGREL BISULFATE 75 MG/1
75 TABLET ORAL DAILY
Qty: 90 TABLET | Refills: 0 | Status: SHIPPED | OUTPATIENT
Start: 2025-07-10

## 2025-08-04 ENCOUNTER — CLINICAL SUPPORT (OUTPATIENT)
Dept: FAMILY MEDICINE CLINIC | Facility: CLINIC | Age: 60
End: 2025-08-04
Payer: COMMERCIAL

## 2025-08-04 DIAGNOSIS — E87.6 HYPOKALEMIA: ICD-10-CM

## 2025-08-04 DIAGNOSIS — E11.65 TYPE 2 DIABETES MELLITUS WITH HYPERGLYCEMIA, WITHOUT LONG-TERM CURRENT USE OF INSULIN: ICD-10-CM

## 2025-08-04 DIAGNOSIS — J30.1 ALLERGIC RHINITIS DUE TO POLLEN: ICD-10-CM

## 2025-08-04 PROCEDURE — 80048 BASIC METABOLIC PNL TOTAL CA: CPT | Performed by: NURSE PRACTITIONER

## 2025-08-04 RX ORDER — MONTELUKAST SODIUM 10 MG/1
TABLET ORAL
Qty: 90 TABLET | Refills: 0 | Status: SHIPPED | OUTPATIENT
Start: 2025-08-04

## 2025-08-05 LAB
ANION GAP SERPL CALCULATED.3IONS-SCNC: 13.1 MMOL/L (ref 5–15)
BUN SERPL-MCNC: 10 MG/DL (ref 8–23)
BUN/CREAT SERPL: 13.7 (ref 7–25)
CALCIUM SPEC-SCNC: 9 MG/DL (ref 8.6–10.5)
CHLORIDE SERPL-SCNC: 101 MMOL/L (ref 98–107)
CO2 SERPL-SCNC: 25.9 MMOL/L (ref 22–29)
CREAT SERPL-MCNC: 0.73 MG/DL (ref 0.76–1.27)
EGFRCR SERPLBLD CKD-EPI 2021: 104.2 ML/MIN/1.73
GLUCOSE SERPL-MCNC: 164 MG/DL (ref 65–99)
POTASSIUM SERPL-SCNC: 3.7 MMOL/L (ref 3.5–5.2)
SODIUM SERPL-SCNC: 140 MMOL/L (ref 136–145)

## 2025-08-07 DIAGNOSIS — E87.6 HYPOKALEMIA: ICD-10-CM

## 2025-08-07 DIAGNOSIS — E11.65 TYPE 2 DIABETES MELLITUS WITH HYPERGLYCEMIA, WITHOUT LONG-TERM CURRENT USE OF INSULIN: ICD-10-CM

## 2025-08-07 DIAGNOSIS — I10 BENIGN HYPERTENSION: ICD-10-CM

## 2025-08-07 DIAGNOSIS — E78.2 MIXED HYPERLIPIDEMIA: ICD-10-CM

## 2025-08-07 DIAGNOSIS — I25.2 HISTORY OF MI (MYOCARDIAL INFARCTION): ICD-10-CM

## 2025-08-07 RX ORDER — SACUBITRIL AND VALSARTAN 24; 26 MG/1; MG/1
1 TABLET, FILM COATED ORAL 2 TIMES DAILY
Qty: 60 TABLET | Refills: 0 | Status: SHIPPED | OUTPATIENT
Start: 2025-08-07

## 2025-08-07 RX ORDER — POTASSIUM CHLORIDE 1500 MG/1
20 TABLET, EXTENDED RELEASE ORAL 2 TIMES DAILY
Qty: 30 TABLET | Refills: 0 | Status: SHIPPED | OUTPATIENT
Start: 2025-08-07

## 2025-08-20 DIAGNOSIS — E87.6 HYPOKALEMIA: ICD-10-CM

## 2025-08-20 DIAGNOSIS — E08.42 DIABETIC POLYNEUROPATHY ASSOCIATED WITH DIABETES MELLITUS DUE TO UNDERLYING CONDITION: ICD-10-CM

## 2025-08-20 RX ORDER — POTASSIUM CHLORIDE 1500 MG/1
20 TABLET, EXTENDED RELEASE ORAL 2 TIMES DAILY
Qty: 30 TABLET | Refills: 0 | Status: SHIPPED | OUTPATIENT
Start: 2025-08-20

## 2025-08-20 RX ORDER — GABAPENTIN 400 MG/1
400 CAPSULE ORAL EVERY 12 HOURS SCHEDULED
Qty: 180 CAPSULE | Refills: 0 | Status: SHIPPED | OUTPATIENT
Start: 2025-08-20

## 2025-08-27 ENCOUNTER — TELEPHONE (OUTPATIENT)
Dept: FAMILY MEDICINE CLINIC | Facility: CLINIC | Age: 60
End: 2025-08-27
Payer: COMMERCIAL

## 2025-08-27 DIAGNOSIS — E87.6 HYPOKALEMIA: ICD-10-CM

## 2025-08-28 RX ORDER — POTASSIUM CHLORIDE 1500 MG/1
20 TABLET, EXTENDED RELEASE ORAL 2 TIMES DAILY
Qty: 60 TABLET | Refills: 1 | Status: SHIPPED | OUTPATIENT
Start: 2025-08-28